# Patient Record
Sex: FEMALE | Race: WHITE | NOT HISPANIC OR LATINO | URBAN - METROPOLITAN AREA
[De-identification: names, ages, dates, MRNs, and addresses within clinical notes are randomized per-mention and may not be internally consistent; named-entity substitution may affect disease eponyms.]

---

## 2017-02-08 ENCOUNTER — APPOINTMENT (RX ONLY)
Dept: URBAN - METROPOLITAN AREA CLINIC 349 | Facility: CLINIC | Age: 72
Setting detail: DERMATOLOGY
End: 2017-02-08

## 2017-02-08 DIAGNOSIS — L82.0 INFLAMED SEBORRHEIC KERATOSIS: ICD-10-CM

## 2017-02-08 PROCEDURE — ? COUNSELING

## 2017-02-08 PROCEDURE — 17110 DESTRUCTION B9 LES UP TO 14: CPT

## 2017-02-08 PROCEDURE — ? LIQUID NITROGEN

## 2017-02-08 ASSESSMENT — LOCATION ZONE DERM: LOCATION ZONE: FACE

## 2017-02-08 ASSESSMENT — LOCATION DETAILED DESCRIPTION DERM: LOCATION DETAILED: RIGHT INFERIOR CENTRAL MALAR CHEEK

## 2017-02-08 ASSESSMENT — LOCATION SIMPLE DESCRIPTION DERM: LOCATION SIMPLE: RIGHT CHEEK

## 2017-02-08 NOTE — PROCEDURE: LIQUID NITROGEN
Consent: The patient's consent was obtained including but not limited to risks of crusting, scabbing, blistering, scarring, darker or lighter pigmentary change, recurrence, incomplete removal and infection.
Duration Of Freeze Thaw-Cycle (Seconds): 3
Include Z78.9 (Other Specified Conditions Influencing Health Status) As An Associated Diagnosis?: Yes
Medical Necessity Information: It is in your best interest to select a reason for this procedure from the list below. All of these items fulfill various CMS LCD requirements except the new and changing color options.
Detail Level: Detailed
Render Post-Care Instructions In Note?: no
Medical Necessity Clause: This procedure was medically necessary because the lesions that were treated were:
Number Of Freeze-Thaw Cycles: 1 freeze-thaw cycle
Post-Care Instructions: I reviewed with the patient in detail post-care instructions. Patient is to wear sunprotection, and avoid picking at any of the treated lesions. Pt may apply Vaseline to crusted or scabbing areas.

## 2017-08-14 ENCOUNTER — APPOINTMENT (RX ONLY)
Dept: URBAN - METROPOLITAN AREA CLINIC 349 | Facility: CLINIC | Age: 72
Setting detail: DERMATOLOGY
End: 2017-08-14

## 2017-08-14 DIAGNOSIS — L82.1 OTHER SEBORRHEIC KERATOSIS: ICD-10-CM

## 2017-08-14 DIAGNOSIS — D22 MELANOCYTIC NEVI: ICD-10-CM

## 2017-08-14 PROBLEM — D22.0 MELANOCYTIC NEVI OF LIP: Status: ACTIVE | Noted: 2017-08-14

## 2017-08-14 PROCEDURE — 88305 TISSUE EXAM BY PATHOLOGIST: CPT

## 2017-08-14 PROCEDURE — 11310 SHAVE SKIN LESION 0.5 CM/<: CPT

## 2017-08-14 PROCEDURE — ? COUNSELING

## 2017-08-14 PROCEDURE — 99213 OFFICE O/P EST LOW 20 MIN: CPT | Mod: 25

## 2017-08-14 PROCEDURE — ? PATHOLOGY BILLING

## 2017-08-14 PROCEDURE — A4550 SURGICAL TRAYS: HCPCS

## 2017-08-14 PROCEDURE — ? SHAVE REMOVAL

## 2017-08-14 ASSESSMENT — LOCATION DETAILED DESCRIPTION DERM
LOCATION DETAILED: LEFT INFERIOR UPPER BACK
LOCATION DETAILED: PERIUMBILICAL SKIN
LOCATION DETAILED: LEFT LATERAL SUPERIOR CHEST
LOCATION DETAILED: RIGHT LATERAL SUPERIOR CHEST
LOCATION DETAILED: RIGHT SUPERIOR VERMILION LIP
LOCATION DETAILED: LEFT SUPERIOR VERMILION LIP
LOCATION DETAILED: RIGHT SUPERIOR UPPER BACK
LOCATION DETAILED: RIGHT SUPERIOR LATERAL MIDBACK
LOCATION DETAILED: LEFT LATERAL UPPER BACK

## 2017-08-14 ASSESSMENT — LOCATION ZONE DERM
LOCATION ZONE: LIP
LOCATION ZONE: TRUNK
LOCATION ZONE: LIP

## 2017-08-14 ASSESSMENT — LOCATION SIMPLE DESCRIPTION DERM
LOCATION SIMPLE: LEFT LIP
LOCATION SIMPLE: ABDOMEN
LOCATION SIMPLE: CHEST
LOCATION SIMPLE: LEFT UPPER BACK
LOCATION SIMPLE: RIGHT UPPER BACK
LOCATION SIMPLE: RIGHT LOWER BACK
LOCATION SIMPLE: RIGHT LIP

## 2017-08-14 NOTE — PROCEDURE: SHAVE REMOVAL
Size Of Lesion In Cm (Required): 0.3
Notification Instructions: Patient will be notified of biopsy results. However, patient instructed to call the office if not contacted within 2 weeks.
Bill For Surgical Tray: yes
X Size Of Lesion In Cm (Optional): 0
Wound Care: Vaseline
Medical Necessity Information: It is in your best interest to select a reason for this procedure from the list below. All of these items fulfill various CMS LCD requirements except the new and changing color options.
Hemostasis: Electrocautery
Anesthesia Volume In Cc: 0.5
Size Of Margin In Cm (Margins Are Not Added To Billing Dimensions): -
Biopsy Method: Dermablade
Bill 38852 For Specimen Handling/Conveyance To Laboratory?: no
Anesthesia Type: 2% lidocaine with epinephrine
Path Notes (To The Dermatopathologist): Please check margins.
Consent was obtained from the patient. The risks and benefits to therapy were discussed in detail. Specifically, the risks of infection, scarring, bleeding, prolonged wound healing, incomplete removal, allergy to anesthesia, nerve injury and recurrence were addressed. Prior to the procedure, the treatment site was clearly identified and confirmed by the patient. All components of Universal Protocol/PAUSE Rule completed.
Medical Necessity Clause: This procedure was medically necessary because the lesion that was treated was: growing, patient has had a renal transplant
Accession #: [id number]
Post-Care Instructions: I reviewed with the patient in detail post-care instructions. Patient is to keep the biopsy site dry overnight, and then apply vaseline twice daily until healed. Patient may apply hydrogen peroxide soaks to remove any crusting. After the procedure, the patient was observed for 5-10 minutes and was oriented to person, place and time and demied feeling dizzy, queasy, and stated that they did not feel that they were going to faint.
Detail Level: Detailed
Billing Type: Third-Party Bill

## 2017-08-14 NOTE — HPI: SKIN LESION
How Severe Is Your Skin Lesion?: moderate
Has Your Skin Lesion Been Treated?: been treated
Is This A New Presentation, Or A Follow-Up?: Skin Lesion
Which Family Member (Optional)?: Brother

## 2017-08-28 ENCOUNTER — APPOINTMENT (RX ONLY)
Dept: URBAN - METROPOLITAN AREA CLINIC 349 | Facility: CLINIC | Age: 72
Setting detail: DERMATOLOGY
End: 2017-08-28

## 2017-08-28 DIAGNOSIS — Z41.9 ENCOUNTER FOR PROCEDURE FOR PURPOSES OTHER THAN REMEDYING HEALTH STATE, UNSPECIFIED: ICD-10-CM

## 2017-08-28 PROCEDURE — ? FILLERS

## 2018-01-04 ENCOUNTER — APPOINTMENT (RX ONLY)
Dept: URBAN - METROPOLITAN AREA CLINIC 349 | Facility: CLINIC | Age: 73
Setting detail: DERMATOLOGY
End: 2018-01-04

## 2018-01-04 DIAGNOSIS — L82.1 OTHER SEBORRHEIC KERATOSIS: ICD-10-CM

## 2018-01-04 DIAGNOSIS — L82.0 INFLAMED SEBORRHEIC KERATOSIS: ICD-10-CM

## 2018-01-04 PROBLEM — K21.9 GASTRO-ESOPHAGEAL REFLUX DISEASE WITHOUT ESOPHAGITIS: Status: ACTIVE | Noted: 2018-01-04

## 2018-01-04 PROCEDURE — ? LIQUID NITROGEN

## 2018-01-04 PROCEDURE — ? COUNSELING

## 2018-01-04 PROCEDURE — 17110 DESTRUCTION B9 LES UP TO 14: CPT

## 2018-01-04 PROCEDURE — 99213 OFFICE O/P EST LOW 20 MIN: CPT | Mod: 25

## 2018-01-04 PROCEDURE — ? PHOTO-DOCUMENTATION

## 2018-01-04 ASSESSMENT — LOCATION SIMPLE DESCRIPTION DERM
LOCATION SIMPLE: CHEST
LOCATION SIMPLE: RIGHT SHOULDER
LOCATION SIMPLE: LEFT UPPER BACK
LOCATION SIMPLE: RIGHT UPPER BACK

## 2018-01-04 ASSESSMENT — LOCATION DETAILED DESCRIPTION DERM
LOCATION DETAILED: LEFT SUPERIOR UPPER BACK
LOCATION DETAILED: RIGHT LATERAL UPPER BACK
LOCATION DETAILED: RIGHT LATERAL SUPERIOR CHEST
LOCATION DETAILED: RIGHT POSTERIOR SHOULDER

## 2018-01-04 ASSESSMENT — LOCATION ZONE DERM
LOCATION ZONE: ARM
LOCATION ZONE: TRUNK

## 2018-01-04 NOTE — PROCEDURE: LIQUID NITROGEN
Medical Necessity Clause: This procedure was medically necessary because the lesions that were treated were:
Consent: The patient's consent was obtained including but not limited to risks of crusting, scabbing, blistering, scarring, darker or lighter pigmentary change, recurrence, incomplete removal and infection.
Duration Of Freeze Thaw-Cycle (Seconds): 5-10
Detail Level: Detailed
Add 52 Modifier (Optional): no
Include Z78.9 (Other Specified Conditions Influencing Health Status) As An Associated Diagnosis?: Yes
Post-Care Instructions: I reviewed with the patient in detail post-care instructions. Patient is to wear sunprotection, and avoid picking at any of the treated lesions. Pt may apply Vaseline to crusted or scabbing areas.
Medical Necessity Information: It is in your best interest to select a reason for this procedure from the list below. All of these items fulfill various CMS LCD requirements except the new and changing color options.

## 2018-08-13 ENCOUNTER — APPOINTMENT (RX ONLY)
Dept: URBAN - METROPOLITAN AREA CLINIC 349 | Facility: CLINIC | Age: 73
Setting detail: DERMATOLOGY
End: 2018-08-13

## 2018-08-13 DIAGNOSIS — Z71.89 OTHER SPECIFIED COUNSELING: ICD-10-CM

## 2018-08-13 DIAGNOSIS — L73.8 OTHER SPECIFIED FOLLICULAR DISORDERS: ICD-10-CM

## 2018-08-13 DIAGNOSIS — Z80.8 FAMILY HISTORY OF MALIGNANT NEOPLASM OF OTHER ORGANS OR SYSTEMS: ICD-10-CM

## 2018-08-13 DIAGNOSIS — D22 MELANOCYTIC NEVI: ICD-10-CM

## 2018-08-13 DIAGNOSIS — L82.1 OTHER SEBORRHEIC KERATOSIS: ICD-10-CM

## 2018-08-13 PROBLEM — D22.5 MELANOCYTIC NEVI OF TRUNK: Status: ACTIVE | Noted: 2018-08-13

## 2018-08-13 PROBLEM — K21.9 GASTRO-ESOPHAGEAL REFLUX DISEASE WITHOUT ESOPHAGITIS: Status: ACTIVE | Noted: 2018-08-13

## 2018-08-13 PROCEDURE — ? COUNSELING

## 2018-08-13 PROCEDURE — 99213 OFFICE O/P EST LOW 20 MIN: CPT

## 2018-08-13 PROCEDURE — ? DEFER

## 2018-08-13 ASSESSMENT — LOCATION ZONE DERM
LOCATION ZONE: TRUNK
LOCATION ZONE: LIP
LOCATION ZONE: LEG
LOCATION ZONE: LIP
LOCATION ZONE: ARM

## 2018-08-13 ASSESSMENT — LOCATION SIMPLE DESCRIPTION DERM
LOCATION SIMPLE: RIGHT CALF
LOCATION SIMPLE: LEFT PRETIBIAL REGION
LOCATION SIMPLE: RIGHT LIP
LOCATION SIMPLE: RIGHT FOREARM
LOCATION SIMPLE: RIGHT THIGH
LOCATION SIMPLE: LEFT FOREARM
LOCATION SIMPLE: CHEST
LOCATION SIMPLE: LEFT UPPER BACK
LOCATION SIMPLE: RIGHT LIP
LOCATION SIMPLE: LEFT THIGH
LOCATION SIMPLE: RIGHT UPPER LIP
LOCATION SIMPLE: RIGHT LOWER BACK
LOCATION SIMPLE: ABDOMEN
LOCATION SIMPLE: RIGHT PRETIBIAL REGION
LOCATION SIMPLE: LEFT CALF

## 2018-08-13 ASSESSMENT — LOCATION DETAILED DESCRIPTION DERM
LOCATION DETAILED: RIGHT SUPERIOR VERMILION LIP
LOCATION DETAILED: RIGHT SUPERIOR MEDIAL MIDBACK
LOCATION DETAILED: RIGHT ANTERIOR PROXIMAL THIGH
LOCATION DETAILED: RIGHT RIB CAGE
LOCATION DETAILED: LEFT PROXIMAL DORSAL FOREARM
LOCATION DETAILED: PERIUMBILICAL SKIN
LOCATION DETAILED: LEFT PROXIMAL CALF
LOCATION DETAILED: LEFT SUPERIOR MEDIAL UPPER BACK
LOCATION DETAILED: RIGHT SUPERIOR VERMILION BORDER
LOCATION DETAILED: RIGHT PROXIMAL PRETIBIAL REGION
LOCATION DETAILED: RIGHT PROXIMAL CALF
LOCATION DETAILED: LEFT ANTERIOR DISTAL THIGH
LOCATION DETAILED: LEFT PROXIMAL PRETIBIAL REGION
LOCATION DETAILED: RIGHT SUPERIOR VERMILION LIP
LOCATION DETAILED: MIDDLE STERNUM
LOCATION DETAILED: RIGHT DISTAL RADIAL DORSAL FOREARM

## 2018-10-02 ENCOUNTER — APPOINTMENT (RX ONLY)
Dept: URBAN - METROPOLITAN AREA CLINIC 349 | Facility: CLINIC | Age: 73
Setting detail: DERMATOLOGY
End: 2018-10-02

## 2018-10-02 DIAGNOSIS — L82.0 INFLAMED SEBORRHEIC KERATOSIS: ICD-10-CM

## 2018-10-02 PROCEDURE — ? BENIGN DESTRUCTION

## 2018-10-02 PROCEDURE — ? COUNSELING

## 2018-10-02 PROCEDURE — 17110 DESTRUCTION B9 LES UP TO 14: CPT

## 2018-10-02 ASSESSMENT — LOCATION SIMPLE DESCRIPTION DERM
LOCATION SIMPLE: LEFT UPPER LIP
LOCATION SIMPLE: LEFT LIP

## 2018-10-02 ASSESSMENT — LOCATION DETAILED DESCRIPTION DERM
LOCATION DETAILED: LEFT SUPERIOR VERMILION LIP
LOCATION DETAILED: LEFT SUPERIOR VERMILION BORDER

## 2018-10-02 ASSESSMENT — LOCATION ZONE DERM: LOCATION ZONE: LIP

## 2018-10-02 NOTE — PROCEDURE: BENIGN DESTRUCTION
Include Z78.9 (Other Specified Conditions Influencing Health Status) As An Associated Diagnosis?: Yes
Add 52 Modifier (Optional): no
Anesthesia Volume In Cc: 0
Post-Care Instructions: I reviewed with the patient in detail post-care instructions. Patient is to wear sunprotection, and avoid picking at any of the treated lesions. Pt may apply Vaseline to crusted or scabbing areas.
Consent: The patient's consent was obtained including but not limited to risks of crusting, scabbing, blistering, scarring, darker or lighter pigmentary change, recurrence, incomplete removal and infection.
Detail Level: Zone
Medical Necessity Information: It is in your best interest to select a reason for this procedure from the list below. All of these items fulfill various CMS LCD requirements except the new and changing color options.
Medical Necessity Clause: This procedure was medically necessary because the lesions that were treated were:

## 2019-07-15 ENCOUNTER — HOSPITAL ENCOUNTER (OUTPATIENT)
Dept: PHYSICAL THERAPY | Age: 74
Discharge: HOME | End: 2019-07-15
Payer: MEDICARE

## 2019-07-15 ENCOUNTER — HOSPITAL ENCOUNTER (OUTPATIENT)
Dept: SURGERY | Age: 74
Discharge: HOME | End: 2019-07-15
Payer: MEDICARE

## 2019-07-15 LAB
ANION GAP SERPL CALC-SCNC: 9 MMOL/L (ref 7–16)
APPEARANCE UR: CLEAR
APTT PPP: 28.4 SEC (ref 24.7–39.8)
ATRIAL RATE: 55 BPM
BACTERIA SPEC CULT: ABNORMAL
BACTERIA URNS QL MICRO: 0 /HPF
BASOPHILS # BLD: 0.1 K/UL (ref 0–0.2)
BASOPHILS NFR BLD: 1 % (ref 0–2)
BILIRUB UR QL: NEGATIVE
BUN SERPL-MCNC: 11 MG/DL (ref 8–23)
CALCIUM SERPL-MCNC: 9.9 MG/DL (ref 8.3–10.4)
CALCULATED P AXIS, ECG09: 75 DEGREES
CALCULATED R AXIS, ECG10: 63 DEGREES
CALCULATED T AXIS, ECG11: 79 DEGREES
CASTS URNS QL MICRO: ABNORMAL /LPF
CHLORIDE SERPL-SCNC: 98 MMOL/L (ref 98–107)
CO2 SERPL-SCNC: 32 MMOL/L (ref 21–32)
COLOR UR: YELLOW
CREAT SERPL-MCNC: 0.6 MG/DL (ref 0.6–1)
DIAGNOSIS, 93000: NORMAL
DIFFERENTIAL METHOD BLD: ABNORMAL
EOSINOPHIL # BLD: 0.1 K/UL (ref 0–0.8)
EOSINOPHIL NFR BLD: 1 % (ref 0.5–7.8)
EPI CELLS #/AREA URNS HPF: ABNORMAL /HPF
ERYTHROCYTE [DISTWIDTH] IN BLOOD BY AUTOMATED COUNT: 12.1 % (ref 11.9–14.6)
GLUCOSE SERPL-MCNC: 94 MG/DL (ref 65–100)
GLUCOSE UR STRIP.AUTO-MCNC: NEGATIVE MG/DL
HCT VFR BLD AUTO: 39.4 % (ref 35.8–46.3)
HGB BLD-MCNC: 13.4 G/DL (ref 11.7–15.4)
HGB UR QL STRIP: NEGATIVE
IMM GRANULOCYTES # BLD AUTO: 0 K/UL (ref 0–0.5)
IMM GRANULOCYTES NFR BLD AUTO: 0 % (ref 0–5)
INR PPP: 1
KETONES UR QL STRIP.AUTO: NEGATIVE MG/DL
LEUKOCYTE ESTERASE UR QL STRIP.AUTO: ABNORMAL
LYMPHOCYTES # BLD: 2 K/UL (ref 0.5–4.6)
LYMPHOCYTES NFR BLD: 23 % (ref 13–44)
MCH RBC QN AUTO: 33.3 PG (ref 26.1–32.9)
MCHC RBC AUTO-ENTMCNC: 34 G/DL (ref 31.4–35)
MCV RBC AUTO: 98 FL (ref 79.6–97.8)
MONOCYTES # BLD: 0.7 K/UL (ref 0.1–1.3)
MONOCYTES NFR BLD: 8 % (ref 4–12)
NEUTS SEG # BLD: 5.9 K/UL (ref 1.7–8.2)
NEUTS SEG NFR BLD: 67 % (ref 43–78)
NITRITE UR QL STRIP.AUTO: NEGATIVE
NRBC # BLD: 0 K/UL (ref 0–0.2)
P-R INTERVAL, ECG05: 166 MS
PH UR STRIP: 7 [PH] (ref 5–9)
PLATELET # BLD AUTO: 281 K/UL (ref 150–450)
PMV BLD AUTO: 10.7 FL (ref 9.4–12.3)
POTASSIUM SERPL-SCNC: 3.7 MMOL/L (ref 3.5–5.1)
PROT UR STRIP-MCNC: NEGATIVE MG/DL
PROTHROMBIN TIME: 12.8 SEC (ref 11.7–14.5)
Q-T INTERVAL, ECG07: 418 MS
QRS DURATION, ECG06: 76 MS
QTC CALCULATION (BEZET), ECG08: 399 MS
RBC # BLD AUTO: 4.02 M/UL (ref 4.05–5.2)
RBC #/AREA URNS HPF: ABNORMAL /HPF
SERVICE CMNT-IMP: ABNORMAL
SODIUM SERPL-SCNC: 139 MMOL/L (ref 136–145)
SP GR UR REFRACTOMETRY: 1.01 (ref 1–1.02)
UROBILINOGEN UR QL STRIP.AUTO: 0.2 EU/DL (ref 0.2–1)
VENTRICULAR RATE, ECG03: 55 BPM
WBC # BLD AUTO: 8.8 K/UL (ref 4.3–11.1)
WBC URNS QL MICRO: ABNORMAL /HPF

## 2019-07-15 PROCEDURE — 85730 THROMBOPLASTIN TIME PARTIAL: CPT

## 2019-07-15 PROCEDURE — 85025 COMPLETE CBC W/AUTO DIFF WBC: CPT

## 2019-07-15 PROCEDURE — 80048 BASIC METABOLIC PNL TOTAL CA: CPT

## 2019-07-15 PROCEDURE — 97161 PT EVAL LOW COMPLEX 20 MIN: CPT

## 2019-07-15 PROCEDURE — 36415 COLL VENOUS BLD VENIPUNCTURE: CPT

## 2019-07-15 PROCEDURE — 81001 URINALYSIS AUTO W/SCOPE: CPT

## 2019-07-15 PROCEDURE — 93005 ELECTROCARDIOGRAM TRACING: CPT | Performed by: PHYSICIAN ASSISTANT

## 2019-07-15 PROCEDURE — 87641 MR-STAPH DNA AMP PROBE: CPT

## 2019-07-15 PROCEDURE — 77030027138 HC INCENT SPIROMETER -A

## 2019-07-15 PROCEDURE — 85610 PROTHROMBIN TIME: CPT

## 2019-07-15 RX ORDER — ESOMEPRAZOLE MAGNESIUM 40 MG/1
40 CAPSULE, DELAYED RELEASE ORAL
COMMUNITY
End: 2020-09-03 | Stop reason: SDUPTHER

## 2019-07-15 RX ORDER — VITAMIN E 1000 UNIT
1000 CAPSULE ORAL DAILY
COMMUNITY
End: 2019-08-02

## 2019-07-15 RX ORDER — CHOLECALCIFEROL (VITAMIN D3) 125 MCG
1 CAPSULE ORAL DAILY
COMMUNITY
End: 2019-08-02

## 2019-07-15 RX ORDER — LANOLIN ALCOHOL/MO/W.PET/CERES
500 CREAM (GRAM) TOPICAL
COMMUNITY
End: 2020-04-29

## 2019-07-15 RX ORDER — BISMUTH SUBSALICYLATE 262 MG
1 TABLET,CHEWABLE ORAL DAILY
COMMUNITY
End: 2019-08-02

## 2019-07-15 NOTE — PERIOP NOTES
Lab results sent to PCP per surgeon's request.     Recent Results (from the past 12 hour(s))   CBC WITH AUTOMATED DIFF    Collection Time: 07/15/19  8:45 AM   Result Value Ref Range    WBC 8.8 4.3 - 11.1 K/uL    RBC 4.02 (L) 4.05 - 5.2 M/uL    HGB 13.4 11.7 - 15.4 g/dL    HCT 39.4 35.8 - 46.3 %    MCV 98.0 (H) 79.6 - 97.8 FL    MCH 33.3 (H) 26.1 - 32.9 PG    MCHC 34.0 31.4 - 35.0 g/dL    RDW 12.1 11.9 - 14.6 %    PLATELET 112 581 - 939 K/uL    MPV 10.7 9.4 - 12.3 FL    ABSOLUTE NRBC 0.00 0.0 - 0.2 K/uL    DF AUTOMATED      NEUTROPHILS 67 43 - 78 %    LYMPHOCYTES 23 13 - 44 %    MONOCYTES 8 4.0 - 12.0 %    EOSINOPHILS 1 0.5 - 7.8 %    BASOPHILS 1 0.0 - 2.0 %    IMMATURE GRANULOCYTES 0 0.0 - 5.0 %    ABS. NEUTROPHILS 5.9 1.7 - 8.2 K/UL    ABS. LYMPHOCYTES 2.0 0.5 - 4.6 K/UL    ABS. MONOCYTES 0.7 0.1 - 1.3 K/UL    ABS. EOSINOPHILS 0.1 0.0 - 0.8 K/UL    ABS. BASOPHILS 0.1 0.0 - 0.2 K/UL    ABS. IMM.  GRANS. 0.0 0.0 - 0.5 K/UL   METABOLIC PANEL, BASIC    Collection Time: 07/15/19  8:45 AM   Result Value Ref Range    Sodium 139 136 - 145 mmol/L    Potassium 3.7 3.5 - 5.1 mmol/L    Chloride 98 98 - 107 mmol/L    CO2 32 21 - 32 mmol/L    Anion gap 9 7 - 16 mmol/L    Glucose 94 65 - 100 mg/dL    BUN 11 8 - 23 MG/DL    Creatinine 0.60 0.6 - 1.0 MG/DL    GFR est AA >60 >60 ml/min/1.73m2    GFR est non-AA >60 >60 ml/min/1.73m2    Calcium 9.9 8.3 - 10.4 MG/DL   PROTHROMBIN TIME + INR    Collection Time: 07/15/19  8:45 AM   Result Value Ref Range    Prothrombin time 12.8 11.7 - 14.5 sec    INR 1.0     PTT    Collection Time: 07/15/19  8:45 AM   Result Value Ref Range    aPTT 28.4 24.7 - 39.8 SEC   URINALYSIS W/ RFLX MICROSCOPIC    Collection Time: 07/15/19  8:50 AM   Result Value Ref Range    Color YELLOW      Appearance CLEAR      Specific gravity 1.009 1.001 - 1.023      pH (UA) 7.0 5.0 - 9.0      Protein NEGATIVE  NEG mg/dL    Glucose NEGATIVE  mg/dL    Ketone NEGATIVE  NEG mg/dL    Bilirubin NEGATIVE  NEG      Blood NEGATIVE NEG      Urobilinogen 0.2 0.2 - 1.0 EU/dL    Nitrites NEGATIVE  NEG      Leukocyte Esterase TRACE (A) NEG      WBC 5-10 0 /hpf    RBC 0-3 0 /hpf    Epithelial cells 0-3 0 /hpf    Bacteria 0 0 /hpf    Casts 0-3 0 /lpf   EKG, 12 LEAD, INITIAL    Collection Time: 07/15/19 10:04 AM   Result Value Ref Range    Ventricular Rate 55 BPM    Atrial Rate 55 BPM    P-R Interval 166 ms    QRS Duration 76 ms    Q-T Interval 418 ms    QTC Calculation (Bezet) 399 ms    Calculated P Axis 75 degrees    Calculated R Axis 63 degrees    Calculated T Axis 79 degrees    Diagnosis       Sinus bradycardia  Septal infarct , age undetermined  Abnormal ECG  No previous ECGs available  Confirmed by TALITA JOHNSTON (), Dahiana Coles ([id number]) on 7/15/2019 12:57:06 PM     MSSA/MRSA SC BY PCR, NASAL SWAB    Collection Time: 07/15/19 11:00 AM   Result Value Ref Range    Special Requests: NASAL      Culture result: (A)       MRSA target DNA not detected, SA target DNA detected. A MRSA negative, SA positive test result does not preclude MRSA nasal colonization.

## 2019-07-15 NOTE — PROGRESS NOTES
Sherrell Lawrence  : (89 y.o.) [address] at [address], [address] [place] [address]  [phone]       Physical Therapy Prehab Plan of Treatment and Evaluation Summary:7/15/2019    ICD-10: Treatment Diagnosis:   · Pain in Right Hip (M25.551)  · Stiffness of Right Hip, Not elsewhere classified (M25.651)  · Difficulty in walking, Not elsewhere classified (R26.2)  · Other abnormalities of gait and mobility (R26.89)  Precautions/Allergies:   Patient has no known allergies. MEDICAL/REFERRING DIAGNOSIS:  Unilateral primary osteoarthritis, right hip [M16.11]  REFERRING PHYSICIAN: Marilee Osullivan,*  DATE OF SURGERY: 19    Assessment:   Comments:  Pain in right hip joint with decreased independence with functional mobility. Pt plans to go to rehab for a few days following hospital stay. Pt lives alone. Pt notified might need longer rehab length of stay. Pt motivated and prepared for surgery. PROBLEM LIST (Impacting functional limitations):  Ms. Shilpa Harrell presents with the following right lower extremity(s) problems:  1. Transfers  2. Gait  3. Strength  4. Range of Motion  5. Pain   INTERVENTIONS PLANNED:  1. Home Exercise Program  2. Educational Discussion      TREATMENT PLAN: Effective Dates: 7/15/2019 TO 7/15/2019. Frequency/Duration: Patient to continue to perform home exercise program at least twice per day up until her surgery. GOALS: (Goals have been discussed and agreed upon with patient.)  Discharge Goals: Time Frame: 1 Day  1. Patient will demonstrate independence with a home exercise program designed to increase strength, range of motion and pain control to minimize functional deficits and optimize patient for total joint replacement. Rehabilitation Potential For Stated Goals: Good  Regarding Hanna Nails's therapy, I certify that the treatment plan above will be carried out by a therapist or under their direction.   Thank you for this referral,  Dhara Daniel LELE Johnson, PT               HISTORY:   Present Symptoms:  Pain Intensity 1: 4  Pain Location 1: Hip  Pain Orientation 1: Right   History of Present Injury/Illness (Reason for Referral):  Medical/Referring Diagnosis: Unilateral primary osteoarthritis, right hip [M16.11]   Past Medical History/Comorbidities:   Ms. Solomon  has a past medical history of Aortic atherosclerosis ([facility] [address] Arthritis, Ascending aortic aneurysm ([facility] [address] Chronic airway obstruction, not elsewhere classified, GERD (gastroesophageal reflux disease), Hiatal hernia, Hyperlipemia, Hypertension, Osteopenia, and Vitamin D deficiency. Ms. Solomon  has a past surgical history that includes hx gyn (Bilateral); hx gyn; hx carpal tunnel release (Bilateral, 2004, 2005); hx tonsillectomy; and hx cataract removal (07/10/2019). Social History/Living Environment:   Home Environment: Private residence  # Steps to Enter: 2  One/Two Story Residence: Two story, live on 1st floor  # of Interior Steps: 14  Living Alone: Yes  Patient Expects to be Discharged to[de-identified] Rehabilitation facility  Current DME Used/Available at Home: Shower chair, Commode, bedside, Walker, rolling  Tub or Shower Type: Tub/Shower combination  Work/Activity:  Employment requires sitting.   Dominant Side:  RIGHT  Current Medications:  See Pre-assessment nursing note   Number of Personal Factors/Comorbidities that affect the Plan of Care: 0: LOW COMPLEXITY   EXAMINATION:   ADLs (Current Functional Status):   Ambulation:  [x] Independent  [] Walk Indoors Only  [] Walk Outdoors  [] Use Assistive Device  [] Use Wheelchair Only Dressing:  [x] Independent  Requires Assistance from Someone for:  [] Sock/Shoes  [] Pants  [] Everything   Bathing/Showering:   [x] Independent  [] Requires Assistance from Someone  [] Sponge Bath Only Household Activities:  [] Routine house and yard work  [x] Light Housework Only  [] None   Observation/Orthostatic Postural Assessment:   Within defined limits   ROM/Flexibility: Gross Assessment: Yes  AROM: Generally decreased, functional                LLE Assessment  LLE Assessment (WDL): Within defined limits      RLE Assessment  RLE Assessment (WDL): Within defined limits   Strength:   Gross Assessment: Yes  Strength: Generally decreased, functional                  Functional Mobility:    Gross Assessment: Yes  Coordination: Generally decreased, functional    Gait Description (WDL): Within defined limits  Stand to Sit: Independent  Sit to Stand: Independent  Distance (ft): 1000 Feet (ft)  Ambulation - Level of Assistance: Independent  Gait Abnormalities: Antalgic          Balance:    Sitting: Intact  Standing: Intact   Body Structures Involved:  1. Bones  2. Joints  3. Muscles  4. Ligaments Body Functions Affected:  1. Neuromusculoskeletal  2. Movement Related Activities and Participation Affected:  1. Mobility   Number of elements that affect the Plan of Care: 4+: HIGH COMPLEXITY   CLINICAL PRESENTATION:   Presentation: Stable and uncomplicated: LOW COMPLEXITY   CLINICAL DECISION MAKING:   Outcome Measure: Tool Used: Lower Extremity Functional Scale (LEFS)  Score:  Initial: 35/80 Most Recent: X/80 (Date: -- )   Interpretation of Score: 20 questions each scored on a 5 point scale with 0 representing \"extreme difficulty or unable to perform\" and 4 representing \"no difficulty\". The lower the score, the greater the functional disability. 80/80 represents no disability. Minimal detectable change is 9 points. Medical Necessity:   · Ms. Jesus Abreu is expected to optimize her lower extremity strength and ROM in preparation for joint replacement surgery. Reason for Services/Other Comments:  · Achieve baseline assesment of musculoskeletal system, functional mobility and home environment. , educate in PT HEP in preparation for surgery, educate in hospital plan of care.    Use of outcome tool(s) and clinical judgement create a POC that gives a: Clear prediction of patient's progress: LOW COMPLEXITY   TREATMENT:   Treatment/Session Assessment:  Patient was instructed in PT- HEP to increase strength and ROM in LEs. Answered all questions. · Post session pain:  4  · Compliance with Program/Exercises: compliant most of the time.   Total Treatment Duration:  PT Patient Time In/Time Out  Time In: 0900  Time Out: 0930    Anuradha Manriquez PT

## 2019-07-15 NOTE — PERIOP NOTES
Patient verified name and . Order for consent found in EHR and matches case posting; patient verified. Right total hip arthroplasty    Type 3 surgery, PAT Joint assessment complete. Labs per surgeon: cbc, bmp, UA, PT, PTT, MRSA nasal swab; results within anesthesia guidelines. Labs per anesthesia protocol: no additional lab work needed. EKG:Done today- abnormal; will have anesthesia review. Pt's B/P elevated today. Pt states B/P has never been this high and it was normal at last PCP office visit one month ago. Arya Wolf NP notified. No new orders received. Last EKG and last PCP office note requested to be faxed to [phone] from [facility] [place] in primary care. Pt is unsure where last EKG was done or when it was done. MRSA/MSSA swab collected; pharmacy to review and dose antibiotic as appropriate. Molly-hex and instructions to return bottle on DOS given per hospital policy. Patient provided with handouts including Guide to Surgery, Pain Management, Hand Hygiene, Blood Transfusion Education, and [facility] Brochure. Patient answered medical/surgical history questions at their best of ability. All prior to admission medications documented in [facility]. Original medication prescription bottle not visualized during patient appointment. Patient instructed to hold all vitamins/supplements 7 days prior to surgery and NSAIDS 5 days prior to surgery. Patient instructed to continue previous medications as prescribed prior to surgery and to take the following medications the day of surgery according to anesthesia guidelines with a small sip of water: nexium if needed. Patient teach back successful and patient demonstrates knowledge of instruction.

## 2019-07-15 NOTE — PERIOP NOTES
Last office note dated 5/29/19 received from [facility] in primary care. They do not have an EKG on file for pt. Will have charge nurse request records (Echo) from Dr. Contreras Colvin in the AM due to office currently being closed.

## 2019-07-16 VITALS
DIASTOLIC BLOOD PRESSURE: 78 MMHG | RESPIRATION RATE: 16 BRPM | TEMPERATURE: 95.7 F | WEIGHT: 137.2 LBS | BODY MASS INDEX: 24.31 KG/M2 | OXYGEN SATURATION: 99 % | SYSTOLIC BLOOD PRESSURE: 185 MMHG | HEIGHT: 63 IN | HEART RATE: 82 BPM

## 2019-07-16 NOTE — PERIOP NOTES
Dr. Ghislaine Ramirez, anesthesia, reviewed chart including ekg (7/15/19), Dr. Kelle Hernandez note (1/30/18), CTA (12/21/17) - aware no old EKG for comparison. OK to proceed.

## 2019-07-16 NOTE — PROGRESS NOTES
07/15/19 0900   Oxygen Therapy   O2 Sat (%) 98 %   Pulse via Oximetry 59 beats per minute   O2 Device Room air   Pre-Treatment   Breath Sounds Bilateral Clear   Pre FEV1 (liters) 1.4 liters   % Predicted 70   Incentive Spirometry Treatment   Actual Volume (ml) 1500 ml     Initial respiratory Assessment completed with pt. Pt was interviewed and evaluated in Joint camp prior to surgery. Patient ID:  Mildred Castañeda  [id number]  14 y.o.  1945  Surgeon: Dr. Dung Francis  Date of Surgery: 2019  Procedure: Total Right Hip Arthroplasty  Primary Care Physician: Faustino Rose -[phone]  Specialists:                                  Pt instructed in the use of Incentive Spirometry. Pt instructed to bring Incentive Spirometer back on date of surgery & to start using Is upon return to pt room.     Pt taught proper cough technique    History of smokin/2 PPD FOR 30 YEARS                                                      Quit date:      6 YEARS AGO    Secondhand smoke:FATHER      Past procedures with Oxygen desaturation:DENIES    Past Medical History:   Diagnosis Date    Aortic atherosclerosis ([facility] [address]     Dr. Brennen Holly following     Arthritis     Ascending aortic aneurysm ([facility])     4.2 cm, Followed by Dr. Brennen Holly- per office note dated 18- no change in size; follow up in 3 yrs     Chronic airway obstruction, not elsewhere classified     COPD diagnosed by PCP, pt states no symptoms- if anything very mild     GERD (gastroesophageal reflux disease)     Managed with meds     Hiatal hernia     Hyperlipemia     Pt denies     Hypertension     Managed with meds     Osteopenia     Vitamin D deficiency     10 + yrs ago per pt           HX OF COLLAPSED LUNG IN PT HISTORY, PT DENIE  COPD                                                                                                                                           Respiratory history:DENIES SOB Respiratory meds:  DENIES                                       FAMILY PRESENT:                                                         NO                                        PAST SLEEP STUDY:                      DENIES  HX OF BLUE:                                  DENIES                                     BLUE assessment:                                               SLEEPS ON SIDE         &    BACK                                                                 PHYSICAL EXAM   Body mass index is 24.3 kg/m².    Visit Vitals  /78 (BP 1 Location: Right arm, BP Patient Position: At rest;Sitting)   Pulse 82   Temp 95.7 °F (35.4 °C)   Resp 16   Ht 5' 3\" (1.6 m)   Wt 62.2 kg (137 lb 3.2 oz)   SpO2 99%   BMI 24.30 kg/m²     Neck circumference:  32    cm    Loud snoring:                                    DENIES    Witnessed apnea or wakening gasping or choking:,             DENIES,                                                                                                   Awakens with headaches:                                                  DENIES    Morning or daytime tiredness/ sleepiness:                    DENIES                                                                                          Dry mouth or sore throat in morning:                                                                                     DENIES    Lam stage:  4    SACS score:1      Stop Bang   STOP-BANG  Does the patient snore loudly (louder than talking or loud enough to be heard through closed doors)?: No  Does the patient often feel tired, fatigued, or sleepy during the daytime, even after a \"good\" night's sleep?: No  Has anyone ever observed the patient stop breathing during their sleep? : No  Does the patient have or are they being treated for high blood pressure?: Yes  Is the patient's BMI greater than 35?: No  Is your neck circumference greater than 17 inches (Male) or 16 inches (Female)?: No  Is the patient older than 50?: Yes  Is the patient male?: No  BLUE Score: 2                            CPAP:                       NONE                                        ALBUTEROL NEB Q6 PRN                        Referrals:    Pt. Phone Number:

## 2019-07-26 NOTE — H&P
[id number] [facility]  History and Physical Exam    Patient ID:  Dalton Page  [id number]    8 y.o.  1945    Today: July 26, 2019    Vitals Signs: Reviewed as noted in medical record. Allergies: No Known Allergies    CC: Right hip pain    HPI:  Pt complains of right hip pain with difficulty ambulating. Relevant PMH:   Past Medical History:   Diagnosis Date    Aortic atherosclerosis ([facility] [address]     Dr. Matt Varma following     Arthritis     Ascending aortic aneurysm ([facility] [address]     4.2 cm, Followed by Dr. Matt Varma- per office note dated 1/30/18- no change in size; follow up in 3 yrs     Chronic airway obstruction, not elsewhere classified     COPD diagnosed by PCP, pt states no symptoms- if anything very mild     GERD (gastroesophageal reflux disease)     Managed with meds     Hiatal hernia     Hyperlipemia     Pt denies     Hypertension     Managed with meds     Osteopenia     Vitamin D deficiency     10 + yrs ago per pt       Objective:                    HEENT: NC/AT                   Lungs:  clear                   Heart:   rrr                   Abdomen: soft                   Extremities:  Pain with rom of the right hip joint    Radiographs: reveal osteoarthritis with loss of joint space and bone spurs. Assessment: Unilateral primary osteoarthritis, right hip [M16.11]    Plan:  Proceed with scheduled Procedure(s) (LRB):  RIGHT TOTAL HIP ARTHOPLASTY DEPUY (Right) . The patient has failed conservative treatment including NSAIDS, and injections. Due to the amount of pain the patient is experiencing we will proceed with scheduled procedure.       Signed By: LILLIANA Hamilton  July 26, 2019

## 2019-07-31 ENCOUNTER — ANESTHESIA EVENT (OUTPATIENT)
Dept: SURGERY | Age: 74
DRG: 470 | End: 2019-07-31
Payer: MEDICARE

## 2019-08-01 ENCOUNTER — APPOINTMENT (OUTPATIENT)
Dept: GENERAL RADIOLOGY | Age: 74
DRG: 470 | End: 2019-08-01
Attending: PHYSICIAN ASSISTANT
Payer: MEDICARE

## 2019-08-01 ENCOUNTER — ANESTHESIA (OUTPATIENT)
Dept: SURGERY | Age: 74
DRG: 470 | End: 2019-08-01
Payer: MEDICARE

## 2019-08-01 ENCOUNTER — HOSPITAL ENCOUNTER (INPATIENT)
Age: 74
LOS: 1 days | Discharge: HOME HEALTH | DRG: 470 | End: 2019-08-02
Attending: ORTHOPAEDIC SURGERY | Admitting: ORTHOPAEDIC SURGERY
Payer: MEDICARE

## 2019-08-01 ENCOUNTER — HOME HEALTH ADMISSION (OUTPATIENT)
Dept: HOME HEALTH SERVICES | Facility: HOME HEALTH | Age: 74
End: 2019-08-01
Payer: MEDICARE

## 2019-08-01 DIAGNOSIS — M16.11 ARTHRITIS OF RIGHT HIP: Primary | ICD-10-CM

## 2019-08-01 LAB
ABO + RH BLD: NORMAL
BLOOD GROUP ANTIBODIES SERPL: NORMAL
GLUCOSE BLD STRIP.AUTO-MCNC: 102 MG/DL (ref 65–100)
HGB BLD-MCNC: 12.2 G/DL (ref 11.7–15.4)
SPECIMEN EXP DATE BLD: NORMAL

## 2019-08-01 PROCEDURE — C1776 JOINT DEVICE (IMPLANTABLE): HCPCS | Performed by: ORTHOPAEDIC SURGERY

## 2019-08-01 PROCEDURE — 74011250636 HC RX REV CODE- 250/636: Performed by: ANESTHESIOLOGY

## 2019-08-01 PROCEDURE — 97161 PT EVAL LOW COMPLEX 20 MIN: CPT

## 2019-08-01 PROCEDURE — 86900 BLOOD TYPING SEROLOGIC ABO: CPT

## 2019-08-01 PROCEDURE — 77030018547 HC SUT ETHBND1 J&J -B: Performed by: ORTHOPAEDIC SURGERY

## 2019-08-01 PROCEDURE — 77030003665 HC NDL SPN BBMI -A: Performed by: ANESTHESIOLOGY

## 2019-08-01 PROCEDURE — 76210000016 HC OR PH I REC 1 TO 1.5 HR: Performed by: ORTHOPAEDIC SURGERY

## 2019-08-01 PROCEDURE — 74011250636 HC RX REV CODE- 250/636: Performed by: ORTHOPAEDIC SURGERY

## 2019-08-01 PROCEDURE — 97165 OT EVAL LOW COMPLEX 30 MIN: CPT

## 2019-08-01 PROCEDURE — 77030019940 HC BLNKT HYPOTHRM STRY -B: Performed by: ANESTHESIOLOGY

## 2019-08-01 PROCEDURE — 97110 THERAPEUTIC EXERCISES: CPT

## 2019-08-01 PROCEDURE — 72170 X-RAY EXAM OF PELVIS: CPT

## 2019-08-01 PROCEDURE — 76060000034 HC ANESTHESIA 1.5 TO 2 HR: Performed by: ORTHOPAEDIC SURGERY

## 2019-08-01 PROCEDURE — 77030013708 HC HNDPC SUC IRR PULS STRY –B: Performed by: ORTHOPAEDIC SURGERY

## 2019-08-01 PROCEDURE — 77030006835 HC BLD SAW SAG STRY -B: Performed by: ORTHOPAEDIC SURGERY

## 2019-08-01 PROCEDURE — 65270000029 HC RM PRIVATE

## 2019-08-01 PROCEDURE — 77030018836 HC SOL IRR NACL ICUM -A: Performed by: ORTHOPAEDIC SURGERY

## 2019-08-01 PROCEDURE — 76010000162 HC OR TIME 1.5 TO 2 HR INTENSV-TIER 1: Performed by: ORTHOPAEDIC SURGERY

## 2019-08-01 PROCEDURE — 77030007880 HC KT SPN EPDRL BBMI -B: Performed by: ANESTHESIOLOGY

## 2019-08-01 PROCEDURE — 77030002966 HC SUT PDS J&J -A: Performed by: ORTHOPAEDIC SURGERY

## 2019-08-01 PROCEDURE — 82962 GLUCOSE BLOOD TEST: CPT

## 2019-08-01 PROCEDURE — 74011000258 HC RX REV CODE- 258: Performed by: ORTHOPAEDIC SURGERY

## 2019-08-01 PROCEDURE — 74011250637 HC RX REV CODE- 250/637: Performed by: PHYSICIAN ASSISTANT

## 2019-08-01 PROCEDURE — 85018 HEMOGLOBIN: CPT

## 2019-08-01 PROCEDURE — 74011250636 HC RX REV CODE- 250/636

## 2019-08-01 PROCEDURE — 86580 TB INTRADERMAL TEST: CPT | Performed by: ORTHOPAEDIC SURGERY

## 2019-08-01 PROCEDURE — 74011000302 HC RX REV CODE- 302: Performed by: ORTHOPAEDIC SURGERY

## 2019-08-01 PROCEDURE — 77030002933 HC SUT MCRYL J&J -A: Performed by: ORTHOPAEDIC SURGERY

## 2019-08-01 PROCEDURE — 77030008467 HC STPLR SKN COVD -B: Performed by: ORTHOPAEDIC SURGERY

## 2019-08-01 PROCEDURE — 74011000250 HC RX REV CODE- 250

## 2019-08-01 PROCEDURE — 77030020263 HC SOL INJ SOD CL0.9% LFCR 1000ML

## 2019-08-01 PROCEDURE — 0SR90JA REPLACEMENT OF RIGHT HIP JOINT WITH SYNTHETIC SUBSTITUTE, UNCEMENTED, OPEN APPROACH: ICD-10-PCS | Performed by: ORTHOPAEDIC SURGERY

## 2019-08-01 PROCEDURE — 97530 THERAPEUTIC ACTIVITIES: CPT

## 2019-08-01 PROCEDURE — 74011000250 HC RX REV CODE- 250: Performed by: ORTHOPAEDIC SURGERY

## 2019-08-01 PROCEDURE — 74011250636 HC RX REV CODE- 250/636: Performed by: PHYSICIAN ASSISTANT

## 2019-08-01 PROCEDURE — 94760 N-INVAS EAR/PLS OXIMETRY 1: CPT

## 2019-08-01 PROCEDURE — 36415 COLL VENOUS BLD VENIPUNCTURE: CPT

## 2019-08-01 DEVICE — STEM FEM SZ 5 HIP STD OFFSET CLLRD CEMENTLESS 12/14 TAPR: Type: IMPLANTABLE DEVICE | Site: HIP | Status: FUNCTIONAL

## 2019-08-01 DEVICE — INSERT MDM X3 22.2MM 38D -- RESTORATION: Type: IMPLANTABLE DEVICE | Site: HIP | Status: FUNCTIONAL

## 2019-08-01 DEVICE — SHELL ACET CLUS H 50D TRTANIUM -- TRIDENT II: Type: IMPLANTABLE DEVICE | Site: HIP | Status: FUNCTIONAL

## 2019-08-01 DEVICE — HEAD FEM DIA22.225MM +4MM OFFSET 12/14 TAPR HIP MTL ARTC EZ: Type: IMPLANTABLE DEVICE | Site: HIP | Status: FUNCTIONAL

## 2019-08-01 DEVICE — LINER MDM COCR 38MM D --: Type: IMPLANTABLE DEVICE | Site: HIP | Status: FUNCTIONAL

## 2019-08-01 RX ORDER — KETOROLAC TROMETHAMINE 30 MG/ML
INJECTION, SOLUTION INTRAMUSCULAR; INTRAVENOUS AS NEEDED
Status: DISCONTINUED | OUTPATIENT
Start: 2019-08-01 | End: 2019-08-01 | Stop reason: HOSPADM

## 2019-08-01 RX ORDER — ONDANSETRON 2 MG/ML
INJECTION INTRAMUSCULAR; INTRAVENOUS AS NEEDED
Status: DISCONTINUED | OUTPATIENT
Start: 2019-08-01 | End: 2019-08-01 | Stop reason: HOSPADM

## 2019-08-01 RX ORDER — ZOLPIDEM TARTRATE 5 MG/1
5 TABLET ORAL
Status: DISCONTINUED | OUTPATIENT
Start: 2019-08-01 | End: 2019-08-02 | Stop reason: HOSPADM

## 2019-08-01 RX ORDER — LIDOCAINE HYDROCHLORIDE 20 MG/ML
INJECTION, SOLUTION EPIDURAL; INFILTRATION; INTRACAUDAL; PERINEURAL AS NEEDED
Status: DISCONTINUED | OUTPATIENT
Start: 2019-08-01 | End: 2019-08-01 | Stop reason: HOSPADM

## 2019-08-01 RX ORDER — SODIUM CHLORIDE 0.9 % (FLUSH) 0.9 %
5-40 SYRINGE (ML) INJECTION EVERY 8 HOURS
Status: DISCONTINUED | OUTPATIENT
Start: 2019-08-01 | End: 2019-08-02 | Stop reason: HOSPADM

## 2019-08-01 RX ORDER — HYDROMORPHONE HYDROCHLORIDE 2 MG/ML
1 INJECTION, SOLUTION INTRAMUSCULAR; INTRAVENOUS; SUBCUTANEOUS
Status: DISCONTINUED | OUTPATIENT
Start: 2019-08-01 | End: 2019-08-02 | Stop reason: HOSPADM

## 2019-08-01 RX ORDER — NALOXONE HYDROCHLORIDE 0.4 MG/ML
.2-.4 INJECTION, SOLUTION INTRAMUSCULAR; INTRAVENOUS; SUBCUTANEOUS
Status: DISCONTINUED | OUTPATIENT
Start: 2019-08-01 | End: 2019-08-02 | Stop reason: HOSPADM

## 2019-08-01 RX ORDER — ALBUTEROL SULFATE 0.83 MG/ML
2.5 SOLUTION RESPIRATORY (INHALATION)
Status: DISCONTINUED | OUTPATIENT
Start: 2019-08-01 | End: 2019-08-02 | Stop reason: HOSPADM

## 2019-08-01 RX ORDER — LANOLIN ALCOHOL/MO/W.PET/CERES
400 CREAM (GRAM) TOPICAL
Status: DISCONTINUED | OUTPATIENT
Start: 2019-08-01 | End: 2019-08-02 | Stop reason: HOSPADM

## 2019-08-01 RX ORDER — DEXAMETHASONE SODIUM PHOSPHATE 4 MG/ML
INJECTION, SOLUTION INTRA-ARTICULAR; INTRALESIONAL; INTRAMUSCULAR; INTRAVENOUS; SOFT TISSUE AS NEEDED
Status: DISCONTINUED | OUTPATIENT
Start: 2019-08-01 | End: 2019-08-01 | Stop reason: HOSPADM

## 2019-08-01 RX ORDER — MIDAZOLAM HYDROCHLORIDE 1 MG/ML
INJECTION, SOLUTION INTRAMUSCULAR; INTRAVENOUS AS NEEDED
Status: DISCONTINUED | OUTPATIENT
Start: 2019-08-01 | End: 2019-08-01 | Stop reason: HOSPADM

## 2019-08-01 RX ORDER — CELECOXIB 200 MG/1
200 CAPSULE ORAL EVERY 12 HOURS
Status: DISCONTINUED | OUTPATIENT
Start: 2019-08-01 | End: 2019-08-02 | Stop reason: HOSPADM

## 2019-08-01 RX ORDER — AMOXICILLIN 250 MG
2 CAPSULE ORAL DAILY
Status: DISCONTINUED | OUTPATIENT
Start: 2019-08-02 | End: 2019-08-02 | Stop reason: HOSPADM

## 2019-08-01 RX ORDER — BUPIVACAINE HYDROCHLORIDE 7.5 MG/ML
INJECTION, SOLUTION INTRASPINAL
Status: COMPLETED | OUTPATIENT
Start: 2019-08-01 | End: 2019-08-01

## 2019-08-01 RX ORDER — DIPHENHYDRAMINE HYDROCHLORIDE 50 MG/ML
INJECTION, SOLUTION INTRAMUSCULAR; INTRAVENOUS AS NEEDED
Status: DISCONTINUED | OUTPATIENT
Start: 2019-08-01 | End: 2019-08-01 | Stop reason: HOSPADM

## 2019-08-01 RX ORDER — DIPHENHYDRAMINE HCL 25 MG
25 CAPSULE ORAL
Status: DISCONTINUED | OUTPATIENT
Start: 2019-08-01 | End: 2019-08-02 | Stop reason: HOSPADM

## 2019-08-01 RX ORDER — HYDROMORPHONE HYDROCHLORIDE 2 MG/ML
0.5 INJECTION, SOLUTION INTRAMUSCULAR; INTRAVENOUS; SUBCUTANEOUS
Status: DISCONTINUED | OUTPATIENT
Start: 2019-08-01 | End: 2019-08-01 | Stop reason: HOSPADM

## 2019-08-01 RX ORDER — ASPIRIN 81 MG/1
81 TABLET ORAL EVERY 12 HOURS
Status: DISCONTINUED | OUTPATIENT
Start: 2019-08-01 | End: 2019-08-02 | Stop reason: HOSPADM

## 2019-08-01 RX ORDER — CEFAZOLIN SODIUM/WATER 2 G/20 ML
2 SYRINGE (ML) INTRAVENOUS EVERY 8 HOURS
Status: COMPLETED | OUTPATIENT
Start: 2019-08-01 | End: 2019-08-02

## 2019-08-01 RX ORDER — ASPIRIN 81 MG/1
81 TABLET ORAL EVERY 12 HOURS
Qty: 60 TAB | Refills: 0 | Status: ON HOLD | OUTPATIENT
Start: 2019-08-02 | End: 2019-12-23 | Stop reason: SDUPTHER

## 2019-08-01 RX ORDER — ACETAMINOPHEN 10 MG/ML
1000 INJECTION, SOLUTION INTRAVENOUS ONCE
Status: COMPLETED | OUTPATIENT
Start: 2019-08-01 | End: 2019-08-01

## 2019-08-01 RX ORDER — ONDANSETRON 4 MG/1
4 TABLET, ORALLY DISINTEGRATING ORAL
Status: DISCONTINUED | OUTPATIENT
Start: 2019-08-01 | End: 2019-08-02 | Stop reason: HOSPADM

## 2019-08-01 RX ORDER — PANTOPRAZOLE SODIUM 40 MG/1
40 TABLET, DELAYED RELEASE ORAL
Status: DISCONTINUED | OUTPATIENT
Start: 2019-08-02 | End: 2019-08-02 | Stop reason: HOSPADM

## 2019-08-01 RX ORDER — SODIUM CHLORIDE 0.9 % (FLUSH) 0.9 %
5-40 SYRINGE (ML) INJECTION AS NEEDED
Status: DISCONTINUED | OUTPATIENT
Start: 2019-08-01 | End: 2019-08-02 | Stop reason: HOSPADM

## 2019-08-01 RX ORDER — CEFAZOLIN SODIUM/WATER 2 G/20 ML
2 SYRINGE (ML) INTRAVENOUS ONCE
Status: COMPLETED | OUTPATIENT
Start: 2019-08-01 | End: 2019-08-01

## 2019-08-01 RX ORDER — EPHEDRINE SULFATE 50 MG/ML
INJECTION, SOLUTION INTRAVENOUS AS NEEDED
Status: DISCONTINUED | OUTPATIENT
Start: 2019-08-01 | End: 2019-08-01 | Stop reason: HOSPADM

## 2019-08-01 RX ORDER — SODIUM CHLORIDE, SODIUM LACTATE, POTASSIUM CHLORIDE, CALCIUM CHLORIDE 600; 310; 30; 20 MG/100ML; MG/100ML; MG/100ML; MG/100ML
75 INJECTION, SOLUTION INTRAVENOUS CONTINUOUS
Status: DISCONTINUED | OUTPATIENT
Start: 2019-08-01 | End: 2019-08-01

## 2019-08-01 RX ORDER — ROPIVACAINE HYDROCHLORIDE 2 MG/ML
INJECTION, SOLUTION EPIDURAL; INFILTRATION; PERINEURAL AS NEEDED
Status: DISCONTINUED | OUTPATIENT
Start: 2019-08-01 | End: 2019-08-01 | Stop reason: HOSPADM

## 2019-08-01 RX ORDER — SODIUM CHLORIDE 9 MG/ML
100 INJECTION, SOLUTION INTRAVENOUS CONTINUOUS
Status: DISCONTINUED | OUTPATIENT
Start: 2019-08-01 | End: 2019-08-02 | Stop reason: HOSPADM

## 2019-08-01 RX ORDER — TRANEXAMIC ACID 100 MG/ML
INJECTION, SOLUTION INTRAVENOUS AS NEEDED
Status: DISCONTINUED | OUTPATIENT
Start: 2019-08-01 | End: 2019-08-01 | Stop reason: HOSPADM

## 2019-08-01 RX ORDER — ACETAMINOPHEN 500 MG
1000 TABLET ORAL EVERY 6 HOURS
Status: DISCONTINUED | OUTPATIENT
Start: 2019-08-02 | End: 2019-08-02 | Stop reason: HOSPADM

## 2019-08-01 RX ORDER — OXYCODONE HYDROCHLORIDE 5 MG/1
10 TABLET ORAL
Status: DISCONTINUED | OUTPATIENT
Start: 2019-08-01 | End: 2019-08-02 | Stop reason: HOSPADM

## 2019-08-01 RX ORDER — OXYCODONE HYDROCHLORIDE 10 MG/1
10 TABLET ORAL
Qty: 50 TAB | Refills: 0 | Status: SHIPPED | OUTPATIENT
Start: 2019-08-01 | End: 2019-08-04

## 2019-08-01 RX ORDER — VANCOMYCIN HYDROCHLORIDE 1 G/20ML
INJECTION, POWDER, LYOPHILIZED, FOR SOLUTION INTRAVENOUS AS NEEDED
Status: DISCONTINUED | OUTPATIENT
Start: 2019-08-01 | End: 2019-08-01 | Stop reason: HOSPADM

## 2019-08-01 RX ORDER — OXYCODONE HYDROCHLORIDE 5 MG/1
5 TABLET ORAL
Status: DISCONTINUED | OUTPATIENT
Start: 2019-08-01 | End: 2019-08-01 | Stop reason: HOSPADM

## 2019-08-01 RX ORDER — DEXAMETHASONE SODIUM PHOSPHATE 100 MG/10ML
10 INJECTION INTRAMUSCULAR; INTRAVENOUS ONCE
Status: COMPLETED | OUTPATIENT
Start: 2019-08-02 | End: 2019-08-02

## 2019-08-01 RX ORDER — PROPOFOL 10 MG/ML
INJECTION, EMULSION INTRAVENOUS
Status: DISCONTINUED | OUTPATIENT
Start: 2019-08-01 | End: 2019-08-01 | Stop reason: HOSPADM

## 2019-08-01 RX ORDER — OXYCODONE AND ACETAMINOPHEN 10; 325 MG/1; MG/1
1 TABLET ORAL AS NEEDED
Status: DISCONTINUED | OUTPATIENT
Start: 2019-08-01 | End: 2019-08-01 | Stop reason: HOSPADM

## 2019-08-01 RX ORDER — SODIUM CHLORIDE 0.9 % (FLUSH) 0.9 %
5-40 SYRINGE (ML) INJECTION AS NEEDED
Status: DISCONTINUED | OUTPATIENT
Start: 2019-08-01 | End: 2019-08-01 | Stop reason: HOSPADM

## 2019-08-01 RX ORDER — NEOMYCIN AND POLYMYXIN B SULFATES 40; 200000 MG/ML; [USP'U]/ML
SOLUTION IRRIGATION AS NEEDED
Status: DISCONTINUED | OUTPATIENT
Start: 2019-08-01 | End: 2019-08-01 | Stop reason: HOSPADM

## 2019-08-01 RX ORDER — SODIUM CHLORIDE 0.9 % (FLUSH) 0.9 %
5-40 SYRINGE (ML) INJECTION EVERY 8 HOURS
Status: DISCONTINUED | OUTPATIENT
Start: 2019-08-01 | End: 2019-08-01 | Stop reason: HOSPADM

## 2019-08-01 RX ADMIN — SODIUM CHLORIDE 100 ML/HR: 900 INJECTION, SOLUTION INTRAVENOUS at 22:28

## 2019-08-01 RX ADMIN — CELECOXIB 200 MG: 200 CAPSULE ORAL at 21:15

## 2019-08-01 RX ADMIN — TRANEXAMIC ACID 1000 MG: 100 INJECTION, SOLUTION INTRAVENOUS at 09:18

## 2019-08-01 RX ADMIN — ACETAMINOPHEN 1000 MG: 10 INJECTION, SOLUTION INTRAVENOUS at 15:23

## 2019-08-01 RX ADMIN — Medication 1 AMPULE: at 21:15

## 2019-08-01 RX ADMIN — Medication 2 G: at 16:52

## 2019-08-01 RX ADMIN — BUPIVACAINE HYDROCHLORIDE 9 MG: 7.5 INJECTION, SOLUTION INTRASPINAL at 09:15

## 2019-08-01 RX ADMIN — SODIUM CHLORIDE, SODIUM LACTATE, POTASSIUM CHLORIDE, AND CALCIUM CHLORIDE 75 ML/HR: 600; 310; 30; 20 INJECTION, SOLUTION INTRAVENOUS at 07:28

## 2019-08-01 RX ADMIN — ONDANSETRON 4 MG: 2 INJECTION INTRAMUSCULAR; INTRAVENOUS at 09:35

## 2019-08-01 RX ADMIN — Medication 1 AMPULE: at 15:21

## 2019-08-01 RX ADMIN — LIDOCAINE HYDROCHLORIDE 20 MG: 20 INJECTION, SOLUTION EPIDURAL; INFILTRATION; INTRACAUDAL; PERINEURAL at 09:25

## 2019-08-01 RX ADMIN — MIDAZOLAM HYDROCHLORIDE 1 MG: 1 INJECTION, SOLUTION INTRAMUSCULAR; INTRAVENOUS at 09:08

## 2019-08-01 RX ADMIN — SODIUM CHLORIDE, SODIUM LACTATE, POTASSIUM CHLORIDE, AND CALCIUM CHLORIDE: 600; 310; 30; 20 INJECTION, SOLUTION INTRAVENOUS at 09:31

## 2019-08-01 RX ADMIN — SODIUM CHLORIDE 100 ML/HR: 900 INJECTION, SOLUTION INTRAVENOUS at 12:00

## 2019-08-01 RX ADMIN — EPHEDRINE SULFATE 10 MG: 50 INJECTION, SOLUTION INTRAVENOUS at 09:46

## 2019-08-01 RX ADMIN — PROPOFOL 50 MCG/KG/MIN: 10 INJECTION, EMULSION INTRAVENOUS at 09:17

## 2019-08-01 RX ADMIN — MIDAZOLAM HYDROCHLORIDE 1 MG: 1 INJECTION, SOLUTION INTRAMUSCULAR; INTRAVENOUS at 09:05

## 2019-08-01 RX ADMIN — Medication 10 ML: at 21:22

## 2019-08-01 RX ADMIN — EPHEDRINE SULFATE 10 MG: 50 INJECTION, SOLUTION INTRAVENOUS at 10:16

## 2019-08-01 RX ADMIN — Medication 2 G: at 08:59

## 2019-08-01 RX ADMIN — Medication 400 MG: at 21:15

## 2019-08-01 RX ADMIN — DEXAMETHASONE SODIUM PHOSPHATE 10 MG: 4 INJECTION, SOLUTION INTRA-ARTICULAR; INTRALESIONAL; INTRAMUSCULAR; INTRAVENOUS; SOFT TISSUE at 09:31

## 2019-08-01 RX ADMIN — EPHEDRINE SULFATE 10 MG: 50 INJECTION, SOLUTION INTRAVENOUS at 09:56

## 2019-08-01 RX ADMIN — ASPIRIN 81 MG: 81 TABLET ORAL at 21:16

## 2019-08-01 RX ADMIN — SODIUM CHLORIDE, SODIUM LACTATE, POTASSIUM CHLORIDE, AND CALCIUM CHLORIDE: 600; 310; 30; 20 INJECTION, SOLUTION INTRAVENOUS at 08:59

## 2019-08-01 RX ADMIN — Medication 3 AMPULE: at 07:28

## 2019-08-01 RX ADMIN — DIPHENHYDRAMINE HYDROCHLORIDE 6.25 MG: 50 INJECTION, SOLUTION INTRAMUSCULAR; INTRAVENOUS at 10:15

## 2019-08-01 RX ADMIN — TUBERCULIN PURIFIED PROTEIN DERIVATIVE 5 UNITS: 5 INJECTION, SOLUTION INTRADERMAL at 07:28

## 2019-08-01 RX ADMIN — EPHEDRINE SULFATE 10 MG: 50 INJECTION, SOLUTION INTRAVENOUS at 09:38

## 2019-08-01 NOTE — ANESTHESIA POSTPROCEDURE EVALUATION
Procedure(s):  RIGHT TOTAL HIP ARTHOPLASTY .     spinal    Anesthesia Post Evaluation      Multimodal analgesia: multimodal analgesia used between 6 hours prior to anesthesia start to PACU discharge  Patient location during evaluation: PACU  Patient participation: complete - patient participated  Level of consciousness: awake  Pain management: adequate  Airway patency: patent  Anesthetic complications: no  Cardiovascular status: acceptable  Respiratory status: acceptable  Hydration status: acceptable  Post anesthesia nausea and vomiting:  none      Vitals Value Taken Time   /56 8/1/2019 11:09 AM   Temp 36.5 °C (97.7 °F) 8/1/2019 10:39 AM   Pulse 56 8/1/2019 11:09 AM   Resp 14 8/1/2019 11:09 AM   SpO2 100 % 8/1/2019 11:09 AM

## 2019-08-01 NOTE — PERIOP NOTES
TRANSFER - OUT REPORT:    Verbal report given to [facility] RN  on Yamilex Ross  being transferred to [facility] for routine post - op       Report consisted of patients Situation, Background, Assessment and   Recommendations(SBAR). Information from the following report(s) SBAR was reviewed with the receiving nurse. Opportunity for questions and clarification was provided.       Patient transported with:   [employer]

## 2019-08-01 NOTE — PROGRESS NOTES
Patient called for pain medication, offered oxycodone 5 mg tab which patient refused when she found out it was oxycodone, Lennie Miranda early because patient just wanted tylenol.

## 2019-08-01 NOTE — PROGRESS NOTES
[address]  Face to Face Encounter    Patients Name: Elizabeth Augustin    YOB: 1945    Ordering Physician: Star Maria    Primary Diagnosis: Unilateral primary osteoarthritis, right hip [M16.11]  Arthritis of right hip [M16.11]  Arthritis of right hip [M16.11]    Date of Face to Face:   8/1/2019                                  Face to Face Encounter findings are related to primary reason for home care:   yes. 1. I certify that the patient needs intermittent care as follows: physical therapy: strengthening and stretching/ROM    2. I certify that this patient is homebound, that is: 1) patient requires the use of a walker device, special transportation, or assistance of another to leave the home; or 2) patient's condition makes leaving the home medically contraindicated; and 3) patient has a normal inability to leave the home and leaving the home requires considerable and taxing effort. Patient may leave the home for infrequent and short duration for medical reasons, and occasional absences for non-medical reasons. Homebound status is due to the following functional limitations: Patient currently under activity restrictions secondary to recent surgical procedure, this hinders their ability to safely leave the home. 3. I certify that this patient is under my care and that I, or a nurse practitioner or  [id number], or clinical nurse specialist, or certified nurse midwife, working with me, had a Face-to-Face Encounter that meets the physician Face-to-Face Encounter requirements.   The following are the clinical findings from the [address] [place] [address] encounter that support the need for skilled services and is a summary of the encounter: progress notes    See attached progess note      ARMEN Ortiz  8/1/2019      THE FOLLOWING TO BE COMPLETED BY THE COMMUNITY PHYSICIAN:    I concur with the findings described above from the [facility] encounter that this patient is homebound and in need of a skilled service.     Certifying Physician: _____________________________________      Printed Certifying Physician Name: _____________________________________    Date: _________________

## 2019-08-01 NOTE — PROGRESS NOTES
Problem: Mobility Impaired (Adult and Pediatric)  Goal: *Acute Goals and Plan of Care (Insert Text)  Description  GOALS (1-4 days):  (1.)Ms. Eve Giraldo will move from supine to sit and sit to supine  in bed with SUPERVISION. (2.)Ms. Eve Giraldo will transfer from bed to chair and chair to bed with SUPERVISION using the least restrictive device. (3.)Ms. Eve Giraldo will ambulate with SUPERVISION for 250 feet with the least restrictive device. (4.)Ms. Eve Giraldo will ambulate up/down 2 steps with appropriate railing with MINIMAL ASSIST with no device. (5.)Ms. Eve Giraldo will state/observe MCKENZIE precautions with 0 verbal cues. ________________________________________________________________________________________________     Outcome: Progressing Towards Goal     PHYSICAL THERAPY JOINT CAMP MCKENZIE: Initial Assessment and PM 8/1/2019  INPATIENT: Hospital Day: 1  Payor: Patricia Quinones / Plan: [address] [place] [address] HMO / Product Type: [employer] /      NAME/AGE/GENDER: Kristofer Lanza is a 75 y.o. female   PRIMARY DIAGNOSIS:  Unilateral primary osteoarthritis, right hip [M16.11]   Procedure(s) and Anesthesia Type:     * RIGHT TOTAL HIP ARTHOPLASTY  - Spinal (Right)  ICD-10: Treatment Diagnosis:    Pain in Right Hip (M25.551)  Stiffness of Right Hip, Not elsewhere classified (M25.651)  Difficulty in walking, Not elsewhere classified (R26.2)      ASSESSMENT:     Ms. Eve Giraldo presents s/p R MCKENZIE. Patient demonstrates decreased R LE strength and ROM and decreased independence with mobility. Patient is independent at baseline and would benefit from therapy to facilitate a return to prior level. Patient will return home at d/c. She lives alone but will have family/friends to assist her as needed.     This section established at most recent assessment   PROBLEM LIST (Impairments causing functional limitations):  Decreased Strength  Decreased ADL/Functional Activities  Decreased Transfer Abilities  Decreased Ambulation Ability/Technique  Decreased Balance  Increased Pain  Decreased Flexibility/Joint Mobility  Decreased Knowledge of Precautions  Decreased Bennet with Home Exercise Program   INTERVENTIONS PLANNED: (Benefits and precautions of physical therapy have been discussed with the patient.)  bed mobility  gait training  home exercise program (HEP)  Range of Motion: active/assisted/passive  Therapeutic Activities  therapeutic exercise/strengthening  transfer training  Group Therapy     TREATMENT PLAN: Frequency/Duration: Follow patient BID for duration of hospital stay to address above goals. Rehabilitation Potential For Stated Goals: Good     RECOMMENDED REHABILITATION/EQUIPMENT: (at time of discharge pending progress): Continue Skilled Therapy. HISTORY:   History of Present Injury/Illness (Reason for Referral):  R MCKENZIE  Past Medical History/Comorbidities:   Ms. Adalberto Carrel  has a past medical history of Aortic atherosclerosis ([address] Arthritis, Ascending aortic aneurysm ([address] Chronic airway obstruction, not elsewhere classified, GERD (gastroesophageal reflux disease), Hiatal hernia, Hyperlipemia, Hypertension, Osteopenia, and Vitamin D deficiency. Ms. Adalberto Carrel  has a past surgical history that includes hx carpal tunnel release (Bilateral, 2004, 2005); hx tonsillectomy; hx cataract removal (07/10/2019); and hx tubal ligation.   Social History/Living Environment:   Home Environment: Private residence  # Steps to Enter: 2  One/Two Story Residence: Two story, live on 1st floor  # of Interior Steps: 14  Living Alone: Yes  Support Systems: Family member(s), Friends \ neighbors  Patient Expects to be Discharged to[de-identified] Private residence  Current DME Used/Available at Home: Commode, bedside, Shower chair, Walker, rolling  Tub or Shower Type: Tub/Shower combination  Prior Level of Function/Work/Activity:  Independent   Number of Personal Factors/Comorbidities that affect the Plan of Care: 0: LOW COMPLEXITY   EXAMINATION: Most Recent Physical Functioning:      Gross Assessment  AROM: Within functional limits(L LE)  Strength: Within functional limits(L LE)  Coordination: Generally decreased, functional  Sensation: Impaired(decreased proprioception)                RLE Strength  R Hip Flexion: 2+  R Hip ABduction: 2  R Knee Flexion: 2+  R Knee Extension: 2+    Bed Mobility  Supine to Sit: Contact guard assistance    Transfers  Sit to Stand: Contact guard assistance  Stand to Sit: Contact guard assistance  Bed to Chair: Minimum assistance    Balance  Sitting: Intact  Standing: With support              Weight Bearing Status  Right Side Weight Bearing: As tolerated  Distance (ft): 10 Feet (ft)  Ambulation - Level of Assistance: Minimal assistance  Assistive Device: Walker, rolling  Speed/Prabha: Slow  Step Length: Left shortened;Right shortened  Stance: Right decreased  Gait Abnormalities: Antalgic;Decreased step clearance  Interventions: Safety awareness training;Verbal cues     Braces/Orthotics: none           Body Structures Involved:  Joints  Muscles Body Functions Affected: Movement Related Activities and Participation Affected: Mobility   Number of elements that affect the Plan of Care: 4+: HIGH COMPLEXITY   CLINICAL PRESENTATION:   Presentation: Stable and uncomplicated: LOW COMPLEXITY   CLINICAL DECISION MAKIN[address] [place], [place] [id number] AM-PAC 6 Clicks   Basic Mobility Inpatient Short Form  How much difficulty does the patient currently have. .. Unable A Lot A Little None   1. Turning over in bed (including adjusting bedclothes, sheets and blankets)? ? 1   ? 2   ? 3   ? 4   2. Sitting down on and standing up from a chair with arms ( e.g., wheelchair, bedside commode, etc.)   ? 1   ? 2   ? 3   ? 4   3. Moving from lying on back to sitting on the side of the bed?   ? 1   ? 2   ? 3   ? 4   How much help from another person does the patient currently need. .. Total A Lot A Little None   4.   Moving to and from a bed to a chair (including a wheelchair)? ? 1   ? 2   ? 3   ? 4   5. Need to walk in hospital room? ? 1   ? 2   ? 3   ? 4   6. Climbing 3-5 steps with a railing? ? 1   ? 2   ? 3   ? 4   © 2007, Trustees of [address] [place] [address] [address] [address], under license to [email or web address]. All rights reserved     Score:  Initial: 18 Most Recent: X (Date: -- )    Interpretation of Tool:  Represents activities that are increasingly more difficult (i.e. Bed mobility, Transfers, Gait). Medical Necessity:     Patient is expected to demonstrate progress in strength, range of motion, balance and coordination   to increase independence with mobility and HEP. .  Reason for Services/Other Comments:  Patient continues to require skilled intervention due to decreased strength and coordination and decreased independence with mobility s/p MCKENZIE. .   Use of outcome tool(s) and clinical judgement create a POC that gives a: Clear prediction of patient's progress: LOW COMPLEXITY            TREATMENT:   (In addition to Assessment/Re-Assessment sessions the following treatments were rendered)     Pre-treatment Symptoms/Complaints:  Patient ready to get up. Pain: Initial:   Pain Intensity 1: 2  Pain Location 1: Hip  Pain Orientation 1: Right  Pain Intervention(s) 1: Ambulation/Increased Activity, Exercise, Repositioned  Post Session:  2/10     Therapeutic Exercise: (10 Minutes):  Exercises per grid below to improve mobility and strength. Required minimal verbal cues to promote proper body alignment. Progressed repetitions as indicated. Date:  8/1/19 Date:   Date:     ACTIVITY/EXERCISE AM PM AM PM AM PM   GROUP THERAPY  ?  ?  ?  ?  ?  ?    Ankle Pumps  10       Quad Sets  10       Gluteal Sets  10       Hip ABd/ADduction  10       Straight Leg Raises         Knee Slides  10       Short Arc Quads  10       Long Arc Quads         Chair Slides                  B = bilateral; AA = active assistive; A = active; P = passive      Treatment/Session Assessment:     Response to Treatment:  Patient participated well. Good movement but some decrease in coordination/proprioception. Should progress well. Education:  ? Home Exercises  ? Fall Precautions  ? Hip Precautions ? D/C Instruction Review  ? Knee/Hip Prosthesis Review  ? Walker Management/Safety ? Adaptive Equipment as Needed       Interdisciplinary Collaboration:   Physical Therapist  Occupational Therapist  Registered Nurse    After treatment position/precautions:   Up in chair  Bed/Chair-wheels locked  Call light within reach  Family at bedside    Compliance with Program/Exercises: Will assess as treatment progresses. Recommendations/Intent for next treatment session:  Treatment next visit will focus on increasing Ms. Nails's independence with bed mobility, transfers, gait training, strength/ROM exercises, modalities for pain, and patient education.       Total Treatment Duration:  PT Patient Time In/Time Out  Time In: 1300  Time Out: Melinda [address]

## 2019-08-01 NOTE — PROGRESS NOTES
Care Management Interventions  Transition of Care Consult (CM Consult): [address]: Yes  Physical Therapy Consult: Yes  Current Support Network: Lives with Spouse  Confirm Follow Up Transport: Family  Plan discussed with Pt/Family/Caregiver: Yes  Discharge Location  Discharge Placement: Home with home health  SW met with pt to discuss Right MCKENZIE. Pt lives alone but does have people to assist and check on her as needed. Pt resides in [facility] and is agreeable to [facility]. [facility] referral and F2F completed. Pt reports she has a RW and BSC for home use. No additional needs or questions identified at this time.    José Hassan

## 2019-08-01 NOTE — H&P
The patient has end stage arthritis of the right hip joint. The patient was seen and examined and there are no changes to the patient's orthopedic condition. They have tried conservative treatment for this condition; including antiinflammatories and lifestyle modifications and have failed. The necessity for the joint replacement is still present, and the H&P from the office is still current. The patient will be admitted today forProcedure(s) (LRB):  [facility] .

## 2019-08-01 NOTE — ANESTHESIA PROCEDURE NOTES
Spinal Block    Start time: 8/1/2019 9:13 AM  End time: 8/1/2019 9:15 AM  Performed by: Jo Ann Eugene MD  Authorized by: Jo Ann Eugene MD     Pre-procedure:  Preanesthetic Checklist: patient identified, risks and benefits discussed, anesthesia consent, site marked, patient being monitored and timeout performed    Timeout Time: 09:15          Spinal Block:   Patient Position:  Seated  Prep Region:  Lumbar  Prep: chlorhexidine and patient draped      Location:  L3-4          Needle:   Needle Type:  Pencan  Needle Gauge:  25 G  Attempts:  1      Events: CSF confirmed and no blood with aspiration        Assessment:  Insertion:  Uncomplicated  Patient tolerance:  Patient tolerated the procedure well with no immediate complications

## 2019-08-01 NOTE — PROGRESS NOTES
08/01/19 1539   Oxygen Therapy   O2 Sat (%) 96 %   Pulse via Oximetry 85 beats per minute   O2 Device Room air   O2 Flow Rate (L/min) 0 l/min   Incentive Spirometry Treatment   Actual Volume (ml) 1500 ml   Number of Attempts 2   Patient achieved   1500     Ml/sec on IS. Patient encouraged to do every hour while awake-patient agreed and demonstrated. No shortness of breath or distress noted. BS are clear b/l.    [facility] Camp notes reviewed-

## 2019-08-01 NOTE — ADVANCED PRACTICE NURSE
Total Joint Surgery Preoperative Chart Review      Patient ID:  Gilson Augustin  [id number]  36 y.o.  1945  Surgeon: Dr. Raúl Gupta  Date of Surgery: 2019  Procedure: Total Right Hip Arthroplasty  Primary Care Physician: Sathish English [phone]  Specialty Physician(s):      Subjective:   Gilson Augustin is a 75 y.o. WHITE OR  female who presents for preoperative evaluation for Total Right Hip arthroplasty. This is a preoperative chart review note based on data collected by the nurse at the surgical Pre-Assessment visit.     Past Medical History:   Diagnosis Date    Aortic atherosclerosis ([facility]     Dr. Carol Lemus following     Arthritis     Ascending aortic aneurysm ([facility]     4.2 cm, Followed by Dr. Carol Lemus- per office note dated 18- no change in size; follow up in 3 yrs     Chronic airway obstruction, not elsewhere classified     COPD diagnosed by PCP, pt states no symptoms- if anything very mild     GERD (gastroesophageal reflux disease)     Managed with meds     Hiatal hernia     Hyperlipemia     Pt denies     Hypertension     Managed with meds     Osteopenia     Vitamin D deficiency     10 + yrs ago per pt      Past Surgical History:   Procedure Laterality Date    HX CARPAL TUNNEL RELEASE Bilateral ,     HX CATARACT REMOVAL  07/10/2019    Bilateral     HX TONSILLECTOMY      HX TUBAL LIGATION       Family History   Problem Relation Age of Onset    Diabetes Mother     Cancer Mother         cervical, thyroid w/ thyroidectomy     Stroke Mother     Heart Attack Father     Other Father         collapsed lung; tobacco abuse     Diabetes Sister     Diabetes Brother     Cancer Brother         liver      Social History     Tobacco Use    Smoking status: Former Smoker     Packs/day: 0.50     Years: 30.00     Pack years: 15.00     Last attempt to quit: 2005     Years since quittin.0    Smokeless tobacco: Never Used    Tobacco comment: 10-11 yrs ago quit Substance Use Topics    Alcohol use: Yes     Alcohol/week: 7.0 standard drinks     Types: 7 Glasses of wine per week       Prior to Admission medications    Medication Sig Start Date End Date Taking? Authorizing Provider   esomeprazole (NEXIUM) 40 mg capsule Take 40 mg by mouth daily as needed. Yes Provider, Historical   ascorbic acid, vitamin C, (VITAMIN C) 1,000 mg tablet Take 1,000 mg by mouth daily. Yes Provider, Historical   cholecalciferol, vitamin D3, (VITAMIN D3) 2,000 unit tab Take 1 Tab by mouth daily. Yes Provider, Historical   multivitamin (ONE A DAY) tablet Take 1 Tab by mouth daily. Yes Provider, Historical   fish oil-omega-3 fatty acids 340-1,000 mg capsule Take 1 Cap by mouth daily. Yes Provider, Historical   potassium 99 mg tablet Take 198 mg by mouth nightly. Yes Provider, Historical   magnesium oxide (MAG-OX) 400 mg tablet Take 400 mg by mouth nightly. Yes Provider, Historical   losartan-hydrochlorothiazide (HYZAAR) 100-25 mg per tablet Take 1 Tab by mouth daily. 5/11/16  Yes Oliver Goetz MD     No Known Allergies       Objective:     Physical Exam:   No data found.     ECG:    EKG Results     Procedure 720 Value Units Date/Time    EKG, 12 LEAD, INITIAL [[id number]] Collected:  07/15/19 1004    Order Status:  Completed Updated:  07/15/19 1257     Ventricular Rate 55 BPM      Atrial Rate 55 BPM      P-R Interval 166 ms      QRS Duration 76 ms      Q-T Interval 418 ms      QTC Calculation (Bezet) 399 ms      Calculated P Axis 75 degrees      Calculated R Axis 63 degrees      Calculated T Axis 79 degrees      Diagnosis --     Sinus bradycardia  Septal infarct , age undetermined  Abnormal ECG  No previous ECGs available  Confirmed by TALITA JOHNSTON (), Cesar Palacios ([id number]) on 7/15/2019 12:57:06 PM            Data Review:   Labs:   Recent Results (from the past 24 hour(s))   TYPE & SCREEN    Collection Time: 08/01/19  7:42 AM   Result Value Ref Range    Crossmatch Expiration 08/04/2019 ABO/Rh(D) A POSITIVE     Antibody screen NEG    GLUCOSE, POC    Collection Time: 08/01/19  7:45 AM   Result Value Ref Range    Glucose (POC) 102 (H) 65 - 100 mg/dL     Results for Tigre Sal (MRN [id number]) as of 8/1/2019 15:07   Ref. Range 7/15/2019 08:45   Sodium Latest Ref Range: 136 - 145 mmol/L 139   Potassium Latest Ref Range: 3.5 - 5.1 mmol/L 3.7   Chloride Latest Ref Range: 98 - 107 mmol/L 98   CO2 Latest Ref Range: 21 - 32 mmol/L 32   Anion gap Latest Ref Range: 7 - 16 mmol/L 9   Glucose Latest Ref Range: 65 - 100 mg/dL 94   BUN Latest Ref Range: 8 - 23 MG/DL 11   Creatinine Latest Ref Range: 0.6 - 1.0 MG/DL 0.60   Calcium Latest Ref Range: 8.3 - 10.4 MG/DL 9.9   GFR est non-AA Latest Ref Range: >60 ml/min/1.73m2 >60   GFR est AA Latest Ref Range: >60 ml/min/1.73m2 >60       Problem List:  )  Patient Active Problem List   Diagnosis Code    Hypertension I10    GERD (gastroesophageal reflux disease) K21.9    Hyperlipemia E78.5    Osteopenia M85.80    Aortic atherosclerosis (HCC) I70.0    Chronic airway obstruction, not elsewhere classified J44.9    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 Albuterol every 6 hours as need during hospitalization.          Signed By: CAMILLE Ferrari    August 1, 2019

## 2019-08-01 NOTE — OP NOTES
Total Hip Procedure Note    Fernando Soto,  [id number],  1945    Pre-operative Diagnosis:  Unilateral primary osteoarthritis, right hip [M16.11]  Post-operative Diagnosis: Unilateral primary osteoarthritis, right hip    Procedure: Right Total Hip Arthroplasty    BMI: Body mass index is 24.3 kg/m². Franko Ly Location: [address] [place] [address]  Surgeon: Jennifer Bell MD  Assistant: LILLIANA Jain    Anesthesia: Spinal     EBL: 300    Procedure: Bharath/DePuy MDM Total Hip Arthroplasty     The complexity of total joint surgery requires the use of a skilled first assistant for positioning, retraction and assistance in closure. Fernando Soto was brought to the operating room and positioned on the operating table. Anesthesia was administered. A aceves catheter was placed preoperatively and IV antibiotics were administered. A time out identifying the patient, procedure, operative side and surgeon was administered and charted by the OR Nurse. Fernando Soto was positioned on right lateral decubitus position. The limb was prepped and draped in the usual sterile fashion. The Posterior approach was utilized to expose the hip. The incision was carried through the subcutaneous tissue and underlying fascia with hemostasis obtained using the bovie cauterization. A Charmley retractor was inserted. The short external rotators were divided at their insertion. The sciatic nerve was palpated and identified. Next, a T-shaped capsular incision was accomplished and femoral head was dislocated. The neck was osteotomized in the appropriate position just above the lesser trochanteric region. The neck cut was measured to be 11 mm. Acetabular retractors were placed and the appropriate capsulotomy performed. Soft tissue was removed from the acetabulum. The acetabulum was sequentially reamed. Using trial components, the acetabulum was sized to a 50 mm Trident acetabular cup.   Large cystic areras were bone graft  The acetabular component was impacted to achieve appropriate horizontal tilt, anteversion, bone coverage and stability. 1 screw was  used to stabilize the cup  A metal liner was impacted into position. Utilizing the femoral retractor, the canal was prepared with appropriate lateralization and reamed with the starter reamer. The canal was then broached progressively. The broach was positioned with the appropriate anatomic femoral anteversion. A calcar planar was utilized. A trial reduction with a  +4 neck length was utilized. This was found to be the most stable to flexion greater than 90 degrees and on internal and external rotation. Limb lengths were thought to be equilibrated and with appropriate stability as mentioned above. All trial components were removed. The cementless permanent size 5 std ACTIS stem was impacted into place. A trial reduction was performed and the above neck length was selected. The permanent  MDM  femoral head was impacted into place. Maribeth Jaimessey's hip was reduced and stability was as mentioned above. Copious irrigation was accomplished about the surgical site. The sciatic nerve was palpated and noted to be intact. The capsule was repaired with a #1 PDS and the external rotators were reattached with a #5 Mersilene. A lavage of diluted betadine solution of 17.5 ml Betadine in 500 of 0.9% Normal Saline was allowed to soak in the wound for 3 minutes after implanting of the prosthesis. The wound was irrigated with Saline again before closure. 1 gm of Vancomycin powder was sprinkled about the joint and skin. At final skin closure, full strength betadine was applied to the skin surrounding the skin incision. The operative hip was injected prior to closure for post operative pain management. A #1 PDS suture was used to re-approximate the fascia. Monocryl sutures were used to approximate the subcutaneous layers.  Staples were used to reapproximate the skin edges and a sterile bandage was placed. The patient was then rolled to a supine position. The sponge and needle counts were correct. The patient tolerated the procedure without difficulty and left the operating room in satisfactory condition. Implants:   Implant Name Type Inv.  Item Serial No.  Lot No. LRB No. Used   LOW PROFILE HEX SCREW   5NA  [id number] Right 1   SHELL ACET CLUS H 50D TRTANIUM -- TRIDENT II - [id number]  SHELL ACET CLUS H 50D TRTANIUM -- TRIDENT II [id number] [employer] HOW [id number] Right 1   LINER MDM COCR 38MM D --  - [id number]  LINER MDM COCR 38MM D --  [id number] [employer] HOW [id number] Right 1   INSERT MDM X3 22.2MM 38D -- RESTORATION - [id number]  INSERT MDM X3 22.2MM 38D -- RESTORATION [id number] [employer] HOW [id number] Right 1   STEM FEM TAPR SZ5 STD OFFSET -- ACTIS - [id number]  STEM FEM TAPR SZ5 STD OFFSET -- ACTIS [id number] [facility] [id number] Right 1   HEAD ART/EDWARD BALL 22.225+4 NK --  - [id number]  HEAD ART/EDWARD BALL 22.225+4 NK --  [id number] [facility] [id number] Right 1     Signed By: Sheridan Blakely MD negative

## 2019-08-01 NOTE — PROGRESS NOTES
Patient A/O x 4 pain has been well controlled, denies any nausea, patient has been up in recliner, moving lower extremities with no deficits, patient has voided 350 ml clear yellow urine,all Neuro Vascular checks are WDL as documented, no needs at this time.

## 2019-08-01 NOTE — PERIOP NOTES
TRANSFER - OUT REPORT:    Verbal report given to Jordon Silver RN on Janae Alexander  being transferred to pre op for routine progression of care       Report consisted of patients Situation, Background, Assessment and   Recommendations(SBAR). Information from the following report(s) SBAR, Kardex, [facility] and Recent Results was reviewed with the receiving nurse. Lines:   Peripheral IV 08/01/19 Left Forearm (Active)   Site Assessment Clean, dry, & intact 8/1/2019  7:31 AM   Phlebitis Assessment 0 8/1/2019  7:31 AM   Infiltration Assessment 0 8/1/2019  7:31 AM   Dressing Status Clean, dry, & intact 8/1/2019  7:31 AM   Dressing Type Transparent;Tape 8/1/2019  7:31 AM   Hub Color/Line Status Infusing 8/1/2019  7:31 AM   Action Taken Blood drawn 8/1/2019  7:31 AM        Opportunity for questions and clarification was provided.       Patient transported with:   [employer]

## 2019-08-01 NOTE — ANESTHESIA PREPROCEDURE EVALUATION
Relevant Problems   No relevant active problems       Anesthetic History               Review of Systems / Medical History  Patient summary reviewed and pertinent labs reviewed    Pulmonary    COPD: mild               Neuro/Psych              Cardiovascular    Hypertension              Exercise tolerance: >4 METS  Comments: 4.2 cm ascending thoracic aneurysm   GI/Hepatic/Renal     GERD           Endo/Other        Arthritis     Other Findings              Physical Exam    Airway  Mallampati: II  TM Distance: > 6 cm    Mouth opening: Normal     Cardiovascular    Rhythm: regular           Dental    Dentition: Caps/crowns     Pulmonary                 Abdominal  GI exam deferred       Other Findings            Anesthetic Plan    ASA: 2  Anesthesia type: spinal          Induction: Intravenous  Anesthetic plan and risks discussed with: Patient

## 2019-08-01 NOTE — PERIOP NOTES
TRANSFER - IN REPORT:    Verbal report received from [facility] RN(name) on Katie Pichardo  being received from 3rd floor ortho(unit) for routine progression of care      Report consisted of patients Situation, Background, Assessment and   Recommendations(SBAR). Information from the following report(s) SBAR and MAR was reviewed with the receiving nurse. Opportunity for questions and clarification was provided. Assessment completed upon patients arrival to unit and care assumed.

## 2019-08-01 NOTE — PROGRESS NOTES
Problem: Self Care Deficits Care Plan (Adult)  Goal: *Acute Goals and Plan of Care (Insert Text)  Description  GOALS:   DISCHARGE GOALS (in preparation for going home/rehab):  3 days  1. Ms. Mj Dumont will perform one lower body dressing activity with minimal assistance with adaptive equipment to demonstrate improved functional mobility and safety. 2.  Ms. Mj Dumont will perform one lower body bathing activity with minimal  assistance with adaptive equipment to demonstrate improved functional mobility and safety. 3.  Ms. Mj Dumont will perform toileting/toilet transfer with contact guard assistance with adaptive equipment to demonstrate improved functional mobility and safety. 4.  Ms. Mj Dumont will perform shower transfer with contact guard assistance with adaptive equipment to demonstrate improved functional mobility and safety. 5.  Ms. Mj Dumont will state MCKNEZIE precautions with two verbal cues to demonstrate improved functional mobility and safety. Outcome: Progressing Towards Goal       JOINT CAMP OCCUPATIONAL THERAPY MCKENZIE: Initial Assessment, Daily Note, Treatment Day: Day of Assessment and PM 8/1/2019  INPATIENT: Hospital Day: 1  Payor: Rody Orozco / Plan: [address] [place] [address] HMO / Product Type: Managed Care Medicare /      NAME/AGE/GENDER: Jordyn Leung is a 75 y.o. female   PRIMARY DIAGNOSIS:  Unilateral primary osteoarthritis, right hip [M16.11]   Procedure(s) and Anesthesia Type:     * RIGHT TOTAL HIP ARTHOPLASTY  - Spinal (Right)  ICD-10: Treatment Diagnosis:    Pain in Right Hip (M25.551)  Stiffness of Right Hip, Not elsewhere classified (M25.651)      ASSESSMENT:     Ms. Mj Dumont is s/p right MCKENZIE and presents with decreased weight bearing on right LE and decreased independence with functional mobility and activities of daily living as compared to prior level of function and safety.   Patient would benefit from skilled Occupational Therapy to maximize independence and safety with self-care task and functional mobility. Pt would also benefit from education on lower body adaptive equipment and hip precautions post-surgery in preparation for going home. Patient plans for further rehab at home with home health services and good family support family/friends. OT reviewed therapy schedule and plan of care with patient. Patient was able to transfer and perform self care skills as charted below. Patient instructed to call for assistance when needing to get up from the recliner and all needs in reach. Patient verbalized understanding of call light. Pt up in room and donned underpants edge of chair. Pt moves well and should progress well with self care. This section established at most recent assessment   PROBLEM LIST (Impairments causing functional limitations):  Decreased Strength  Decreased ADL/Functional Activities  Decreased Transfer Abilities  Increased Pain  Increased Fatigue  Decreased Flexibility/Joint Mobility  Decreased Knowledge of Precautions   INTERVENTIONS PLANNED: (Benefits and precautions of occupational therapy have been discussed with the patient.)  Activities of daily living training  Adaptive equipment training  Balance training  Clothing management  Donning&doffing training  Theraputic activity     TREATMENT PLAN: Frequency/Duration: Follow patient 1-2 times to address above goals. Rehabilitation Potential For Stated Goals: Good     RECOMMENDED REHABILITATION/EQUIPMENT: (at time of discharge pending progress): Continue Skilled Therapy and Home Health: Physical Therapy. OCCUPATIONAL PROFILE AND HISTORY:   History of Present Injury/Illness (Reason for Referral): Pt presents this date s/p (right) MCKENZIE.     Past Medical History/Comorbidities:   Ms. Laura Escalona  has a past medical history of Aortic atherosclerosis ([address] Arthritis, Ascending aortic aneurysm ([address] Chronic airway obstruction, not elsewhere classified, GERD (gastroesophageal reflux disease), Hiatal hernia, Hyperlipemia, Hypertension, Osteopenia, and Vitamin D deficiency. Ms. WISEMAN [facility]  has a past surgical history that includes hx carpal tunnel release (Bilateral, 2004, 2005); hx tonsillectomy; hx cataract removal (07/10/2019); and hx tubal ligation. Social History/Living Environment:   Home Environment: Private residence  # Steps to Enter: 2  One/Two Story Residence: Two story, live on 1st floor  # of Interior Steps: 14  Living Alone: Yes  Support Systems: Family member(s), Friends \ neighbors  Patient Expects to be Discharged to[de-identified] Private residence  Current DME Used/Available at Home: Commode, bedside, Shower chair, Walker, rolling  Tub or Shower Type: Tub/Shower combination  Prior Level of Function/Work/Activity:  Independent      Number of Personal Factors/Comorbidities that affect the Plan of Care: Brief history (0):  LOW COMPLEXITY   ASSESSMENT OF OCCUPATIONAL PERFORMANCE[de-identified]   Most Recent Physical Functioning:   Balance  Sitting: Intact  Standing: Intact; With support       Gross Assessment  AROM: Within functional limits(L LE)  Strength: Within functional limits(L LE)  Coordination: Generally decreased, functional  Sensation: Impaired(decreased proprioception)            Coordination  Fine Motor Skills-Upper: Left Intact; Right Intact  Gross Motor Skills-Upper: Left Intact; Right Intact         Mental Status  Neurologic State: Alert  Orientation Level: Oriented X4  Cognition: Appropriate decision making  Perception: Appears intact  Perseveration: No perseveration noted  Safety/Judgement: Awareness of environment          RLE Strength  R Hip Flexion: 2+  R Hip ABduction: 2  R Knee Flexion: 2+  R Knee Extension: 2+     Basic ADLs (From Assessment) Complex ADLs (From Assessment)   Basic ADL  Feeding: Independent  Oral Facial Hygiene/Grooming: Supervision  Bathing: Moderate assistance  Upper Body Dressing: Supervision  Lower Body Dressing:  Moderate assistance  Toileting: Minimum assistance     Grooming/Bathing/Dressing Activities of Daily Living     Cognitive Retraining  Safety/Judgement: Awareness of environment                 Functional Transfers  Bathroom Mobility: Minimum assistance  Toilet Transfer : Minimum assistance  Shower Transfer: Minimum assistance     Bed/Mat Mobility  Supine to Sit: Contact guard assistance  Sit to Stand: Contact guard assistance  Stand to Sit: Contact guard assistance  Bed to Chair: Minimum assistance  Scooting: Additional time         Physical Skills Involved:  Range of Motion  Balance  Strength Cognitive Skills Affected (resulting in the inability to perform in a timely and safe manner):  none  Psychosocial Skills Affected:  Environmental Adaptation   Number of elements that affect the Plan of Care: 3-5:  MODERATE COMPLEXITY   CLINICAL DECISION MAKIN[address] [place] [address] AM-PAC 6 Clicks   Daily Activity Inpatient Short Form  How much help from another person does the patient currently need. .. Total A Lot A Little None   1. Putting on and taking off regular lower body clothing? ? 1   ? 2   ? 3   ? 4   2. Bathing (including washing, rinsing, drying)? ? 1   ? 2   ? 3   ? 4   3. Toileting, which includes using toilet, bedpan or urinal?   ? 1   ? 2   ? 3   ? 4   4. Putting on and taking off regular upper body clothing? ? 1   ? 2   ? 3   ? 4   5. Taking care of personal grooming such as brushing teeth? ? 1   ? 2   ? 3   ? 4   6. Eating meals? ? 1   ? 2   ? 3   ? 4   © , Trustees of [address] [place] [address], under license to [employer]. All rights reserved     Score:  Initial: 20 Most Recent: X (Date: -- )    Interpretation of Tool:  Represents activities that are increasingly more difficult (i.e. Bed mobility, Transfers, Gait). Medical Necessity:     Patient is expected to demonstrate progress in range of motion, balance, coordination and functional technique   to increase independence with self care   .   Reason for Services/Other Comments:  Patient would benefit from skilled Occupational Therapy to maximize independence and safety with self-care task and functional mobility. Use of outcome tool(s) and clinical judgement create a POC that gives a: LOW COMPLEXITY            TREATMENT:   (In addition to Assessment/Re-Assessment sessions the following treatments were rendered)     Pre-treatment Symptoms/Complaints:  no complaint of pain, noted \"pulling\"  Pain: Initial:      Post Session:  0     Therapeutic Activity: (10 min): Therapeutic activities including Bed transfers, Chair transfers and Ambulation on level ground to improve mobility, strength, balance and coordination. Required minimal Safety awareness training;Verbal cues to promote dynamic balance in standing. OT evaluation completed     Treatment/Session Assessment:     Response to Treatment:  pt up in room tolerated well. Education:  ? Home Exercises  ? Fall Precautions  ? Hip Precautions ? Going Home Video  ? Knee/Hip Prosthesis Review  ? Walker Management/Safety ? Adaptive Equipment as Needed       Interdisciplinary Collaboration:   Physical Therapist  Occupational Therapist  Registered Nurse    After treatment position/precautions:   Up in chair  Bed/Chair-wheels locked  Call light within reach  RN notified  Family at bedside     Compliance with Program/Exercises: Compliant all of the time, Will assess as treatment progresses. Recommendations/Intent for next treatment session:  Treatment next visit will focus on increasing Ms. Nails's independence with bed mobility, transfers, self care, functional mobility, modalities for pain, and patient education.       Total Treatment Duration:  OT Patient Time In/Time Out  Time In: 1240  Time Out: [facility] Nanette Garcia

## 2019-08-02 VITALS
DIASTOLIC BLOOD PRESSURE: 67 MMHG | SYSTOLIC BLOOD PRESSURE: 111 MMHG | TEMPERATURE: 97.6 F | BODY MASS INDEX: 24.31 KG/M2 | OXYGEN SATURATION: 95 % | HEIGHT: 63 IN | RESPIRATION RATE: 16 BRPM | HEART RATE: 60 BPM | WEIGHT: 137.2 LBS

## 2019-08-02 LAB
HGB BLD-MCNC: 10.3 G/DL (ref 11.7–15.4)
MM INDURATION POC: 0 MM (ref 0–5)
PPD POC: NEGATIVE NEGATIVE

## 2019-08-02 PROCEDURE — 36415 COLL VENOUS BLD VENIPUNCTURE: CPT

## 2019-08-02 PROCEDURE — 74011250636 HC RX REV CODE- 250/636: Performed by: PHYSICIAN ASSISTANT

## 2019-08-02 PROCEDURE — 74011250637 HC RX REV CODE- 250/637: Performed by: ORTHOPAEDIC SURGERY

## 2019-08-02 PROCEDURE — 74011250637 HC RX REV CODE- 250/637: Performed by: PHYSICIAN ASSISTANT

## 2019-08-02 PROCEDURE — 97116 GAIT TRAINING THERAPY: CPT

## 2019-08-02 PROCEDURE — 97150 GROUP THERAPEUTIC PROCEDURES: CPT

## 2019-08-02 PROCEDURE — 97535 SELF CARE MNGMENT TRAINING: CPT

## 2019-08-02 PROCEDURE — 85018 HEMOGLOBIN: CPT

## 2019-08-02 PROCEDURE — 97110 THERAPEUTIC EXERCISES: CPT

## 2019-08-02 RX ADMIN — CELECOXIB 200 MG: 200 CAPSULE ORAL at 08:30

## 2019-08-02 RX ADMIN — PANTOPRAZOLE SODIUM 40 MG: 40 TABLET, DELAYED RELEASE ORAL at 06:03

## 2019-08-02 RX ADMIN — Medication 10 ML: at 06:08

## 2019-08-02 RX ADMIN — HYDROCHLOROTHIAZIDE: 25 TABLET ORAL at 08:29

## 2019-08-02 RX ADMIN — OXYCODONE HYDROCHLORIDE 10 MG: 5 TABLET ORAL at 00:39

## 2019-08-02 RX ADMIN — OXYCODONE HYDROCHLORIDE 10 MG: 5 TABLET ORAL at 08:30

## 2019-08-02 RX ADMIN — SENNOSIDES AND DOCUSATE SODIUM 2 TABLET: 8.6; 5 TABLET ORAL at 08:30

## 2019-08-02 RX ADMIN — ASPIRIN 81 MG: 81 TABLET ORAL at 08:30

## 2019-08-02 RX ADMIN — Medication 1 AMPULE: at 08:31

## 2019-08-02 RX ADMIN — ACETAMINOPHEN 1000 MG: 500 TABLET, FILM COATED ORAL at 08:30

## 2019-08-02 RX ADMIN — Medication 2 G: at 00:39

## 2019-08-02 RX ADMIN — DEXAMETHASONE SODIUM PHOSPHATE 10 MG: 10 INJECTION INTRAMUSCULAR; INTRAVENOUS at 13:09

## 2019-08-02 RX ADMIN — ACETAMINOPHEN 1000 MG: 500 TABLET, FILM COATED ORAL at 13:09

## 2019-08-02 RX ADMIN — OXYCODONE HYDROCHLORIDE 10 MG: 5 TABLET ORAL at 06:04

## 2019-08-02 NOTE — DISCHARGE INSTRUCTIONS
[address] [facility]   Patient Discharge Instructions    Luciana Vitale / [id number] : 1945    Admitted 2019 Discharged: 2019     IF YOU HAVE ANY PROBLEMS ONCE YOU ARE AT HOME CALL THE FOLLOWING NUMBERS:   Main office number: [phone]    Take Home Medications     · It is important that you take the medication exactly as they are prescribed. · Keep your medication in the bottles provided by the pharmacist and keep a list of the medication names, dosages, and times to be taken in your wallet. · Do not take other medications without consulting your doctor. What to do at [address] your prehospital diet. If you have excessive nausea or vomitting call your doctor's office     Home Physical Therapy is arranged. Use rolling walker when walking. Patients who have had a joint replacement should not drive until you are seen for your follow up appointment by Dr. Ze Phan. When to Call    - Call if you have a temperature greater then 101  - Unable to keep food down  - Loose control of your bladder or bowel function  - Are unable to bear any weight   - Need a pain medication refill       DISCHARGE SUMMARY from Nurse    The following personal items collected during your admission are returned to you:   Dental Appliance: Dental Appliances: None  Vision: Visual Aid: None  Hearing Aid:    Jewelry:    Clothing: Clothing: Steele Kehr  Other Valuables: Other Valuables: Cell Phone  Valuables sent to safe:      PATIENT INSTRUCTIONS:    After general anesthesia or intravenous sedation, for 24 hours or while taking prescription Narcotics:  · Limit your activities  · Do not drive and operate hazardous machinery  · Do not make important personal or business decisions  · Do  not drink alcoholic beverages  · If you have not urinated within 8 hours after discharge, please contact your surgeon on call.     Report the following to your surgeon:  · Excessive pain, swelling, redness or odor of or around the surgical area  · Temperature over 101  · Nausea and vomiting lasting longer than 4 hours or if unable to take medications  · Any signs of decreased circulation or nerve impairment to extremity: change in color, persistent  numbness, tingling, coldness or increase pain  · Any questions, call office @ [phone]      Keep scheduled follow up appointment. If need to change, call office @ [phone]. *  Please give a list of your current medications to your Primary Care Provider. *  Please update this list whenever your medications are discontinued, doses are      changed, or new medications (including over-the-counter products) are added. *  Please carry medication information at all times in case of emergency situations. Patient Education        Hip Replacement Surgery (Posterior): What to Expect at Home  Your Recovery  Hip replacement surgery replaces the worn parts of your hip joint. When you leave the hospital, you will probably be walking with crutches or a walker. You may be able to climb a few stairs and get in and out of bed and chairs. But you will need someone to help you at home for the next few weeks or until you have more energy and can move around better. If you need more extensive rehab, you may go to a specialized rehab center for more treatment. You will go home with a bandage and stitches, staples, tissue glue, or tape strips. You can remove the bandage when your doctor tells you to. If you have stitches or staples, your doctor will remove them 10 days to 3 weeks after your surgery. Glue or tape strips will fall off on their own over time. You may still have some mild pain, and the area may be swollen for 3 to 4 months after surgery. Your doctor will give you medicine for the pain. You will continue the rehabilitation program (rehab) you started in the hospital. The better you do with your rehab exercises, the sooner you will get your strength and movement back.  Most people are able to return to work 4 weeks to 4 months after surgery. This care sheet gives you a general idea about how long it will take for you to recover. But each person recovers at a different pace. Follow the steps below to get better as quickly as possible. How can you care for yourself at home? Activity    · Your doctor may not want your affected leg to cross the center of your body toward the other leg. If so, your therapist may suggest these ideas:  ? Do not cross your legs. ? Be very careful as you get in or out of bed or a car, so your leg does not cross that imaginary line in the middle of your body.     · Rest when you feel tired. You may take a nap, but do not stay in bed all day.     · Work with your physical therapist to learn the best way to exercise. You will probably have to use crutches or a walker for at least 4 to 6 weeks.     · Your doctor may advise you to stay away from activities that put stress on the joint. This includes sports such as tennis, football, and jogging.     · Try not to sit for too long at one time. You will feel less stiff if you take a short walk about every hour. When you sit, use chairs with arms, and do not sit in low chairs.     · Do not bend over more than 90 degrees (like the angle in a letter \"L\").     · Sleep on your back with your legs slightly apart or on your side with a pillow between your knees for about 6 weeks or as your doctor tells you. Do not sleep on your stomach or affected leg.     · Ask your doctor when you can drive again.     · Most people are able to return to work 4 weeks to 4 months after surgery.     · Ask your doctor when it is okay for you to have sex. Diet    · By the time you leave the hospital, you will probably be eating your normal diet. If your stomach is upset, try bland, low-fat foods like plain rice, broiled chicken, toast, and yogurt.  Your doctor may recommend that you take iron and vitamin supplements.     · Drink plenty of fluids (unless your doctor tells you not to).   · Eat healthy foods, and watch your portion sizes. Try to stay at your ideal weight. Too much weight puts more stress on your new hip joint.     · You may notice that your bowel movements are not regular right after your surgery. This is common. Try to avoid constipation and straining with bowel movements. You may want to take a fiber supplement every day. If you have not had a bowel movement after a couple of days, ask your doctor about taking a mild laxative. Medicines    · Your doctor will tell you if and when you can restart your medicines. He or she will also give you instructions about taking any new medicines.     · If you take blood thinners, such as warfarin (Coumadin), clopidogrel (Plavix), or aspirin, be sure to talk to your doctor. He or she will tell you if and when to start taking those medicines again. Make sure that you understand exactly what your doctor wants you to do.     · Your doctor may give you a blood-thinning medicine to prevent blood clots. If you take a blood thinner, be sure you get instructions about how to take your medicine safely. Blood thinners can cause serious bleeding problems. This medicine could be in pill form or as a shot (injection). If a shot is necessary, your doctor will tell you how to do this.     · Be safe with medicines. Take pain medicines exactly as directed. ? If the doctor gave you a prescription medicine for pain, take it as prescribed. ? If you are not taking a prescription pain medicine, ask your doctor if you can take an over-the-counter medicine.     · If you think your pain medicine is making you sick to your stomach:  ? Take your medicine after meals (unless your doctor has told you not to). ? Ask your doctor for a different pain medicine.     · If your doctor prescribed antibiotics, take them as directed. Do not stop taking them just because you feel better. You need to take the full course of antibiotics.    Incision care    · If your doctor told you how to care for your cut (incision), follow your doctor's instructions. You will have a dressing over the cut. A dressing helps the incision heal and protects it. Your doctor will tell you how to take care of this.     · If you did not get instructions, follow this general advice:  ? If you have strips of tape on the cut the doctor made, leave the tape on for a week or until it falls off.  ? If you have stitches or staples, your doctor will tell you when to come back to have them removed. ? If you have skin adhesive on the cut, leave it on until it falls off. Skin adhesive is also called glue or liquid stitches. ? Change the bandage every day. ? Wash the area daily with warm water, and pat it dry. Don't use hydrogen peroxide or alcohol. They can slow healing. ? You may cover the area with a gauze bandage if it oozes fluid or rubs against clothing. ? You may shower 24 to 48 hours after surgery. Pat the incision dry. Don't swim or take a bath for the first 2 weeks, or until your doctor tells you it is okay. Exercise    · Your rehab program will include a number of exercises to do. Always do them as your therapist tells you. Ice and elevation    · For pain, put ice or a cold pack on the area for 10 to 20 minutes at a time. Put a thin cloth between the ice and your skin.     · Your ankle may swell for about 3 months. Prop up your ankle when you ice it or anytime you sit or lie down. Try to keep it above the level of your heart. This will help reduce swelling. Other instructions   Continue to wear your support stockings as your doctor says. These help to prevent blood clots. The length of time that you will have to wear them depends on your activity level and the amount of swelling you have. Most people wear these stockings for 4 to 6 weeks after surgery.   Preventing falls is also very important.  To prevent falls:    · Arrange furniture so that you will not trip on it.     · Get rid of throw rugs, and move electrical cords out of the way.     · Walk only in areas with plenty of light.     · Put grab bars in showers and bathtubs.     · Avoid icy or snowy sidewalks.     · Wear shoes with sturdy, flat soles. Follow-up care is a key part of your treatment and safety. Be sure to make and go to all appointments, and call your doctor if you are having problems. It's also a good idea to know your test results and keep a list of the medicines you take. When should you call for help? Call 911 anytime you think you may need emergency care. For example, call if:    · You passed out (lost consciousness).     · You have severe trouble breathing.     · You have sudden chest pain and shortness of breath, or you cough up blood.    Call your doctor now or seek immediate medical care if:    · You have signs that your hip may be dislocated, including:  ? Severe pain and not being able to stand. ? A crooked leg that looks like your hip is out of position. ? Not being able to bend or straighten your leg.     · Your leg or foot is cool or pale or changes color.     · You cannot feel or move your leg.     · You have signs of a blood clot, such as:  ? Pain in your calf, back of the knee, thigh, or groin. ? Redness and swelling in your leg or groin.     · Your incision comes open and begins to bleed, or the bleeding increases.     · You feel like your heart is racing or beating irregularly.     · You have signs of infection, such as:  ? Increased pain, swelling, warmth, or redness. ? Red streaks leading from the incision. ? Pus draining from the incision. ? A fever.    Watch closely for changes in your health, and be sure to contact your doctor if:    · You do not have a bowel movement after taking a laxative.     · You do not get better as expected. Where can you learn more? Go to http://josé-namita.info/.   Enter O391 in the search box to learn more about \"Hip Replacement Surgery (Posterior): What to Expect at Home. \"  Current as of: September 20, 2018  Content Version: 12.1  © 0865-3741 [employer]. Care instructions adapted under license by [employer] (which disclaims liability or warranty for this information). If you have questions about a medical condition or this instruction, always ask your healthcare professional. [address] any warranty or liability for your use of this information. These are general instructions for a healthy lifestyle:    No smoking/ No tobacco products/ Avoid exposure to second hand smoke    Surgeon General's Warning:  Quitting smoking now greatly reduces serious risk to your health. Obesity, smoking, and sedentary lifestyle greatly increases your risk for illness    A healthy diet, regular physical exercise & weight monitoring are important for maintaining a healthy lifestyle    You may be retaining fluid if you have a history of heart failure or if you experience any of the following symptoms:  Weight gain of 3 pounds or more overnight or 5 pounds in a week, increased swelling in our hands or feet or shortness of breath while lying flat in bed. Please call your doctor as soon as you notice any of these symptoms; do not wait until your next office visit. Recognize signs and symptoms of STROKE:    F-face looks uneven    A-arms unable to move or move even    S-speech slurred or non-existent    T-time-call 911 as soon as signs and symptoms begin-DO NOT go       Back to bed or wait to see if you get better-TIME IS BRAIN. The discharge information has been reviewed with the patient. The patient verbalized understanding. Information obtained by :  I understand that if any problems occur once I am at home I am to contact my physician. I understand and acknowledge receipt of the instructions indicated above. Physician's or R.N.'s Signature                                                                  Date/Time                                                                                                                                              Patient or Representative Signature                                                          Date/Time

## 2019-08-02 NOTE — PROGRESS NOTES
Problem: Mobility Impaired (Adult and Pediatric)  Goal: *Acute Goals and Plan of Care (Insert Text)  Description  GOALS (1-4 days):  (1.)Ms. Taylor Beck will move from supine to sit and sit to supine  in bed with SUPERVISION. (2.)Ms. Taylor Beck will transfer from bed to chair and chair to bed with SUPERVISION using the least restrictive device. (3.)Ms. Talyor Beck will ambulate with SUPERVISION for 250 feet with the least restrictive device. (4.)Ms. Taylor Beck will ambulate up/down 2 steps with appropriate railing with MINIMAL ASSIST with no device. (5.)Ms. Taylor Beck will state/observe MCKENZIE precautions with 0 verbal cues. ________________________________________________________________________________________________     Outcome: Progressing Towards Goal     PHYSICAL THERAPY JOINT CAMP MCKENZIE: Daily Note and AM 8/2/2019  INPATIENT: Hospital Day: 2  Payor: Yinka Burton / Plan: [address] [place] [address] HMO / Product Type: [employer] Care Medicare /      NAME/AGE/GENDER: Yamilex Ross is a 75 y.o. female   PRIMARY DIAGNOSIS:  Unilateral primary osteoarthritis, right hip [M16.11]   Procedure(s) and Anesthesia Type:     * RIGHT TOTAL HIP ARTHOPLASTY  - Spinal (Right)  ICD-10: Treatment Diagnosis:    · Pain in Right Hip (M25.551)  · Stiffness of Right Hip, Not elsewhere classified (M25.651)  · Difficulty in walking, Not elsewhere classified (R26.2)      ASSESSMENT:     Ms. Taylor Beck presents s/p R MCKENZIE. Patient demonstrates decreased R LE strength and ROM and decreased independence with mobility. Patient is independent at baseline and would benefit from therapy to facilitate a return to prior level. Patient will return home at d/c. She lives alone but will have family/friends to assist her as needed. 8/2/19 AM:  Pt is supine in bed on arrival. She is agreeable to PT and plans to go home today with HHPT. She is doing well with mobility and exercises.      This section established at most recent assessment   PROBLEM LIST (Impairments causing functional limitations):  1. Decreased Strength  2. Decreased ADL/Functional Activities  3. Decreased Transfer Abilities  4. Decreased Ambulation Ability/Technique  5. Decreased Balance  6. Increased Pain  7. Decreased Flexibility/Joint Mobility  8. Decreased Knowledge of Precautions  9. Decreased Ogle with Home Exercise Program   INTERVENTIONS PLANNED: (Benefits and precautions of physical therapy have been discussed with the patient.)  1. bed mobility  2. gait training  3. home exercise program (HEP)  4. Range of Motion: active/assisted/passive  5. Therapeutic Activities  6. therapeutic exercise/strengthening  7. transfer training  8. Group Therapy     TREATMENT PLAN: Frequency/Duration: Follow patient BID for duration of hospital stay to address above goals. Rehabilitation Potential For Stated Goals: Good     RECOMMENDED REHABILITATION/EQUIPMENT: (at time of discharge pending progress): Continue Skilled Therapy. HISTORY:   History of Present Injury/Illness (Reason for Referral):  R MCKENZIE  Past Medical History/Comorbidities:   Ms. Chrissy Arce  has a past medical history of Aortic atherosclerosis ([address] Arthritis, Ascending aortic aneurysm ([address] Chronic airway obstruction, not elsewhere classified, GERD (gastroesophageal reflux disease), Hiatal hernia, Hyperlipemia, Hypertension, Osteopenia, and Vitamin D deficiency. Ms. Chrissy Arce  has a past surgical history that includes hx carpal tunnel release (Bilateral, 2004, 2005); hx tonsillectomy; hx cataract removal (07/10/2019); and hx tubal ligation.   Social History/Living Environment:   Home Environment: Private residence  # Steps to Enter: 2  One/Two Story Residence: Two story, live on 1st floor  # of Interior Steps: 14  Living Alone: Yes  Support Systems: Family member(s), Friends \ neighbors  Patient Expects to be Discharged to[employer] residence  Current DME Used/Available at [facility]: Commode, bedside, Shower chair, Walker, rolling  Tub or Shower Type: Tub/Shower combination  Prior Level of Function/Work/Activity:  Independent   Number of Personal Factors/Comorbidities that affect the Plan of Care: 0: LOW COMPLEXITY   EXAMINATION:   Most Recent Physical Functioning:                            Bed Mobility  Supine to Sit: Stand-by assistance    Transfers  Sit to Stand: Stand-by assistance  Stand to Sit: Stand-by assistance  Bed to Chair: Stand-by assistance                   Weight Bearing Status  Right Side Weight Bearing: As tolerated  Distance (ft): 120 Feet (ft)  Ambulation - Level of Assistance: Contact guard assistance  Assistive Device: Walker, rolling  Speed/Prabha: Delayed;Pace decreased (<100 feet/min)  Step Length: Left shortened;Right shortened  Stance: Right decreased  Gait Abnormalities: Antalgic;Decreased step clearance  Interventions: Safety awareness training;Verbal cues     Braces/Orthotics: none    Right Hip Cold  Type: Cold/ice packs      Body Structures Involved:  1. Joints  2. Muscles Body Functions Affected:  1. Movement Related Activities and Participation Affected:  1. Mobility   Number of elements that affect the Plan of Care: 4+: HIGH COMPLEXITY   CLINICAL PRESENTATION:   Presentation: Stable and uncomplicated: LOW COMPLEXITY   CLINICAL DECISION MAKIN [facility] [id number] -PAC 6 Clicks   Basic Mobility Inpatient Short Form  How much difficulty does the patient currently have. .. Unable A Lot A Little None   1. Turning over in bed (including adjusting bedclothes, sheets and blankets)? ? 1   ? 2   ? 3   ? 4   2. Sitting down on and standing up from a chair with arms ( e.g., wheelchair, bedside commode, etc.)   ? 1   ? 2   ? 3   ? 4   3. Moving from lying on back to sitting on the side of the bed?   ? 1   ? 2   ? 3   ? 4   How much help from another person does the patient currently need. .. Total A Lot A Little None   4. Moving to and from a bed to a chair (including a wheelchair)? ? 1   ? 2   ? 3   ? 4   5.   Need to walk in hospital room? ? 1   ? 2   ? 3   ? 4   6. Climbing 3-5 steps with a railing? ? 1   ? 2   ? 3   ? 4   © 2007, Trustees of [address] [place] [address] [address] [address], under license to [employer]. All rights reserved     Score:  Initial: 18 Most Recent: X (Date: -- )    Interpretation of Tool:  Represents activities that are increasingly more difficult (i.e. Bed mobility, Transfers, Gait). Medical Necessity:     · Patient is expected to demonstrate progress in strength, range of motion, balance and coordination  ·  to increase independence with mobility and HEP. · .  Reason for Services/Other Comments:  · Patient continues to require skilled intervention due to decreased strength and coordination and decreased independence with mobility s/p MCKENZIE. · .   Use of outcome tool(s) and clinical judgement create a POC that gives a: Clear prediction of patient's progress: LOW COMPLEXITY            TREATMENT:   (In addition to Assessment/Re-Assessment sessions the following treatments were rendered)     Pre-treatment Symptoms/Complaints:  Minimal complaints of pain. Pain: Initial:      Post Session:  Not rated     Therapeutic Exercise: (15 Minutes):  Exercises per grid below to improve mobility and strength. Required minimal verbal cues to promote proper body alignment. Progressed repetitions as indicated. Gait Training (10 Minutes):  Gait training to improve and/or restore physical functioning as related to mobility, strength and balance. Ambulated 120 Feet (ft) with Contact guard assistance using a Walker, rolling and minimal Safety awareness training;Verbal cues related to their stance phase and stride length to promote proper body alignment, promote proper body posture and promote proper body mechanics. Date:  8/1/19 Date:  8/2/19 Date:     ACTIVITY/EXERCISE AM PM AM PM AM PM   GROUP THERAPY  ?  ?  ?  ?  ?  ?    Ankle Pumps  10 10      Quad Sets  10 10      Gluteal Sets  10 10      Hip ABd/ADduction  10 10 Straight Leg Raises         Knee Slides  10 10      Short Arc Quads  10       Long Arc Quads   10      Chair Slides                  B = bilateral; AA = active assistive; A = active; P = passive      Treatment/Session Assessment:     Response to Treatment:  Pt continues to do well. Education:  ? Home Exercises  ? Fall Precautions  ? Hip Precautions ? D/C Instruction Review  ? Knee/Hip Prosthesis Review  ? Walker Management/Safety ? Adaptive Equipment as Needed       Interdisciplinary Collaboration:   o Physical Therapy Assistant  o Registered Nurse    After treatment position/precautions:   o Up in chair  o Bed/Chair-wheels locked  o Call light within reach  o Family at bedside    Compliance with Program/Exercises: Will assess as treatment progresses. Recommendations/Intent for next treatment session:  Treatment next visit will focus on increasing Ms. Nails's independence with bed mobility, transfers, gait training, strength/ROM exercises, modalities for pain, and patient education.       Total Treatment Duration:  PT Patient Time In/Time Out  Time In: 1010  Time Out: [address] [place], [facility]

## 2019-08-02 NOTE — PROGRESS NOTES
Problem: Self Care Deficits Care Plan (Adult)  Goal: *Acute Goals and Plan of Care (Insert Text)  Description  GOALS:   DISCHARGE GOALS (in preparation for going home/rehab):  3 days  1. Ms. Jacobo Guerrero will perform one lower body dressing activity with minimal assistance with adaptive equipment to demonstrate improved functional mobility and safety. 2.  Ms. Jacobo Guerrero will perform one lower body bathing activity with minimal  assistance with adaptive equipment to demonstrate improved functional mobility and safety. 3.  Ms. Jacobo Guerrero will perform toileting/toilet transfer with contact guard assistance with adaptive equipment to demonstrate improved functional mobility and safety. 4.  Ms. Jacobo Guerrero will perform shower transfer with contact guard assistance with adaptive equipment to demonstrate improved functional mobility and safety. 5.  Ms. Jacobo Guerrero will state MCKENZIE precautions with two verbal cues to demonstrate improved functional mobility and safety. Outcome: MET       JOINT CAMP OCCUPATIONAL THERAPY MCKENZIE: Daily Note, Discharge, Treatment Day: 1st and AM 8/2/2019  INPATIENT: Hospital Day: 2  Payor: [facility] / Plan: [address] [place] [address] HMO / Product Type: [employer] /      NAME/AGE/GENDER: Fawn Castorena is a 75 y.o. female   PRIMARY DIAGNOSIS:  Unilateral primary osteoarthritis, right hip [M16.11]   Procedure(s) and Anesthesia Type:     * RIGHT TOTAL HIP ARTHOPLASTY  - Spinal (Right)  ICD-10: Treatment Diagnosis:    · Pain in Right Hip (M25.551)  · Stiffness of Right Hip, Not elsewhere classified (M25.651)      ASSESSMENT:     Ms. Jacobo Guerrero is s/p right MCKENZIE and presents with decreased weight bearing on right LE and decreased independence with functional mobility and activities of daily living. Patient completed shower and dressing as charted below in ADL grid and is ambulating with rolling walker and stand by assist.  Patient has met 5/5 goals and plans to return home with good family support.   Family able to provide patient with appropriate level of assistance at this time. OT reviewed hip precautions throughout session. Patient instructed to call for assistance when needing to get up from recliner and all needs in reach. Patient verbalized understanding of call light. This section established at most recent assessment   PROBLEM LIST (Impairments causing functional limitations):  1. Decreased Strength  2. Decreased ADL/Functional Activities  3. Decreased Transfer Abilities  4. Increased Pain  5. Increased Fatigue  6. Decreased Flexibility/Joint Mobility  7. Decreased Knowledge of Precautions   INTERVENTIONS PLANNED: (Benefits and precautions of occupational therapy have been discussed with the patient.)  1. Activities of daily living training  2. Adaptive equipment training  3. Balance training  4. Clothing management  5. Donning&doffing training  6. Theraputic activity     TREATMENT PLAN: Frequency/Duration: Follow patient 1-2 times to address above goals. Rehabilitation Potential For Stated Goals: Good     RECOMMENDED REHABILITATION/EQUIPMENT: (at time of discharge pending progress): Continue Skilled Therapy and Home Health: Physical Therapy. OCCUPATIONAL PROFILE AND HISTORY:   History of Present Injury/Illness (Reason for Referral): Pt presents this date s/p (right) MCKENZIE. Past Medical History/Comorbidities:   Ms. Nicho Avila  has a past medical history of Aortic atherosclerosis ([address] Arthritis, Ascending aortic aneurysm ([address] Chronic airway obstruction, not elsewhere classified, GERD (gastroesophageal reflux disease), Hiatal hernia, Hyperlipemia, Hypertension, Osteopenia, and Vitamin D deficiency. Ms. Nicho Avila  has a past surgical history that includes hx carpal tunnel release (Bilateral, 2004, 2005); hx tonsillectomy; hx cataract removal (07/10/2019); and hx tubal ligation.   Social History/Living Environment:   Home Environment: Private residence  # Steps to Enter: 2  One/Two Story Residence: Two story, live on 1st floor  # of Interior Steps: 14  Living Alone: Yes  Support Systems: Family member(s), Friends \ neighbors  Patient Expects to be Discharged to[de-identified] Private residence  Current DME Used/Available at Home: Commode, bedside, Shower chair, Walker, rolling  Tub or Shower Type: Tub/Shower combination  Prior Level of Function/Work/Activity:  Independent      Number of Personal Factors/Comorbidities that affect the Plan of Care: Brief history (0):  LOW COMPLEXITY   ASSESSMENT OF OCCUPATIONAL PERFORMANCE[de-identified]   Most Recent Physical Functioning:   Balance  Sitting: Intact  Standing: Intact; With support                              Mental Status  Neurologic State: Alert  Orientation Level: Oriented X4  Cognition: Appropriate decision making; Appropriate for age attention/concentration; Appropriate safety awareness                Basic ADLs (From Assessment) Complex ADLs (From Assessment)   Basic ADL  Feeding: Independent  Oral Facial Hygiene/Grooming: Supervision  Bathing: Moderate assistance  Type of Bath: Chlorhexidine (CHG), Full, Shower  Upper Body Dressing: Supervision  Lower Body Dressing: Moderate assistance  Toileting: Minimum assistance     Grooming/Bathing/Dressing Activities of Daily Living   Grooming  Grooming Assistance: Supervision(standing at sink)     Upper Body Bathing  Bathing Assistance: Supervision(seated on shower chair)     Lower Body Bathing  Bathing Assistance: Supervision(seated on shower chair with verbal cues) Toileting  Toileting Assistance: Stand-by assistance  Cues: Verbal cues provided  Adaptive Equipment: Elevated seat;Grab bars; Walker   Upper Body Dressing Assistance  Dressing Assistance: Supervision Functional Transfers  Bathroom Mobility: Stand-by assistance  Toilet Transfer : Stand-by assistance  Shower Transfer: Stand-by assistance  Cues: Verbal cues provided  Adaptive Equipment: Shower chair with back;Grab bars; Walker (comment)   Lower Body Dressing Assistance  Dressing Assistance: Stand-by assistance  Position Performed: Seated in chair;Standing  Cues: Verbal cues provided  Adaptive Equipment Used: Grab bar;Walker Bed/Mat Mobility  Supine to Sit: Stand-by assistance  Sit to Stand: Stand-by assistance  Stand to Sit: Stand-by assistance  Bed to Chair: Stand-by assistance         Physical Skills Involved:  1. Range of Motion  2. Balance  3. Strength Cognitive Skills Affected (resulting in the inability to perform in a timely and safe manner): 1. none  Psychosocial Skills Affected:  1. Environmental Adaptation   Number of elements that affect the Plan of Care: 3-5:  MODERATE COMPLEXITY   CLINICAL DECISION MAKIN[address] [place] [address] AM-PAC 6 Clicks   Daily Activity Inpatient Short Form  How much help from another person does the patient currently need. .. Total A Lot A Little None   1. Putting on and taking off regular lower body clothing? ? 1   ? 2   ? 3   ? 4   2. Bathing (including washing, rinsing, drying)? ? 1   ? 2   ? 3   ? 4   3. Toileting, which includes using toilet, bedpan or urinal?   ? 1   ? 2   ? 3   ? 4   4. Putting on and taking off regular upper body clothing? ? 1   ? 2   ? 3   ? 4   5. Taking care of personal grooming such as brushing teeth? ? 1   ? 2   ? 3   ? 4   6. Eating meals? ? 1   ? 2   ? 3   ? 4   © , Trustees of [address] [place] [address], under license to [employer]. All rights reserved     Score:  Initial: 20 Most Recent: 24 (Date: 2019)    Interpretation of Tool:  Represents activities that are increasingly more difficult (i.e. Bed mobility, Transfers, Gait).     Medical Necessity:     · Patient is expected to demonstrate progress in range of motion, balance, coordination and functional technique  ·  to increase independence with self care   · .  Reason for Services/Other Comments:  · Patient would benefit from skilled Occupational Therapy to maximize independence and safety with self-care task and functional mobility. Use of outcome tool(s) and clinical judgement create a POC that gives a: LOW COMPLEXITY            TREATMENT:   (In addition to Assessment/Re-Assessment sessions the following treatments were rendered)     Pre-treatment Symptoms/Complaints:  no complaint of pain, noted \"pulling\"  Pain: Initial:      Post Session:  0     Self Care: (45 min): Procedure(s) (per grid) utilized to improve and/or restore self-care/home management as related to dressing, bathing, toileting and grooming. Required moderate verbal and   cueing to facilitate activities of daily living skills and compensatory activities. Treatment/Session Assessment:     Response to Treatment:  pt up to shower tolerated well    Education:  ? Home Exercises  ? Fall Precautions  ? Hip Precautions ? Going Home Video  ? Knee/Hip Prosthesis Review  ? Walker Management/Safety ? Adaptive Equipment as Needed       Interdisciplinary Collaboration:   o Physical Therapist  o Occupational Therapist  o Registered Nurse    After treatment position/precautions:   o Up in chair  o Bed/Chair-wheels locked  o Call light within reach  o RN notified     Compliance with Program/Exercises: Compliant all of the time. Recommendations/Intent for next treatment session:  Pt doing well all goals met and will do well at home with support from family. Patient will be discharged home with home health PT. No further Occupational Therapy warranted, will discharge Occupational Therapy services.       Total Treatment Duration:  OT Patient Time In/Time Out  Time In: 1035  Time Out: [address] [facility]

## 2019-08-02 NOTE — PROGRESS NOTES
2019         Post Op day: 1 Day Post-Op   Admit Diagnosis: Unilateral primary osteoarthritis, right hip [M16.11]  Arthritis of right hip [M16.11]  Arthritis of right hip [M16.11]  LAB:    Recent Results (from the past 24 hour(s))   HEMOGLOBIN    Collection Time: 19  7:13 PM   Result Value Ref Range    HGB 12.2 11.7 - 15.4 g/dL   HEMOGLOBIN    Collection Time: 19  4:05 AM   Result Value Ref Range    HGB 10.3 (L) 11.7 - 15.4 g/dL     Vital Signs:    Patient Vitals for the past 8 hrs:   BP Temp Pulse Resp SpO2   19 0247 95/59 97.8 °F (36.6 °C) 62 16 90 %   19 0027 114/75 97.8 °F (36.6 °C) 65 16 92 %     Temp (24hrs), Av.7 °F (36.5 °C), Min:97.6 °F (36.4 °C), Max:97.8 °F (36.6 °C)    Pain Control:   Pain Assessment  Pain Scale 1: Numeric (0 - 10)  Pain Intensity 1: 6  Pain Onset 1: at rest  Pain Location 1: Hip  Pain Orientation 1: Right  Pain Description 1: Aching  Pain Intervention(s) 1: Medication (see MAR)  Subjective: Doing well, pain is well controlled, no complaints     Objective:  No Acute Distress, Alert and Oriented, right hip dressing is C/D/I. 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 ASA/SCDs for DVT prophylaxis. D/c to home today.    Signed By: LILLIANA Casarez

## 2019-08-03 ENCOUNTER — HOME CARE VISIT (OUTPATIENT)
Dept: SCHEDULING | Facility: HOME HEALTH | Age: 74
End: 2019-08-03
Payer: MEDICARE

## 2019-08-03 VITALS
SYSTOLIC BLOOD PRESSURE: 133 MMHG | RESPIRATION RATE: 17 BRPM | TEMPERATURE: 98.1 F | HEART RATE: 63 BPM | DIASTOLIC BLOOD PRESSURE: 76 MMHG

## 2019-08-03 PROCEDURE — 3331090002 HH PPS REVENUE DEBIT

## 2019-08-03 PROCEDURE — 3331090001 HH PPS REVENUE CREDIT

## 2019-08-03 PROCEDURE — G0151 HHCP-SERV OF PT,EA 15 MIN: HCPCS

## 2019-08-03 PROCEDURE — 400013 HH SOC

## 2019-08-04 PROCEDURE — 3331090001 HH PPS REVENUE CREDIT

## 2019-08-04 PROCEDURE — 3331090002 HH PPS REVENUE DEBIT

## 2019-08-05 ENCOUNTER — HOME CARE VISIT (OUTPATIENT)
Dept: SCHEDULING | Facility: HOME HEALTH | Age: 74
End: 2019-08-05
Payer: MEDICARE

## 2019-08-05 VITALS
HEART RATE: 67 BPM | SYSTOLIC BLOOD PRESSURE: 120 MMHG | RESPIRATION RATE: 16 BRPM | TEMPERATURE: 97.9 F | DIASTOLIC BLOOD PRESSURE: 80 MMHG

## 2019-08-05 PROCEDURE — G0151 HHCP-SERV OF PT,EA 15 MIN: HCPCS

## 2019-08-05 PROCEDURE — 3331090001 HH PPS REVENUE CREDIT

## 2019-08-05 PROCEDURE — 3331090002 HH PPS REVENUE DEBIT

## 2019-08-06 PROCEDURE — 3331090002 HH PPS REVENUE DEBIT

## 2019-08-06 PROCEDURE — 3331090001 HH PPS REVENUE CREDIT

## 2019-08-07 ENCOUNTER — HOME CARE VISIT (OUTPATIENT)
Dept: SCHEDULING | Facility: HOME HEALTH | Age: 74
End: 2019-08-07
Payer: MEDICARE

## 2019-08-07 VITALS
SYSTOLIC BLOOD PRESSURE: 142 MMHG | TEMPERATURE: 97.8 F | DIASTOLIC BLOOD PRESSURE: 80 MMHG | RESPIRATION RATE: 16 BRPM | HEART RATE: 64 BPM

## 2019-08-07 PROCEDURE — 3331090001 HH PPS REVENUE CREDIT

## 2019-08-07 PROCEDURE — G0157 HHC PT ASSISTANT EA 15: HCPCS

## 2019-08-07 PROCEDURE — 3331090002 HH PPS REVENUE DEBIT

## 2019-08-08 PROCEDURE — 3331090001 HH PPS REVENUE CREDIT

## 2019-08-08 PROCEDURE — 3331090002 HH PPS REVENUE DEBIT

## 2019-08-09 ENCOUNTER — HOME CARE VISIT (OUTPATIENT)
Dept: SCHEDULING | Facility: HOME HEALTH | Age: 74
End: 2019-08-09
Payer: MEDICARE

## 2019-08-09 PROCEDURE — G0157 HHC PT ASSISTANT EA 15: HCPCS

## 2019-08-09 PROCEDURE — 3331090002 HH PPS REVENUE DEBIT

## 2019-08-09 PROCEDURE — 3331090001 HH PPS REVENUE CREDIT

## 2019-08-10 PROCEDURE — 3331090002 HH PPS REVENUE DEBIT

## 2019-08-10 PROCEDURE — 3331090001 HH PPS REVENUE CREDIT

## 2019-08-11 VITALS
RESPIRATION RATE: 16 BRPM | TEMPERATURE: 98.3 F | DIASTOLIC BLOOD PRESSURE: 90 MMHG | SYSTOLIC BLOOD PRESSURE: 140 MMHG | HEART RATE: 73 BPM

## 2019-08-11 PROCEDURE — 3331090002 HH PPS REVENUE DEBIT

## 2019-08-11 PROCEDURE — 3331090001 HH PPS REVENUE CREDIT

## 2019-08-12 ENCOUNTER — HOME CARE VISIT (OUTPATIENT)
Dept: SCHEDULING | Facility: HOME HEALTH | Age: 74
End: 2019-08-12
Payer: MEDICARE

## 2019-08-12 VITALS
HEART RATE: 72 BPM | RESPIRATION RATE: 17 BRPM | DIASTOLIC BLOOD PRESSURE: 70 MMHG | TEMPERATURE: 98.2 F | SYSTOLIC BLOOD PRESSURE: 130 MMHG

## 2019-08-12 PROCEDURE — G0157 HHC PT ASSISTANT EA 15: HCPCS

## 2019-08-12 PROCEDURE — 3331090001 HH PPS REVENUE CREDIT

## 2019-08-12 PROCEDURE — 3331090002 HH PPS REVENUE DEBIT

## 2019-08-13 PROCEDURE — 3331090001 HH PPS REVENUE CREDIT

## 2019-08-13 PROCEDURE — 3331090002 HH PPS REVENUE DEBIT

## 2019-08-14 PROCEDURE — 3331090002 HH PPS REVENUE DEBIT

## 2019-08-14 PROCEDURE — 3331090001 HH PPS REVENUE CREDIT

## 2019-08-15 ENCOUNTER — HOME CARE VISIT (OUTPATIENT)
Dept: SCHEDULING | Facility: HOME HEALTH | Age: 74
End: 2019-08-15
Payer: MEDICARE

## 2019-08-15 ENCOUNTER — HOME CARE VISIT (OUTPATIENT)
Dept: HOME HEALTH SERVICES | Facility: HOME HEALTH | Age: 74
End: 2019-08-15
Payer: MEDICARE

## 2019-08-15 VITALS
HEART RATE: 66 BPM | TEMPERATURE: 97.9 F | SYSTOLIC BLOOD PRESSURE: 118 MMHG | DIASTOLIC BLOOD PRESSURE: 76 MMHG | RESPIRATION RATE: 17 BRPM

## 2019-08-15 PROCEDURE — A4649 SURGICAL SUPPLIES: HCPCS

## 2019-08-15 PROCEDURE — G0157 HHC PT ASSISTANT EA 15: HCPCS

## 2019-08-15 PROCEDURE — 3331090001 HH PPS REVENUE CREDIT

## 2019-08-15 PROCEDURE — 3331090002 HH PPS REVENUE DEBIT

## 2019-08-16 PROCEDURE — 3331090001 HH PPS REVENUE CREDIT

## 2019-08-16 PROCEDURE — 3331090002 HH PPS REVENUE DEBIT

## 2019-08-17 PROCEDURE — 3331090001 HH PPS REVENUE CREDIT

## 2019-08-17 PROCEDURE — 3331090002 HH PPS REVENUE DEBIT

## 2019-08-18 PROCEDURE — 3331090002 HH PPS REVENUE DEBIT

## 2019-08-18 PROCEDURE — 3331090001 HH PPS REVENUE CREDIT

## 2019-08-19 ENCOUNTER — HOME CARE VISIT (OUTPATIENT)
Dept: SCHEDULING | Facility: HOME HEALTH | Age: 74
End: 2019-08-19
Payer: MEDICARE

## 2019-08-19 VITALS
SYSTOLIC BLOOD PRESSURE: 122 MMHG | RESPIRATION RATE: 17 BRPM | HEART RATE: 62 BPM | DIASTOLIC BLOOD PRESSURE: 6 MMHG | TEMPERATURE: 98 F

## 2019-08-19 PROCEDURE — 3331090002 HH PPS REVENUE DEBIT

## 2019-08-19 PROCEDURE — 3331090001 HH PPS REVENUE CREDIT

## 2019-08-19 PROCEDURE — G0157 HHC PT ASSISTANT EA 15: HCPCS

## 2019-08-20 PROCEDURE — 3331090002 HH PPS REVENUE DEBIT

## 2019-08-20 PROCEDURE — 3331090001 HH PPS REVENUE CREDIT

## 2019-08-21 PROCEDURE — 3331090002 HH PPS REVENUE DEBIT

## 2019-08-21 PROCEDURE — 3331090001 HH PPS REVENUE CREDIT

## 2019-08-22 ENCOUNTER — HOME CARE VISIT (OUTPATIENT)
Dept: SCHEDULING | Facility: HOME HEALTH | Age: 74
End: 2019-08-22
Payer: MEDICARE

## 2019-08-22 ENCOUNTER — HOME CARE VISIT (OUTPATIENT)
Dept: HOME HEALTH SERVICES | Facility: HOME HEALTH | Age: 74
End: 2019-08-22
Payer: MEDICARE

## 2019-08-22 VITALS
SYSTOLIC BLOOD PRESSURE: 120 MMHG | TEMPERATURE: 97.5 F | DIASTOLIC BLOOD PRESSURE: 79 MMHG | HEART RATE: 62 BPM | RESPIRATION RATE: 16 BRPM

## 2019-08-22 PROCEDURE — 3331090001 HH PPS REVENUE CREDIT

## 2019-08-22 PROCEDURE — 3331090002 HH PPS REVENUE DEBIT

## 2019-08-22 PROCEDURE — G0151 HHCP-SERV OF PT,EA 15 MIN: HCPCS

## 2019-12-21 ENCOUNTER — HOSPITAL ENCOUNTER (INPATIENT)
Age: 74
LOS: 2 days | Discharge: HOME HEALTH | DRG: 066 | End: 2019-12-23
Attending: EMERGENCY MEDICINE | Admitting: INTERNAL MEDICINE
Payer: MEDICARE

## 2019-12-21 ENCOUNTER — APPOINTMENT (OUTPATIENT)
Dept: CT IMAGING | Age: 74
DRG: 066 | End: 2019-12-21
Attending: EMERGENCY MEDICINE
Payer: MEDICARE

## 2019-12-21 DIAGNOSIS — D49.6 BRAIN TUMOR (HCC): ICD-10-CM

## 2019-12-21 DIAGNOSIS — H53.47 HEMIANOPSIA: Primary | ICD-10-CM

## 2019-12-21 DIAGNOSIS — I63.431 CEREBRAL INFARCTION DUE TO EMBOLISM OF RIGHT POSTERIOR CEREBRAL ARTERY (HCC): ICD-10-CM

## 2019-12-21 PROBLEM — G93.89 CEREBRAL MASS: Status: ACTIVE | Noted: 2019-12-21

## 2019-12-21 PROBLEM — G93.89 CEREBELLAR MASS: Status: ACTIVE | Noted: 2019-12-21

## 2019-12-21 PROBLEM — R27.0 ATAXIA: Status: ACTIVE | Noted: 2019-12-21

## 2019-12-21 PROBLEM — H53.462 LEFT HOMONYMOUS HEMIANOPSIA: Status: ACTIVE | Noted: 2019-12-21

## 2019-12-21 LAB
ANION GAP SERPL CALC-SCNC: 5 MMOL/L (ref 7–16)
ATRIAL RATE: 58 BPM
BASOPHILS # BLD: 0 K/UL (ref 0–0.2)
BASOPHILS NFR BLD: 0 % (ref 0–2)
BUN SERPL-MCNC: 9 MG/DL (ref 8–23)
CALCIUM SERPL-MCNC: 9.3 MG/DL (ref 8.3–10.4)
CALCULATED P AXIS, ECG09: 59 DEGREES
CALCULATED R AXIS, ECG10: 66 DEGREES
CALCULATED T AXIS, ECG11: 66 DEGREES
CEA SERPL-MCNC: 9.2 NG/ML (ref 0–3)
CHLORIDE SERPL-SCNC: 100 MMOL/L (ref 98–107)
CO2 SERPL-SCNC: 32 MMOL/L (ref 21–32)
CREAT SERPL-MCNC: 0.7 MG/DL (ref 0.6–1)
DIAGNOSIS, 93000: NORMAL
DIFFERENTIAL METHOD BLD: NORMAL
EOSINOPHIL # BLD: 0.1 K/UL (ref 0–0.8)
EOSINOPHIL NFR BLD: 1 % (ref 0.5–7.8)
ERYTHROCYTE [DISTWIDTH] IN BLOOD BY AUTOMATED COUNT: 12.5 % (ref 11.9–14.6)
GLUCOSE SERPL-MCNC: 99 MG/DL (ref 65–100)
HCT VFR BLD AUTO: 40.9 % (ref 35.8–46.3)
HGB BLD-MCNC: 13.9 G/DL (ref 11.7–15.4)
IMM GRANULOCYTES # BLD AUTO: 0 K/UL (ref 0–0.5)
IMM GRANULOCYTES NFR BLD AUTO: 0 % (ref 0–5)
INR PPP: 0.9
LYMPHOCYTES # BLD: 1.9 K/UL (ref 0.5–4.6)
LYMPHOCYTES NFR BLD: 22 % (ref 13–44)
MCH RBC QN AUTO: 32.6 PG (ref 26.1–32.9)
MCHC RBC AUTO-ENTMCNC: 34 G/DL (ref 31.4–35)
MCV RBC AUTO: 96 FL (ref 79.6–97.8)
MONOCYTES # BLD: 0.8 K/UL (ref 0.1–1.3)
MONOCYTES NFR BLD: 10 % (ref 4–12)
NEUTS SEG # BLD: 5.7 K/UL (ref 1.7–8.2)
NEUTS SEG NFR BLD: 66 % (ref 43–78)
NRBC # BLD: 0 K/UL (ref 0–0.2)
P-R INTERVAL, ECG05: 160 MS
PLATELET # BLD AUTO: 242 K/UL (ref 150–450)
PMV BLD AUTO: 10.6 FL (ref 9.4–12.3)
POTASSIUM SERPL-SCNC: 3.4 MMOL/L (ref 3.5–5.1)
PROTHROMBIN TIME: 12.6 SEC (ref 11.7–14.5)
Q-T INTERVAL, ECG07: 408 MS
QRS DURATION, ECG06: 82 MS
QTC CALCULATION (BEZET), ECG08: 400 MS
RBC # BLD AUTO: 4.26 M/UL (ref 4.05–5.2)
SODIUM SERPL-SCNC: 137 MMOL/L (ref 136–145)
T4 FREE SERPL-MCNC: 0.9 NG/DL (ref 0.78–1.46)
TSH SERPL DL<=0.005 MIU/L-ACNC: 2.2 UIU/ML
VENTRICULAR RATE, ECG03: 58 BPM
WBC # BLD AUTO: 8.7 K/UL (ref 4.3–11.1)

## 2019-12-21 PROCEDURE — 99285 EMERGENCY DEPT VISIT HI MDM: CPT | Performed by: EMERGENCY MEDICINE

## 2019-12-21 PROCEDURE — 74011250636 HC RX REV CODE- 250/636: Performed by: INTERNAL MEDICINE

## 2019-12-21 PROCEDURE — 65270000029 HC RM PRIVATE

## 2019-12-21 PROCEDURE — 74011000302 HC RX REV CODE- 302: Performed by: INTERNAL MEDICINE

## 2019-12-21 PROCEDURE — 74011250637 HC RX REV CODE- 250/637: Performed by: INTERNAL MEDICINE

## 2019-12-21 PROCEDURE — 82308 ASSAY OF CALCITONIN: CPT

## 2019-12-21 PROCEDURE — 86580 TB INTRADERMAL TEST: CPT | Performed by: INTERNAL MEDICINE

## 2019-12-21 PROCEDURE — 80048 BASIC METABOLIC PNL TOTAL CA: CPT

## 2019-12-21 PROCEDURE — 82378 CARCINOEMBRYONIC ANTIGEN: CPT

## 2019-12-21 PROCEDURE — 93005 ELECTROCARDIOGRAM TRACING: CPT | Performed by: EMERGENCY MEDICINE

## 2019-12-21 PROCEDURE — 70450 CT HEAD/BRAIN W/O DYE: CPT

## 2019-12-21 PROCEDURE — 85025 COMPLETE CBC W/AUTO DIFF WBC: CPT

## 2019-12-21 PROCEDURE — 84443 ASSAY THYROID STIM HORMONE: CPT

## 2019-12-21 PROCEDURE — 84439 ASSAY OF FREE THYROXINE: CPT

## 2019-12-21 PROCEDURE — 85610 PROTHROMBIN TIME: CPT

## 2019-12-21 RX ORDER — ONDANSETRON 2 MG/ML
4 INJECTION INTRAMUSCULAR; INTRAVENOUS
Status: DISCONTINUED | OUTPATIENT
Start: 2019-12-21 | End: 2019-12-23 | Stop reason: HOSPADM

## 2019-12-21 RX ORDER — ACETAMINOPHEN 500 MG
500 TABLET ORAL
Status: DISCONTINUED | OUTPATIENT
Start: 2019-12-21 | End: 2019-12-23 | Stop reason: HOSPADM

## 2019-12-21 RX ORDER — DEXAMETHASONE SODIUM PHOSPHATE 4 MG/ML
4 INJECTION, SOLUTION INTRA-ARTICULAR; INTRALESIONAL; INTRAMUSCULAR; INTRAVENOUS; SOFT TISSUE EVERY 8 HOURS
Status: DISCONTINUED | OUTPATIENT
Start: 2019-12-21 | End: 2019-12-22

## 2019-12-21 RX ORDER — LEVETIRACETAM 500 MG/1
500 TABLET ORAL 2 TIMES DAILY
Status: DISCONTINUED | OUTPATIENT
Start: 2019-12-21 | End: 2019-12-22

## 2019-12-21 RX ORDER — SODIUM CHLORIDE 0.9 % (FLUSH) 0.9 %
5-40 SYRINGE (ML) INJECTION EVERY 8 HOURS
Status: DISCONTINUED | OUTPATIENT
Start: 2019-12-21 | End: 2019-12-23 | Stop reason: HOSPADM

## 2019-12-21 RX ORDER — SODIUM CHLORIDE 0.9 % (FLUSH) 0.9 %
5-40 SYRINGE (ML) INJECTION AS NEEDED
Status: DISCONTINUED | OUTPATIENT
Start: 2019-12-21 | End: 2019-12-23 | Stop reason: HOSPADM

## 2019-12-21 RX ORDER — LANOLIN ALCOHOL/MO/W.PET/CERES
400 CREAM (GRAM) TOPICAL
Status: DISCONTINUED | OUTPATIENT
Start: 2019-12-21 | End: 2019-12-23 | Stop reason: HOSPADM

## 2019-12-21 RX ORDER — HYDROCODONE BITARTRATE AND ACETAMINOPHEN 5; 325 MG/1; MG/1
1 TABLET ORAL
Status: DISCONTINUED | OUTPATIENT
Start: 2019-12-21 | End: 2019-12-23

## 2019-12-21 RX ORDER — POLYETHYLENE GLYCOL 3350 17 G/17G
17 POWDER, FOR SOLUTION ORAL DAILY
Status: DISCONTINUED | OUTPATIENT
Start: 2019-12-22 | End: 2019-12-23

## 2019-12-21 RX ORDER — ASPIRIN 81 MG/1
81 TABLET ORAL EVERY 12 HOURS
Status: DISCONTINUED | OUTPATIENT
Start: 2019-12-21 | End: 2019-12-23 | Stop reason: HOSPADM

## 2019-12-21 RX ORDER — HEPARIN SODIUM 5000 [USP'U]/ML
5000 INJECTION, SOLUTION INTRAVENOUS; SUBCUTANEOUS EVERY 12 HOURS
Status: DISCONTINUED | OUTPATIENT
Start: 2019-12-21 | End: 2019-12-23

## 2019-12-21 RX ORDER — PANTOPRAZOLE SODIUM 40 MG/1
40 TABLET, DELAYED RELEASE ORAL
Status: DISCONTINUED | OUTPATIENT
Start: 2019-12-22 | End: 2019-12-23 | Stop reason: HOSPADM

## 2019-12-21 RX ADMIN — HEPARIN SODIUM 5000 UNITS: 5000 INJECTION INTRAVENOUS; SUBCUTANEOUS at 21:31

## 2019-12-21 RX ADMIN — ASPIRIN 81 MG: 81 TABLET, COATED ORAL at 21:31

## 2019-12-21 RX ADMIN — DEXAMETHASONE SODIUM PHOSPHATE 4 MG: 4 INJECTION, SOLUTION INTRAMUSCULAR; INTRAVENOUS at 21:31

## 2019-12-21 RX ADMIN — LOSARTAN POTASSIUM: 50 TABLET, FILM COATED ORAL at 21:31

## 2019-12-21 RX ADMIN — Medication 10 ML: at 21:32

## 2019-12-21 RX ADMIN — LEVETIRACETAM 500 MG: 500 TABLET ORAL at 17:49

## 2019-12-21 RX ADMIN — TUBERCULIN PURIFIED PROTEIN DERIVATIVE 5 UNITS: 5 INJECTION INTRADERMAL at 15:46

## 2019-12-21 RX ADMIN — DEXAMETHASONE SODIUM PHOSPHATE 4 MG: 4 INJECTION, SOLUTION INTRAMUSCULAR; INTRAVENOUS at 15:46

## 2019-12-21 RX ADMIN — Medication 10 ML: at 15:48

## 2019-12-21 RX ADMIN — Medication 400 MG: at 21:31

## 2019-12-21 NOTE — PROGRESS NOTES
Patient ambulates without assistance to restroom and voids adequate amounts of clear yellow urine.  Patient is aware she is NPO after midnight due to scheduled MRI and CT of abdomen in the am.

## 2019-12-21 NOTE — PROGRESS NOTES
12/21/19 1458   Skin Integumentary   Skin Integumentary (WDL) WDL    Pressure  Injury Documentation No Pressure Injury Noted-Pressure Ulcer Prevention Initiated   Wound Prevention and Protection Methods   Orientation of Wound Prevention Posterior   Location of Wound Prevention Sacrum/Coccyx   Dressing Present  No   Wound Offloading (Prevention Methods) Bed, pressure reduction mattress

## 2019-12-21 NOTE — PROGRESS NOTES
TRANSFER - IN REPORT:    Verbal report received from Dee who reported to [employer] on [employer]  being received from [facility] for routine progression of care      Report consisted of patients Situation, Background, Assessment and   Recommendations(SBAR). Information from the following report(s) ED Summary was reviewed with the receiving nurse. Opportunity for questions and clarification was provided. Assessment to be completed upon patients arrival to unit and care will be assumed.

## 2019-12-21 NOTE — ED PROVIDER NOTES
Reyes Gentry is a 75 y.o. female who presents to the ED with a chief complaint of visual changes. She states she noticed some yesterday and describes it as a vague blurry sensation to her vision. She does have a history of cataracts and has had the left eye operated on but not the right eye. She has no pain. No arm, leg weakness, no trouble with her balance.              Past Medical History:   Diagnosis Date    Aortic atherosclerosis (HCC)     Dr. Sanaz Martinez following     Arthritis     Ascending aortic aneurysm (HCC)     4.2 cm, Followed by Dr. Sanaz Martinez- per office note dated 1/30/18- no change in size; follow up in 3 yrs     Chronic airway obstruction, not elsewhere classified     COPD diagnosed by PCP, pt states no symptoms- if anything very mild     GERD (gastroesophageal reflux disease)     Managed with meds     Hiatal hernia     Hyperlipemia     Pt denies     Hypertension     Managed with meds     Osteopenia     Vitamin D deficiency     10 + yrs ago per pt       Past Surgical History:   Procedure Laterality Date    HX CARPAL TUNNEL RELEASE Bilateral 2004, 2005    HX CATARACT REMOVAL  07/10/2019    Bilateral     HX TONSILLECTOMY      HX TUBAL LIGATION           Family History:   Problem Relation Age of Onset    Diabetes Mother     Cancer Mother         cervical, thyroid w/ thyroidectomy     Stroke Mother     Heart Attack Father     Other Father         collapsed lung; tobacco abuse     Diabetes Sister     Diabetes Brother     Cancer Brother         liver       Social History     Socioeconomic History    Marital status: SINGLE     Spouse name: Not on file    Number of children: Not on file    Years of education: Not on file    Highest education level: Not on file   Occupational History    Not on file   Social Needs    Financial resource strain: Not on file    Food insecurity:     Worry: Not on file     Inability: Not on file    Transportation needs:     Medical: Not on file Non-medical: Not on file   Tobacco Use    Smoking status: Former Smoker     Packs/day: 0.50     Years: 30.00     Pack years: 15.00     Last attempt to quit: 2005     Years since quittin.4    Smokeless tobacco: Never Used    Tobacco comment: 10-11 yrs ago quit   Substance and Sexual Activity    Alcohol use: Yes     Alcohol/week: 7.0 standard drinks     Types: 7 Glasses of wine per week    Drug use: Not Currently    Sexual activity: Not on file   Lifestyle    Physical activity:     Days per week: Not on file     Minutes per session: Not on file    Stress: Not on file   Relationships    Social connections:     Talks on phone: Not on file     Gets together: Not on file     Attends Sabianism service: Not on file     Active member of club or organization: Not on file     Attends meetings of clubs or organizations: Not on file     Relationship status: Not on file    Intimate partner violence:     Fear of current or ex partner: Not on file     Emotionally abused: Not on file     Physically abused: Not on file     Forced sexual activity: Not on file   Other Topics Concern     Service Not Asked    Blood Transfusions Not Asked    Caffeine Concern Not Asked    Occupational Exposure Not Asked   Chase Fent Hazards Not Asked    Sleep Concern Not Asked    Stress Concern Not Asked    Weight Concern Not Asked    Special Diet Not Asked    Back Care Not Asked    Exercise Yes     Comment: [employer] tiw    Bike Helmet Not Asked    Seat Belt Not Asked    Self-Exams Not Asked   Social History Narrative    Not on file         ALLERGIES: Patient has no known allergies. Review of Systems   Constitutional: Negative for chills, fatigue and fever. HENT: Negative for congestion. Eyes: Positive for visual disturbance. Negative for photophobia, discharge and itching. Respiratory: Negative for chest tightness, shortness of breath, wheezing and stridor.     Cardiovascular: Negative for chest pain and palpitations. Gastrointestinal: Negative for abdominal pain, diarrhea, nausea and vomiting. Musculoskeletal: Negative for neck pain and neck stiffness. Skin: Negative for color change, rash and wound. Neurological: Negative for dizziness, facial asymmetry, numbness and headaches. All other systems reviewed and are negative. Vitals:    12/21/19 1103 12/21/19 1130 12/21/19 1140   BP: (!) 179/104  (!) 172/94   Pulse: 65  61   Resp: 18  15   Temp: 98.2 °F (36.8 °C)     SpO2: 98% 99% 96%   Weight: 67.1 kg (148 lb)     Height: 5' 3\" (1.6 m)              Physical Exam  Vitals signs and nursing note reviewed. Constitutional:       General: She is not in acute distress. Appearance: She is well-developed. She is not ill-appearing. HENT:      Head: Normocephalic and atraumatic. Eyes:      General: No scleral icterus. Right eye: No discharge. Left eye: No discharge. Conjunctiva/sclera: Conjunctivae normal.      Comments: Left-sided hemianopsia present in both eyes. Neck:      Musculoskeletal: Normal range of motion and neck supple. Cardiovascular:      Rate and Rhythm: Normal rate and regular rhythm. Pulses: Normal pulses. Heart sounds: No murmur. No friction rub. No gallop. Pulmonary:      Effort: Pulmonary effort is normal. No respiratory distress. Breath sounds: No stridor. No wheezing, rhonchi or rales. Chest:      Chest wall: No tenderness. Abdominal:      General: Abdomen is flat. Tenderness: There is no tenderness. There is no guarding or rebound. Hernia: No hernia is present. Skin:     General: Skin is warm and dry. Capillary Refill: Capillary refill takes less than 2 seconds. Coloration: Skin is not jaundiced. Findings: No bruising. Neurological:      General: No focal deficit present. Mental Status: She is alert and oriented to person, place, and time. Comments: No drift in arms or legs. Steady gait. Psychiatric:         Mood and Affect: Mood normal.         Behavior: Behavior normal.          MDM  Number of Diagnoses or Management Options  Diagnosis management comments: Patient CT unfortunately shows multiple lesions likely metastatic disease I have informed her of these findings and have paged hospitalist for admission for further work-up. Nishi Guevara MD; 12/21/2019 @12:58 PM Voice dictation software was used during the making of this note. This software is not perfect and grammatical and other typographical errors may be present.   This note has not been proofread for errors.  ===================================================================          Amount and/or Complexity of Data Reviewed  Clinical lab tests: ordered and reviewed (Results for orders placed or performed during the hospital encounter of 12/21/19  -CBC WITH AUTOMATED DIFF       Result                      Value             Ref Range           WBC                         8.7               4.3 - 11.1 K*       RBC                         4.26              4.05 - 5.2 M*       HGB                         13.9              11.7 - 15.4 *       HCT                         40.9              35.8 - 46.3 %       MCV                         96.0              79.6 - 97.8 *       MCH                         32.6              26.1 - 32.9 *       MCHC                        34.0              31.4 - 35.0 *       RDW                         12.5              11.9 - 14.6 %       PLATELET                    242               150 - 450 K/*       MPV                         10.6              9.4 - 12.3 FL       ABSOLUTE NRBC               0.00              0.0 - 0.2 K/*       DF                          AUTOMATED                             NEUTROPHILS                 66                43 - 78 %           LYMPHOCYTES                 22                13 - 44 %           MONOCYTES                   10                4.0 - 12.0 %        EOSINOPHILS 1                 0.5 - 7.8 %         BASOPHILS                   0                 0.0 - 2.0 %         IMMATURE GRANULOCYTES       0                 0.0 - 5.0 %         ABS. NEUTROPHILS            5.7               1.7 - 8.2 K/*       ABS. LYMPHOCYTES            1.9               0.5 - 4.6 K/*       ABS. MONOCYTES              0.8               0.1 - 1.3 K/*       ABS. EOSINOPHILS            0.1               0.0 - 0.8 K/*       ABS. BASOPHILS              0.0               0.0 - 0.2 K/*       ABS. IMM. GRANS.            0.0               0.0 - 0.5 K/*  -METABOLIC PANEL, BASIC       Result                      Value             Ref Range           Sodium                      137               136 - 145 mm*       Potassium                   3.4 (L)           3.5 - 5.1 mm*       Chloride                    100               98 - 107 mmo*       CO2                         32                21 - 32 mmol*       Anion gap                   5 (L)             7 - 16 mmol/L       Glucose                     99                65 - 100 mg/*       BUN                         9                 8 - 23 MG/DL        Creatinine                  0.70              0.6 - 1.0 MG*       GFR est AA                  >60               >60 ml/min/1*       GFR est non-AA              >60               >60 ml/min/1*       Calcium                     9.3               8.3 - 10.4 M*  -PROTHROMBIN TIME + INR       Result                      Value             Ref Range           Prothrombin time            12.6              11.7 - 14.5 *       INR                         0.9                             )  Tests in the radiology section of CPT®: ordered and reviewed (Ct Head Wo Cont    Result Date: 12/21/2019  Noncontrast head CT Clinical Indication: Acute left side hemianopsia and vertigo. History of hypertension, COPD, aortic aneurysm. . Technique: Noncontrast axial images were obtained through the brain.  All CT scans at this location are performed using dose modulation techniques as appropriate including the following: Automated exposure control, adjustment of the MA and/or kV according to patient's size, or use of iterative reconstruction technique. Comparison: None available Findings: Multiple (greater than 6 total) small round to oval hypodense lesions are scattered throughout the cerebellum. There multiple areas of ill-defined low-attenuation involving the bilateral frontoparietal brain periventricular and subcortical white matter. The largest of these is right parietal measuring about 4-5 cm. There is no acute intracranial hemorrhage, hydrocephalus, herniation, midline shift,, or mass-effect. No abnormal extra-axial fluid collection. The mastoid air cells and paranasal sinuses are clear where imaged. No displaced skull fractures are present. Impression: 1. No hemorrhage. 2. Multiple lesions involving the cerebellum and cerebrum.  The appearance is suspicious for metastatic disease, possibly with associated infarcts.    )  Discuss the patient with other providers: yes  Independent visualization of images, tracings, or specimens: yes           Procedures

## 2019-12-21 NOTE — ED NOTES
TRANSFER - OUT REPORT:    Verbal report given to Fabio Blanco RN on Nena Mac  being transferred to [facility] 18  for routine progression of care       Report consisted of patients Situation, Background, Assessment and   Recommendations(SBAR). Information from the following report(s) ED Summary, MAR and Recent Results was reviewed with the receiving nurse. Lines:   Peripheral IV 12/21/19 Left Antecubital (Active)   Site Assessment Clean, dry, & intact 12/21/2019 11:32 AM   Phlebitis Assessment 0 12/21/2019 11:32 AM   Infiltration Assessment 0 12/21/2019 11:32 AM   Dressing Status Clean, dry, & intact 12/21/2019 11:32 AM   Dressing Type Transparent 12/21/2019 11:32 AM        Opportunity for questions and clarification was provided.       Patient transported with:   [employer]

## 2019-12-21 NOTE — H&P
HOSPITALIST H&P      NAME:  Meche Tipton   Age:  75 y.o.  :   1945   MRN:   [id number]    PCP: Giulia Medeiros, NANCY    Attending MD: Ravinder Campbell DO    Treatment Team: Attending Provider: Babita Clark MD; Primary Nurse: Reji Coreas RN    HPI:     Meche Tipton is a 75year old CF with a PMH of HTN, COPD, and OA with left MCKENZIE in 2019 presented to the ER with one day of left sided vision blindness. She states that she was in her office the day before and she noticed that she could see her right hand but not her left hand if they were held out in front of her. This lasted approximately 1.5 hours then improved. She then woke up the morning of admission and noticed that the left sided blindness had returned. When it continued for a few hours, she decided to come to the ER. Upon further questioning, the patient has also be having some ataxia/balance issues over the past month, but she thinks that it is due to her not fully being back to normal from her MCKENZIE in August. She denies CP/SOB/cough. No swallowing difficulties. No F/C/night sweats. No N/V/D/change in stools. No dysuria. No menstural bleeding.     Complete ROS done and is as stated in HPI or otherwise negative    Past Medical History:   Diagnosis Date    Aortic atherosclerosis ([address]     Dr. Silva Haile following     Arthritis     Ascending aortic aneurysm ([address]     4.2 cm, Followed by Dr. Silva Haile- per office note dated 18- no change in size; follow up in 3 yrs     Chronic airway obstruction, not elsewhere classified     COPD diagnosed by PCP, pt states no symptoms- if anything very mild     GERD (gastroesophageal reflux disease)     Managed with meds     Hiatal hernia     Hyperlipemia     Pt denies     Hypertension     Managed with meds     Osteopenia     Vitamin D deficiency     10 + yrs ago per pt        Past Surgical History:   Procedure Laterality Date    HX CARPAL TUNNEL RELEASE Bilateral , 2005    HX CATARACT REMOVAL  07/10/2019    Bilateral     HX TONSILLECTOMY      HX TUBAL LIGATION          Prior to Admission Medications   Prescriptions Last Dose Informant Patient Reported? Taking?   acetaminophen (TYLENOL) 500 mg tablet   Yes No   Sig: Take 500 mg by mouth every six (6) hours as needed for Pain. aspirin delayed-release 81 mg tablet   No No   Sig: Take 1 Tab by mouth every twelve (12) hours every twelve (12) hours. esomeprazole (NEXIUM) 40 mg capsule   Yes No   Sig: Take 40 mg by mouth daily as needed (GERD). losartan-hydrochlorothiazide (HYZAAR) 100-25 mg per tablet   No No   Sig: Take 1 Tab by mouth daily. magnesium oxide (MAG-OX) 400 mg tablet   Yes No   Sig: Take 500 mg by mouth nightly. polyethylene glycol (MIRALAX) 17 gram packet   Yes No   Sig: Take 17 g by mouth daily. Mix with 8 oz water   potassium 99 mg tablet   Yes No   Sig: Take 198 mg by mouth nightly. Facility-Administered Medications: None       No Known Allergies     Social History     Tobacco Use    Smoking status: Former Smoker     Packs/day: 0.50     Years: 30.00     Pack years: 15.00     Last attempt to quit: 2005     Years since quittin.4    Smokeless tobacco: Never Used    Tobacco comment: 10-11 yrs ago quit   Substance Use Topics    Alcohol use:  Yes     Alcohol/week: 7.0 standard drinks     Types: 7 Glasses of wine per week        Family History   Problem Relation Age of Onset    Diabetes Mother     Cancer Mother         cervical, thyroid w/ thyroidectomy     Stroke Mother     Heart Attack Father     Other Father         collapsed lung; tobacco abuse     Diabetes Sister     Diabetes Brother     Cancer Brother         liver        Objective:       Visit Vitals  BP (!) 172/94   Pulse 61   Temp 98.2 °F (36.8 °C)   Resp 15   Ht 5' 3\" (1.6 m)   Wt 67.1 kg (148 lb)   SpO2 96%   BMI 26.22 kg/m²        Temp (24hrs), Av.2 °F (36.8 °C), Min:98.2 °F (36.8 °C), Max:98.2 °F (36.8 °C)      Oxygen Therapy  O2 Sat (%): 96 % (12/21/19 1140)  Pulse via Oximetry: 63 beats per minute (12/21/19 1140)  O2 Device: Room air (12/21/19 1130)      Physical Exam:    General:    Alert, cooperative, no distress, appears stated age. Eyes:   PERRLA EOMI Anicteric  Head:   Normocephalic, without obvious abnormality, atraumatic. ENT:  Nares normal. No drainage. Lungs:   Clear to auscultation bilaterally. No Wheezing or Rhonchi. No rales. Heart:   Regular rate and rhythm,  no murmur, rub or gallop. No JVD. Abdomen:   Soft, non-tender. Not distended. Bowel sounds normal.   MSK:  No edema. No clubbing or cyanosis. No deformities/lesions. Skin:     Texture, turgor normal. No rashes or lesions. No Jaundice. Neurologic: Alert and oriented x 3, left sided hemianopsia, no pronator drift   Psychiatry:      No depression/anxiety. Mood congruent for context. Heme/Lymph/Immune: No echymoses or overt signs of bleeding. Lab/Data Review:  Recent Results (from the past 24 hour(s))   CBC WITH AUTOMATED DIFF    Collection Time: 12/21/19 11:30 AM   Result Value Ref Range    WBC 8.7 4.3 - 11.1 K/uL    RBC 4.26 4.05 - 5.2 M/uL    HGB 13.9 11.7 - 15.4 g/dL    HCT 40.9 35.8 - 46.3 %    MCV 96.0 79.6 - 97.8 FL    MCH 32.6 26.1 - 32.9 PG    MCHC 34.0 31.4 - 35.0 g/dL    RDW 12.5 11.9 - 14.6 %    PLATELET 820 756 - 054 K/uL    MPV 10.6 9.4 - 12.3 FL    ABSOLUTE NRBC 0.00 0.0 - 0.2 K/uL    DF AUTOMATED      NEUTROPHILS 66 43 - 78 %    LYMPHOCYTES 22 13 - 44 %    MONOCYTES 10 4.0 - 12.0 %    EOSINOPHILS 1 0.5 - 7.8 %    BASOPHILS 0 0.0 - 2.0 %    IMMATURE GRANULOCYTES 0 0.0 - 5.0 %    ABS. NEUTROPHILS 5.7 1.7 - 8.2 K/UL    ABS. LYMPHOCYTES 1.9 0.5 - 4.6 K/UL    ABS. MONOCYTES 0.8 0.1 - 1.3 K/UL    ABS. EOSINOPHILS 0.1 0.0 - 0.8 K/UL    ABS. BASOPHILS 0.0 0.0 - 0.2 K/UL    ABS. IMM.  GRANS. 0.0 0.0 - 0.5 K/UL   METABOLIC PANEL, BASIC    Collection Time: 12/21/19 11:30 AM   Result Value Ref Range    Sodium 137 136 - 145 mmol/L    Potassium 3.4 (L) 3.5 - 5.1 mmol/L    Chloride 100 98 - 107 mmol/L    CO2 32 21 - 32 mmol/L    Anion gap 5 (L) 7 - 16 mmol/L    Glucose 99 65 - 100 mg/dL    BUN 9 8 - 23 MG/DL    Creatinine 0.70 0.6 - 1.0 MG/DL    GFR est AA >60 >60 ml/min/1.73m2    GFR est non-AA >60 >60 ml/min/1.73m2    Calcium 9.3 8.3 - 10.4 MG/DL   PROTHROMBIN TIME + INR    Collection Time: 12/21/19 11:30 AM   Result Value Ref Range    Prothrombin time 12.6 11.7 - 14.5 sec    INR 0.9         Imaging:  Ct Head Wo Cont    Result Date: 12/21/2019  Impression: 1. No hemorrhage. 2. Multiple lesions involving the cerebellum and cerebrum. The appearance is suspicious for metastatic disease, possibly with associated infarcts. Cultures: All Micro Results     None          Assessment/Plan:     Principal Problem:    Cerebral mass (12/21/2019)  - CT Head shows multiple areas of ill-defined low-attenuation involving the bilateral frontoparietal brain periventricular and subcortical white matter.  The largest of these is right parietal measuring about 4-5 cm.  - Neurosurgery looked at imaging --> recommended Decadron/Keppra and no surgical intervention  - Check MRI Brain  - Check CT C/A/P with contrast  - Check cancer antigens  - Check calcitonin since uncle with thyroid cancer  - Continue ASA daily    Active Problems:    Cerebellar mass (12/21/2019)  - CT Head shows multiple (greater than 6 total) small round to oval hypodense lesions that are scattered throughout the cerebellum  - Check MRI Brain  - Check CT C/A/P with contrast  - Check cancer antigens      Left homonymous hemianopsia (12/21/2019)  - Likely due to brain masses  - Teleneuro consulted  - PT/OT      Ataxia (12/21/2019)  - I suspect this is due to her cerebellar masses  - PT/OT      Hypertension ()  - Mildly elevated in ER  - Will allow some permissive HTN until MRI completed  - Continue home meds      GERD (gastroesophageal reflux disease) ()  - Continue Protonix    Code Status: FULL CODE  DVT Prophylaxis: Heparin    Anticipated discharge: 3-4 days    Ester Hanna DO  1:38 PM

## 2019-12-21 NOTE — PROGRESS NOTES
Problem: Falls - Risk of  Goal: *Absence of Falls  Description  Document Rebbecca Persons Fall Risk and appropriate interventions in the flowsheet.   Outcome: Progressing Towards Goal  Note: Fall Risk Interventions:  Mobility Interventions: Patient to call before getting OOB, PT Consult for mobility concerns    Medication Interventions: Patient to call before getting OOB  Problem: Patient Education: Go to Patient Education Activity  Goal: Patient/Family Education  Outcome: Progressing Towards Goal

## 2019-12-22 ENCOUNTER — APPOINTMENT (OUTPATIENT)
Dept: MRI IMAGING | Age: 74
DRG: 066 | End: 2019-12-22
Attending: INTERNAL MEDICINE
Payer: MEDICARE

## 2019-12-22 ENCOUNTER — APPOINTMENT (OUTPATIENT)
Dept: CT IMAGING | Age: 74
DRG: 066 | End: 2019-12-22
Attending: INTERNAL MEDICINE
Payer: MEDICARE

## 2019-12-22 PROBLEM — I63.9 ACUTE ISCHEMIC STROKE (HCC): Status: ACTIVE | Noted: 2019-12-22

## 2019-12-22 LAB
ANION GAP SERPL CALC-SCNC: 8 MMOL/L (ref 7–16)
BUN SERPL-MCNC: 14 MG/DL (ref 8–23)
CALCIUM SERPL-MCNC: 9.1 MG/DL (ref 8.3–10.4)
CHLORIDE SERPL-SCNC: 101 MMOL/L (ref 98–107)
CO2 SERPL-SCNC: 26 MMOL/L (ref 21–32)
CREAT SERPL-MCNC: 0.73 MG/DL (ref 0.6–1)
GLUCOSE SERPL-MCNC: 146 MG/DL (ref 65–100)
MM INDURATION POC: 0 MM (ref 0–5)
POTASSIUM SERPL-SCNC: 3.4 MMOL/L (ref 3.5–5.1)
PPD POC: NEGATIVE NEGATIVE
SODIUM SERPL-SCNC: 135 MMOL/L (ref 136–145)

## 2019-12-22 PROCEDURE — 86304 IMMUNOASSAY TUMOR CA 125: CPT

## 2019-12-22 PROCEDURE — 74011000258 HC RX REV CODE- 258: Performed by: INTERNAL MEDICINE

## 2019-12-22 PROCEDURE — 99222 1ST HOSP IP/OBS MODERATE 55: CPT | Performed by: PSYCHIATRY & NEUROLOGY

## 2019-12-22 PROCEDURE — 80048 BASIC METABOLIC PNL TOTAL CA: CPT

## 2019-12-22 PROCEDURE — 65270000029 HC RM PRIVATE

## 2019-12-22 PROCEDURE — 74011636320 HC RX REV CODE- 636/320: Performed by: INTERNAL MEDICINE

## 2019-12-22 PROCEDURE — 70553 MRI BRAIN STEM W/O & W/DYE: CPT

## 2019-12-22 PROCEDURE — 86301 IMMUNOASSAY TUMOR CA 19-9: CPT

## 2019-12-22 PROCEDURE — 71260 CT THORAX DX C+: CPT

## 2019-12-22 PROCEDURE — 74011250637 HC RX REV CODE- 250/637: Performed by: INTERNAL MEDICINE

## 2019-12-22 PROCEDURE — 97161 PT EVAL LOW COMPLEX 20 MIN: CPT

## 2019-12-22 PROCEDURE — 36415 COLL VENOUS BLD VENIPUNCTURE: CPT

## 2019-12-22 PROCEDURE — 74011250636 HC RX REV CODE- 250/636: Performed by: INTERNAL MEDICINE

## 2019-12-22 PROCEDURE — 97165 OT EVAL LOW COMPLEX 30 MIN: CPT

## 2019-12-22 PROCEDURE — A9575 INJ GADOTERATE MEGLUMI 0.1ML: HCPCS | Performed by: INTERNAL MEDICINE

## 2019-12-22 RX ORDER — GADOTERATE MEGLUMINE 376.9 MG/ML
13 INJECTION INTRAVENOUS
Status: COMPLETED | OUTPATIENT
Start: 2019-12-22 | End: 2019-12-22

## 2019-12-22 RX ORDER — SODIUM CHLORIDE 0.9 % (FLUSH) 0.9 %
10 SYRINGE (ML) INJECTION
Status: COMPLETED | OUTPATIENT
Start: 2019-12-22 | End: 2019-12-22

## 2019-12-22 RX ADMIN — LOSARTAN POTASSIUM: 50 TABLET, FILM COATED ORAL at 21:26

## 2019-12-22 RX ADMIN — ACETAMINOPHEN 500 MG: 500 TABLET, FILM COATED ORAL at 16:12

## 2019-12-22 RX ADMIN — Medication 400 MG: at 21:26

## 2019-12-22 RX ADMIN — Medication 10 ML: at 13:57

## 2019-12-22 RX ADMIN — IOPAMIDOL 100 ML: 755 INJECTION, SOLUTION INTRAVENOUS at 14:10

## 2019-12-22 RX ADMIN — Medication 10 ML: at 14:10

## 2019-12-22 RX ADMIN — ASPIRIN 81 MG: 81 TABLET, COATED ORAL at 09:08

## 2019-12-22 RX ADMIN — PANTOPRAZOLE SODIUM 40 MG: 40 TABLET, DELAYED RELEASE ORAL at 09:08

## 2019-12-22 RX ADMIN — HEPARIN SODIUM 5000 UNITS: 5000 INJECTION INTRAVENOUS; SUBCUTANEOUS at 21:29

## 2019-12-22 RX ADMIN — DEXAMETHASONE SODIUM PHOSPHATE 4 MG: 4 INJECTION, SOLUTION INTRAMUSCULAR; INTRAVENOUS at 06:22

## 2019-12-22 RX ADMIN — Medication 5 ML: at 21:31

## 2019-12-22 RX ADMIN — LEVETIRACETAM 500 MG: 500 TABLET ORAL at 17:32

## 2019-12-22 RX ADMIN — DEXAMETHASONE SODIUM PHOSPHATE 4 MG: 4 INJECTION, SOLUTION INTRAMUSCULAR; INTRAVENOUS at 16:12

## 2019-12-22 RX ADMIN — Medication 5 ML: at 06:22

## 2019-12-22 RX ADMIN — SODIUM CHLORIDE 100 ML: 900 INJECTION, SOLUTION INTRAVENOUS at 14:10

## 2019-12-22 RX ADMIN — GADOTERATE MEGLUMINE 13 ML: 376.9 INJECTION INTRAVENOUS at 13:57

## 2019-12-22 RX ADMIN — HEPARIN SODIUM 5000 UNITS: 5000 INJECTION INTRAVENOUS; SUBCUTANEOUS at 09:08

## 2019-12-22 RX ADMIN — ASPIRIN 81 MG: 81 TABLET, COATED ORAL at 21:26

## 2019-12-22 RX ADMIN — Medication 10 ML: at 16:15

## 2019-12-22 RX ADMIN — DIATRIZOATE MEGLUMINE AND DIATRIZOATE SODIUM 15 ML: 660; 100 LIQUID ORAL; RECTAL at 12:35

## 2019-12-22 RX ADMIN — LEVETIRACETAM 500 MG: 500 TABLET ORAL at 09:08

## 2019-12-22 NOTE — PROGRESS NOTES
Problem: Self Care Deficits Care Plan (Adult)  Goal: *Acute Goals and Plan of Care (Insert Text)  Description  1. Patient will perform grooming with supervision. 2. Patient will perform Upper body dressing with supervision  3. Patient will perform lower body dressing with supervision  4. Patient will perform upper and lower body bathing with supervision. 5. Patient will perform toilet transfers with SBA  6. Patient will perform shower transfer with SBA  7. Patient will participate in 30 + minutes of ADL/ therapeutic exercise/therapeutic activity with min rest breaks to increase activity tolerance for self care. 8. Patient will perform ADL functional mobility in room with SBA  9. Patient will demonstrate knowledge of visual compensatory skills tasks with min cues. Goals to be achieved in 7 days. Outcome: Progressing Towards Goal     OCCUPATIONAL THERAPY: Initial Assessment and PM 12/22/2019  INPATIENT: OT Visit Days: 1  Payor: Lisandro Medina / Plan: [address] [place] [address] HMO / Product Type: [employer] /      NAME/AGE/GENDER: Jeison Figueroa is a 75 y.o. female   PRIMARY DIAGNOSIS:  Left homonymous hemianopsia [H53.462]  Cerebral mass [G93.89]  Cerebellar mass [G93.89] Cerebral mass Cerebral mass        ICD-10: Treatment Diagnosis:    Difficulty in walking, Not elsewhere classified (R26.2)  Other abnormalities of gait and mobility (R26.89)   Precautions/Allergies:     Patient has no known allergies. ASSESSMENT:     Ms. Gloria Rey presents supine in bed agreeable to evaluation. She is supervision supine to sit and SBA sit to stand, CGA for transfers and mobility with RW. She has vision loss in which she is trying to compensate for. OT educated on compensatory techniques to use. She has decreased standing balance and had her hip replaced earlier this year. She is very fearful of falling on her new hip. She reported she has to take her time with mobility to ensure she doesn't fall.   She has a two level home and she works in commercial real estate./  Her sister lives with her but the sister is on chronic o2. She would benefit from skilled OT will follow. This section established at most recent assessment   PROBLEM LIST (Impairments causing functional limitations):  Decreased ADL/Functional Activities  Decreased Transfer Abilities  Decreased Ambulation Ability/Technique  Decreased Balance  Decreased vision skills    INTERVENTIONS PLANNED: (Benefits and precautions of occupational therapy have been discussed with the patient.)  Activities of daily living training  Adaptive equipment training  Balance training  Clothing management  Donning&doffing training  Neuromuscular re-eduation  Therapeutic activity  Therapeutic exercise       TREATMENT PLAN: Frequency/Duration: Follow patient 3 times to address above goals. Rehabilitation Potential For Stated Goals: Good     REHAB RECOMMENDATIONS (at time of discharge pending progress):    Placement: It is my opinion, based on this patient's performance to date, that Ms. Claudette Armstrong may benefit from participating in 1-2 additional therapy sessions in order to continue to assess for rehab potential and then make recommendation for disposition at discharge. Equipment:   None at this time              OCCUPATIONAL PROFILE AND HISTORY:   History of Present Injury/Illness (Reason for Referral):  See h and P  Past Medical History/Comorbidities:   Ms. Claudette Armstrong  has a past medical history of Aortic atherosclerosis ([address] Arthritis, Ascending aortic aneurysm ([address] Chronic airway obstruction, not elsewhere classified, GERD (gastroesophageal reflux disease), Hiatal hernia, Hyperlipemia, Hypertension, Osteopenia, and Vitamin D deficiency. Ms. Claudette Armstrong  has a past surgical history that includes hx carpal tunnel release (Bilateral, 2004, 2005); hx tonsillectomy; hx cataract removal (07/10/2019); and hx tubal ligation.   Social History/Living Environment:   Home Environment: Private residence  One/Two Story Residence: Two story  Living Alone: No  Support Systems: Family member(s)(her sister lives wtih her but sister on O2)  Current DME Used/Available at Home: Cane, straight  Tub or Shower Type: Tub/Shower combination  Prior Level of Function/Work/Activity:  Mod I with ADLs     Number of Personal Factors/Comorbidities that affect the Plan of Care: Brief history (0):  LOW COMPLEXITY   ASSESSMENT OF OCCUPATIONAL PERFORMANCE[de-identified]   Activities of Daily Living:   Basic ADLs (From Assessment) Complex ADLs (From Assessment)   Feeding: Independent  Oral Facial Hygiene/Grooming: Supervision  Bathing: Stand-by assistance  Upper Body Dressing: Supervision  Lower Body Dressing: Stand-by assistance  Toileting: Stand by assistance     Grooming/Bathing/Dressing Activities of Daily Living     Cognitive Retraining  Safety/Judgement: Awareness of environment; Insight into deficits                       Bed/Mat Mobility  Supine to Sit: Supervision  Sit to Supine: Supervision  Sit to Stand: Contact guard assistance  Stand to Sit: Contact guard assistance  Scooting: Supervision     Most Recent Physical Functioning:   Gross Assessment:                  Posture:     Balance:  Sitting: Intact  Standing: With support Bed Mobility:  Supine to Sit: Supervision  Sit to Supine: Supervision  Scooting: Supervision  Wheelchair Mobility:     Transfers:  Sit to Stand: Contact guard assistance  Stand to Sit: Contact guard assistance            Patient Vitals for the past 6 hrs:   BP SpO2 Pulse   12/22/19 1237 139/75 92 % 68       Mental Status  Neurologic State: Alert, Appropriate for age  Orientation Level: Appropriate for age  Cognition: Appropriate for age attention/concentration, Follows commands  Perception: Appears intact  Perseveration: No perseveration noted  Safety/Judgement: Awareness of environment, Insight into deficits                          Physical Skills Involved:  Balance Cognitive Skills Affected (resulting in the inability to perform in a timely and safe manner):  [facility]  Psychosocial Skills Affected:  Habits/Routines  Self-Awareness   Number of elements that affect the Plan of Care: 1-3:  LOW COMPLEXITY   CLINICAL DECISION MAKIN[address] [place] [address] AM-PAC 6 Clicks   Daily Activity Inpatient Short Form  How much help from another person does the patient currently need. .. Total A Lot A Little None   1. Putting on and taking off regular lower body clothing? [] 1   [] 2   [x] 3   [] 4   2. Bathing (including washing, rinsing, drying)? [] 1   [] 2   [x] 3   [] 4   3. Toileting, which includes using toilet, bedpan or urinal?   [] 1   [] 2   [x] 3   [] 4   4. Putting on and taking off regular upper body clothing? [] 1   [] 2   [] 3   [x] 4   5. Taking care of personal grooming such as brushing teeth? [] 1   [] 2   [] 3   [x] 4   6. Eating meals? [] 1   [] 2   [] 3   [x] 4   © , Trustees of [address] [place] [address], under license to [employer]. All rights reserved      Score:  Initial:21 Most Recent: X (Date: -- )    Interpretation of Tool:  Represents activities that are increasingly more difficult (i.e. Bed mobility, Transfers, Gait).     Medical Necessity:     · Patient is expected to demonstrate progress in   · Self care skills and functional mobility  ·     Reason for Services/Other Comments:  · Patient continues to require skilled intervention due to   · Above listed deficits     Use of outcome tool(s) and clinical judgement create a POC that gives a: LOW COMPLEXITY         TREATMENT:   (In addition to Assessment/Re-Assessment sessions the following treatments were rendered)     Pre-treatment Symptoms/Complaints:    Pain: Initial:   Pain Intensity 1: 0  Post Session:  010     Assessment/Reassessment only, no treatment provided today    Braces/Orthotics/Lines/Etc:   O2 Device: Room air  Treatment/Session Assessment:    Response to Treatment:  tolerated well, decreased standing balance, and vision loss still prevelant  Interdisciplinary Collaboration:   Physical Therapist  Occupational Therapist  Registered Nurse  After treatment position/precautions:   Supine in bed  Bed/Chair-wheels locked  Bed in low position  Call light within reach  RN notified  Family at bedside  Side rails x 3   Compliance with Program/Exercises: Compliant all of the time. Recommendations/Intent for next treatment session: \"Next visit will focus on advancements to more challenging activities and reduction in assistance provided\".   Total Treatment Duration:  OT Patient Time In/Time Out  Time In: 1300  Time Out: [address] Tish Mejia, OT

## 2019-12-22 NOTE — PROGRESS NOTES
Problem: Mobility Impaired (Adult and Pediatric)  Goal: *Acute Goals and Plan of Care (Insert Text)  Description    LTG:  (1.)Ms. Eusebio Goodwin will move from supine to sit and sit to supine  in bed with INDEPENDENT within 7 treatment day(s). (2.)Ms. Eusebio Goodwin will transfer from bed to chair and chair to bed with SUPERVISION using the least restrictive device within 7 treatment day(s). (3.)Ms. Eusebio Goodwin will ambulate with SUPERVISION for 300 feet with the least restrictive device within 7 treatment day(s). (4)Ms. Eusebio Goodwin will perform hep with cues and assistance in 7 days. (5)Ms. Eusebio Goodwin will go up a flight of stairs with rail CGA in 7 days. ________________________________________________________________________________________________     Outcome: Progressing Towards Goal     PHYSICAL THERAPY: Initial Assessment and PM 12/22/2019  INPATIENT: PT Visit Days : 1  Payor: Natasha Dudley / Plan: [address] [place] [address] HMO / Product Type: [employer] /       NAME/AGE/GENDER: Tommy Gilbert is a 75 y.o. female   PRIMARY DIAGNOSIS: Left homonymous hemianopsia [H53.462]  Cerebral mass [G93.89]  Cerebellar mass [G93.89] Cerebral mass Cerebral mass        ICD-10: Treatment Diagnosis:    · Other abnormalities of gait and mobility (R26.89)   Precaution/Allergies:  Patient has no known allergies. ASSESSMENT:     Ms. Eusebio Goodwin presents with decreased left vision and decreased balance. She is normally independent with all mobility and works. Her symptoms began 2 days ago. She ambulated down the elaine but needs hand held assist as her balance is decreased from normal.  Her left eye vision is altered. She has limited higher level balance as well. We discussed safety and her being at risk for falling. He needs UE assistance for support when she is up. She would benefit from at least a cane and maybe a walker. Will follow. Stressed to her not to get up on her own.     This section established at most recent assessment PROBLEM LIST (Impairments causing functional limitations):  1. Decreased Strength  2. Decreased ADL/Functional Activities  3. Decreased Transfer Abilities  4. Decreased Ambulation Ability/Technique  5. Decreased Balance  6. Decreased Flexibility/Joint Mobility  7. Decreased [place] with Home Exercise Program   INTERVENTIONS PLANNED: (Benefits and precautions of physical therapy have been discussed with the patient.)  1. Balance Exercise  2. Bed Mobility  3. Home Exercise Program (HEP)  4. Neuromuscular Re-education/Strengthening  5. Therapeutic Activites  6. Therapeutic Exercise/Strengthening  7. Transfer Training     TREATMENT PLAN: Frequency/Duration: daily for duration of hospital stay  Rehabilitation Potential For Stated Goals: [facility] (at time of discharge pending progress):    Placement:  HHPT? Equipment:    May need a RW               HISTORY:   History of Present Injury/Illness (Reason for Referral):  Anirudh Hoff is a 75year old CF with a PMH of HTN, COPD, and OA with left MCKENZIE in 8/2019 presented to the ER with one day of left sided vision blindness. She states that she was in her office the day before and she noticed that she could see her right hand but not her left hand if they were held out in front of her. This lasted approximately 1.5 hours then improved. She then woke up the morning of admission and noticed that the left sided blindness had returned. When it continued for a few hours, she decided to come to the ER. Upon further questioning, the patient has also be having some ataxia/balance issues over the past month, but she thinks that it is due to her not fully being back to normal from her MCKENZIE in August. She denies CP/SOB/cough. No swallowing difficulties. No F/C/night sweats. No N/V/D/change in stools. No dysuria. No menstural bleeding.   Past Medical History/Comorbidities:   Ms. Guille Marroquin  has a past medical history of Aortic atherosclerosis ([facility] [address] Arthritis, Ascending aortic aneurysm (HCC), Chronic airway obstruction, not elsewhere classified, GERD (gastroesophageal reflux disease), Hiatal hernia, Hyperlipemia, Hypertension, Osteopenia, and Vitamin D deficiency. Ms. Chauncey Denny  has a past surgical history that includes hx carpal tunnel release (Bilateral, 2004, 2005); hx tonsillectomy; hx cataract removal (07/10/2019); and hx tubal ligation. Social History/Living Environment:   Home Environment: Private residence  One/Two Story Residence: Two story  Living Alone: No  Support Systems: Family member(s)(her sister lives wtih her but sister on O2)  Current DME Used/Available at Home: Cane, straight  Tub or Shower Type: Tub/Shower combination  Prior Level of Function/Work/Activity:  Independent, works     Number of Personal Factors/Comorbidities that affect the Plan of Care: 1-2: MODERATE COMPLEXITY   EXAMINATION:   Most Recent Physical Functioning:   Gross Assessment:  AROM: Within functional limits(LE's)  Strength: Generally decreased, functional(LE's)               Posture:     Balance:  Sitting: Intact  Standing: With support Bed Mobility:  Supine to Sit: Supervision  Sit to Supine: Supervision  Wheelchair Mobility:     Transfers:  Sit to Stand: Contact guard assistance  Stand to Sit: Contact guard assistance  Bed to Chair: Contact guard assistance  Gait:     Speed/Prabha: Delayed  Gait Abnormalities: Path deviations;Decreased step clearance  Distance (ft): 100 Feet (ft)  Assistive Device: Other (comment)(hand held assist)  Ambulation - Level of Assistance: Minimal assistance;Contact guard assistance  Interventions: Manual cues; Safety awareness training; Tactile cues; Verbal cues      Body Structures Involved:  1. Bones  2. Joints  3. Muscles  4. Ligaments Body Functions Affected:  1. Movement Related Activities and Participation Affected:  1.  Mobility   Number of elements that affect the Plan of Care: 3: MODERATE COMPLEXITY   CLINICAL PRESENTATION:   Presentation: Stable and uncomplicated: LOW COMPLEXITY   CLINICAL DECISION MAKIN[address] [place] [id number] AM-PAC 6 Clicks   Basic Mobility Inpatient Short Form  How much difficulty does the patient currently have. .. Unable A Lot A Little None   1. Turning over in bed (including adjusting bedclothes, sheets and blankets)? [] 1   [] 2   [x] 3   [] 4   2. Sitting down on and standing up from a chair with arms ( e.g., wheelchair, bedside commode, etc.)   [] 1   [] 2   [x] 3   [] 4   3. Moving from lying on back to sitting on the side of the bed? [] 1   [] 2   [x] 3   [] 4   How much help from another person does the patient currently need. .. Total A Lot A Little None   4. Moving to and from a bed to a chair (including a wheelchair)? [] 1   [] 2   [x] 3   [] 4   5. Need to walk in hospital room? [] 1   [] 2   [x] 3   [] 4   6. Climbing 3-5 steps with a railing? [] 1   [] 2   [x] 3   [] 4   © , Trustees of [address] [place] [address] [address] [address], under license to [employer]. All rights reserved      Score:  Initial: 18 Most Recent: X (Date: -- )    Interpretation of Tool:  Represents activities that are increasingly more difficult (i.e. Bed mobility, Transfers, Gait). Medical Necessity:     · Patient is expected to demonstrate progress in   · strength, range of motion, and balance  ·  to   · decrease assistance required with exercises and functional mobility   · . Reason for Services/Other Comments:  · Patient   · continues to require present interventions due to patient's inability to perform exercises and functional mobility independently   · .    Use of outcome tool(s) and clinical judgement create a POC that gives a: Clear prediction of patient's progress: LOW COMPLEXITY            TREATMENT:   (In addition to Assessment/Re-Assessment sessions the following treatments were rendered)   Pre-treatment Symptoms/Complaints:  balance and left eye  Pain: Initial:      Post Session:  0     Assessment/Reassessment only, no treatment provided today    Braces/Orthotics/Lines/Etc:   · O2 Device: Room air  Treatment/Session Assessment:    · Response to Treatment:  did ok  · Interdisciplinary Collaboration:   o Occupational Therapist  o Registered Nurse  · After treatment position/precautions:   o Supine in bed  o Bed/Chair-wheels locked  o Bed in low position  o Caregiver at bedside  o Call light within reach  o Family at bedside   · Compliance with Program/Exercises: Will assess as treatment progresses  · Recommendations/Intent for next treatment session: \"Next visit will focus on advancements to more challenging activities and reduction in assistance provided\".   Total Treatment Duration:  PT Patient Time In/Time Out  Time In: 1310  Time Out: [facility]

## 2019-12-22 NOTE — CONSULTS
[facility]  [address] [facility], [address] [facility]          Chief Complaint   Patient presents with   Kassy Aquino is a 75 y.o. female who presents for evaluation of a homonymous hemianopsia.   Patient had experienced a visual deficit for several days before coming to the hospital.      Past Medical History:   Diagnosis Date    Aortic atherosclerosis ([address]     Dr. Lupis Birch following     Arthritis     Ascending aortic aneurysm ([facility] [address]     4.2 cm, Followed by Dr. Lupis Birch- per office note dated 18- no change in size; follow up in 3 yrs     Chronic airway obstruction, not elsewhere classified     COPD diagnosed by PCP, pt states no symptoms- if anything very mild     GERD (gastroesophageal reflux disease)     Managed with meds     Hiatal hernia     Hyperlipemia     Pt denies     Hypertension     Managed with meds     Osteopenia     Vitamin D deficiency     10 + yrs ago per pt       Past Surgical History:   Procedure Laterality Date    HX CARPAL TUNNEL RELEASE Bilateral ,     HX CATARACT REMOVAL  07/10/2019    Bilateral     HX TONSILLECTOMY      HX TUBAL LIGATION         Family History   Problem Relation Age of Onset    Diabetes Mother     Cancer Mother         cervical, thyroid w/ thyroidectomy     Stroke Mother     Heart Attack Father     Other Father         collapsed lung; tobacco abuse     Diabetes Sister     Diabetes Brother     Cancer Brother         liver       Social History     Socioeconomic History    Marital status: SINGLE     Spouse name: Not on file    Number of children: Not on file    Years of education: Not on file    Highest education level: Not on file   Tobacco Use    Smoking status: Former Smoker     Packs/day: 0.50     Years: 30.00     Pack years: 15.00     Last attempt to quit: 2005     Years since quittin.4    Smokeless tobacco: Never Used    Tobacco comment: 10-11 yrs ago quit   Substance and Sexual Activity    Alcohol use: Yes     Alcohol/week: 7.0 standard drinks     Types: 7 Glasses of wine per week    Drug use: Not Currently   Other Topics Concern    Exercise Yes     Comment: [employer] tiw           Current Facility-Administered Medications:     aspirin delayed-release tablet 81 mg, 81 mg, Oral, Q12H, Crete, Camden E, DO, 81 mg at 12/22/19 0908    acetaminophen (TYLENOL) tablet 500 mg, 500 mg, Oral, Q6H PRN, Crete, Kiko E, DO, 500 mg at 12/22/19 1612    pantoprazole (PROTONIX) tablet 40 mg, 40 mg, Oral, ACB, Crete, Camden E, DO, 40 mg at 12/22/19 0908    magnesium oxide (MAG-OX) tablet 400 mg, 400 mg, Oral, QHS, Crete, Kiko E, DO, 400 mg at 12/21/19 2131    polyethylene glycol (MIRALAX) packet 17 g, 17 g, Oral, DAILY, Crete, Camden E, DO    sodium chloride (NS) flush 5-40 mL, 5-40 mL, IntraVENous, Q8H, Janey, Camden E, DO, 10 mL at 12/22/19 1615    sodium chloride (NS) flush 5-40 mL, 5-40 mL, IntraVENous, PRN, Janey, Kiko E, DO    HYDROcodone-acetaminophen (NORCO) 5-325 mg per tablet 1 Tab, 1 Tab, Oral, Q4H PRN, Janey, Camden E, DO    ondansetron (ZOFRAN) injection 4 mg, 4 mg, IntraVENous, Q4H PRN, Crete, Camden E, DO    heparin (porcine) injection 5,000 Units, 5,000 Units, SubCUTAneous, Q12H, Janey, Camden E, DO, 5,000 Units at 12/22/19 0908    dexamethasone (DECADRON) 4 mg/mL injection 4 mg, 4 mg, IntraVENous, Q8H, Crete, Kiko E, DO, 4 mg at 12/22/19 1612    levETIRAcetam (KEPPRA) tablet 500 mg, 500 mg, Oral, BID, Crete, Camden E, DO, 500 mg at 12/22/19 1732    losartan/hydroCHLOROthiazide (HYZAAR) 100/25 mg, , Oral, QHS, Janey, Kiko E, DO    No Known Allergies    Review of Systems   Constitutional: Negative. HENT: Negative. Eyes: Positive for visual disturbance. Respiratory: Negative. Endocrine: Negative. Genitourinary: Negative. Allergic/Immunologic: Negative. Neurological: Negative. Hematological: Negative. Psychiatric/Behavioral: Negative. Visit Vitals  /67 (BP 1 Location: Right arm)   Pulse 72   Temp 96.6 °F (35.9 °C)   Resp 18   Ht 5' 3\" (1.6 m)   Wt 148 lb (67.1 kg)   SpO2 96%   BMI 26.22 kg/m²       Neurologic Examtele-neurology  The patient is alert cooperative and oriented  Patient looks well-hydrated. Does not appear chronically ill. Cranial nerve examination . There is a left homonymous hemianopsia that is evidence on the tele-neuro exam normal random eye movements. No ptosis. No lid lag  Face moves strongly symmetrically and equally  Speech is normal  Motor control of the upper extremities is normal.  No drift in the legs  Casual gait is normal with no evidence of ataxia  Fine motor coordination is normal    Peripheral sensation light touch normal    Most recent MRI  Results from Hospital Encounter encounter on 12/21/19   MRI BRAIN W WO CONT    Narrative EXAMINATION: BRAIN MRI 12/22/2019 1:57 PM    ACCESSION NUMBER: [id number]    INDICATION: Brain masses on CT Head, left hemianopsia    COMPARISON: Head CT 12/21/2019    TECHNIQUE: Multiplanar multisequence MRI of the brain without and with  intravenous administration of 13 cc Dotarem contrast agent. FINDINGS:    There is restricted diffusion involving the posterior right temporal lobe and  extending into the right occipital lobe. This involves the deep but not the  peripheral cortex. There is associated T2 hyperintensity. There is no associated  abnormal postcontrast enhancement. There are numerous chronic lacunar infarctions in the cerebellar hemispheres  bilaterally. Prior left lens replacement. The expected large intracranial vascular flow voids are preserved. There is no  evidence of intracranial blood products. The ventricles are within normal limits for the mild degree of global brain  parenchymal volume loss. There is no midline shift. The basilar cisterns are  patent.  There is no cerebellar tonsillar ectopia or herniation. There are T2 hyperintensities in the periventricular and subcortical white  matter, a nonspecific finding which likely represents chronic microangiopathy. There is no abnormal intra or extra-axial postcontrast enhancement. There are no suspicious osseous lesions. Impression IMPRESSION:    1. There is a large focus of restricted diffusion and T2 hyperintensity  involving the deep cortex and white matter of the right occipital lobe. There is  no associated postcontrast enhancement or blood products. This most likely  represents an acute or early subacute infarction. Follow-up to resolution is  recommended to exclude an underlying process. 2. No evidence of intracranial mass lesion or metastatic disease. 3. Mild to moderate burden of chronic microangiopathy. VOICE DICTATED BY: Dr. Mariel Mccallum           Most recent MRA  Results from [place] encounter on 12/21/19   MRI BRAIN W WO CONT    Narrative EXAMINATION: BRAIN MRI 12/22/2019 1:57 PM    ACCESSION NUMBER: [id number]    INDICATION: Brain masses on CT Head, left hemianopsia    COMPARISON: Head CT 12/21/2019    TECHNIQUE: Multiplanar multisequence MRI of the brain without and with  intravenous administration of 13 cc Dotarem contrast agent. FINDINGS:    There is restricted diffusion involving the posterior right temporal lobe and  extending into the right occipital lobe. This involves the deep but not the  peripheral cortex. There is associated T2 hyperintensity. There is no associated  abnormal postcontrast enhancement. There are numerous chronic lacunar infarctions in the cerebellar hemispheres  bilaterally. Prior left lens replacement. The expected large intracranial vascular flow voids are preserved. There is no  evidence of intracranial blood products. The ventricles are within normal limits for the mild degree of global brain  parenchymal volume loss. There is no midline shift. The basilar cisterns are  patent. There is no cerebellar tonsillar ectopia or herniation. There are T2 hyperintensities in the periventricular and subcortical white  matter, a nonspecific finding which likely represents chronic microangiopathy. There is no abnormal intra or extra-axial postcontrast enhancement. There are no suspicious osseous lesions. Impression IMPRESSION:    1. There is a large focus of restricted diffusion and T2 hyperintensity  involving the deep cortex and white matter of the right occipital lobe. There is  no associated postcontrast enhancement or blood products. This most likely  represents an acute or early subacute infarction. Follow-up to resolution is  recommended to exclude an underlying process. 2. No evidence of intracranial mass lesion or metastatic disease. 3. Mild to moderate burden of chronic microangiopathy. VOICE DICTATED BY: Dr. Bret Gill           Most recent CTA  Results from [place] encounter on 12/21/19   CT CHEST ABD PELV W CONT    Narrative CT of the Chest, Abdomen, and Pelvis with contrast    CLINICAL INDICATION:  Brain lesions, evaluate for metastatic disease. Patient  with left visual changes, low sodium, low potassium, elevated CEA. COMPARISON: Correlation with CT chest August 26, 2009    TECHNIQUE: Automated exposure Control was used. Multiple axial images were  obtained through the chest, abdomen, and pelvis,  after intravenous injection of  125cc of isovue 370 IV contrast to further evaluate vessels and organs. Coronal  reformatted images were done for further evaluation of bones and organs. Oral  contrast was given for further evaluation of GI tract and adjacent organs. FINDINGS:    Chest: The lungs demonstrate diffuse bilateral upper lobe predominant  emphysematous change. There is a hiatal hernia with nonspecific wall thickening  of the distal esophagus and intraluminal debris.  No evidence of pleural or  pericardial effusion. Scattered coronary artery calcification noted. The  ascending aorta is borderline dilated. No Thoracic lymphadenopathy. Abdomen: There are no focal lesions in the liver or spleen. Unremarkable  gallbladder. The adrenal glands and pancreas appear unremarkable. There is  normal enhancement of the kidneys; no hydronephrosis or renal masses evident. No lymphadenopathy. Major vessels are patent and of normal caliber. Scattered  atherosclerotic calcifications. No inflammatory changes, free air, or fluid collections in the abdomen. Tortuous  large bowel with extensive diverticulosis. GI tract limited in evaluation given  only a small amount of oral contrast on this exam, seen within stomach and some  of the small bowel. Normal appendix. Pelvis:  No inflammatory changes or fluid collections in the pelvis. Uterus is  small. No evidence of lymphadenopathy or mass. Bones: No acute or aggressive osseous lesion. Mild chronic degenerative changes  of spine. Partial visualization of right hip arthroplasty hardware causing  streak artifact and limiting detail of adjacent structures. Impression IMPRESSION:    1. No evidence of mass or lymphadenopathy. 2. Hiatal hernia, nonspecific distal esophagus wall thickening. 3. Atherosclerotic vascular disease. 4. Emphysema. 5. Diverticulosis. Most recent Echo  No results found for this visit on 12/21/19. Most recent lipid panels  Lab Results   Component Value Date/Time    Cholesterol, total 192 05/05/2016 08:25 AM    HDL Cholesterol 70 05/05/2016 08:25 AM    LDL, calculated 108 (H) 05/05/2016 08:25 AM    VLDL, calculated 14 05/05/2016 08:25 AM    Triglyceride 72 05/05/2016 08:25 AM       Most recent Hgb A1C  No results found for: HBA1C, HGBE8, HUY0AKOD, QBD7BQNF              Diagnoses and all orders for this visit:    1. Hemianopsia    2.  Brain tumor ([address]    Other orders  -     IP CONSULT TO NEUROLOGY; Standing  -     NIH STROKE SCALE ASSESSMENT; Standing  -     EKG, 12 LEAD, INITIAL; Standing  -     INITIATE NURSING DYSPHAGIA SCREENING; Standing  -     CBC WITH AUTOMATED DIFF; Standing  -     METABOLIC PANEL, BASIC; Standing  -     CT HEAD WO CONT; Standing  -     PROTHROMBIN TIME + INR; Standing  -     INITIAL PHYSICIAN ORDER: INPATIENT; Standing  -     aspirin delayed-release tablet 81 mg  -     acetaminophen (TYLENOL) tablet 500 mg  -     pantoprazole (PROTONIX) tablet 40 mg  -     magnesium oxide (MAG-OX) tablet 400 mg  -     polyethylene glycol (MIRALAX) packet 17 g  -     sodium chloride (NS) flush 5-40 mL  -     sodium chloride (NS) flush 5-40 mL  -     FULL CODE; Standing  -     VITAL SIGNS PER UNIT ROUTINE; Standing  -     AMBULATE WITH ASSISTANCE; Standing  -     NOTIFY PROVIDER: VITAL SIGNS CHANGES; Standing  -     PLEASE READ & DOCUMENT PPD TEST IN 24 HRS; Standing  -     PLEASE READ & DOCUMENT PPD TEST IN 48 HRS; Standing  -     PLEASE READ & DOCUMENT PPD TEST IN 72 HRS; Standing  -     tuberculin injection 5 Units  -     HYDROcodone-acetaminophen (NORCO) 5-325 mg per tablet 1 Tab  -     ondansetron (ZOFRAN) injection 4 mg  -     heparin (porcine) injection 5,000 Units  -     METABOLIC PANEL, BASIC; Standing  -     IP CONSULT TO PHYSICAL THERAPY; Standing  -     IP CONSULT TO OCCUPATIONAL THERAPY; Standing  -     IP CONSULT TO CASE MANAGEMENT; Standing  -     TSH 3RD GENERATION; Standing  -     T4, FREE; Standing  -     MRI BRAIN W WO CONT; Standing  -     CT CHEST ABD PELV W CONT; Standing  -     CEA; Standing  -     CALCITONIN; Standing  -     dexamethasone (DECADRON) 4 mg/mL injection 4 mg  -     levETIRAcetam (KEPPRA) tablet 500 mg  -     CANCER ANTIGEN 125; Standing  -     CANCER AG 19-9; Standing  -     DIET NPO; Standing  -     losartan/hydroCHLOROthiazide (HYZAAR) 100/25 mg  -     sodium chloride 0.9 % bolus infusion 100 mL  -     diatrizoate blanche-diatrizoat sod (MD-GASTROVIEW,GASTROGRAFIN) 66-10 % contrast solution 15 mL  -     iopamidol (ISOVUE-370) 76 % injection 100 mL  -     saline peripheral flush soln 10 mL  -     gadoterate meglumine (DOTAREM) 0.5 mmol/mL (376.9 mg/mL) contrast solution 13 mL  -     saline peripheral flush soln 10 mL      Impression    Embolic stroke right posterior cerebral artery  · Start ASA 81 mg daily   · Initiate high intensity statin   · Neurochecks Q4H  · MRI of brain  · CTA of head and neck   · Bedside swallow test   · Labs: A1c, FLP, TSH, Cardiac enzymes, BMP, CBC  · Telemetry and echocardiogram with bubble study  · PT/OT/ST  · DVT prophylaxis   · BP management under 277 systolic  · Smoking cessation if applicable   · Diabetes education if applicable   · Depression Screening prior to discharge  · I have stressed to the patient that it is mandatory that she consult an eye care professional and she must not drive until such time as she gets clearance from the eye care professional  · I have given her appropriate cues with reference to her continuing to be able to work on a computer.   Focusing on lines of print in a vertical fashion often works considerably better for hemianopia individuals and in order to do this she may need to adjust the settings on her computer or put her laptop at 90 degrees  · I do not see specific need for neurologic follow-up but am available if needed            Lloyd Valle MD

## 2019-12-22 NOTE — PROGRESS NOTES
Care Management Interventions  PCP Verified by CM:  Yes  Current Support Network: Relative's Home  The Plan for Transition of Care is Related to the Following Treatment Goals : Improve balance  The Patient and/or Patient Representative was Provided with a Choice of Provider and Agrees with the Discharge Plan?: Yes  Freedom of Choice List was Provided with Basic Dialogue that Supports the Patient's Individualized Plan of Care/Goals, Treatment Preferences and Shares the Quality Data Associated with the Providers?: Yes  Discharge Location  Discharge Placement: Home with home health

## 2019-12-22 NOTE — PROGRESS NOTES
Shift assessment complete. Respirations present. Even and unlabored. No s/s of distress. Zero c/o pain at this time. Call light within reach. Encouraged patient to call for assistance. Patient verbalizes understanding. See Doc Flowsheet for assessment details. Patient resting in bed. Both eyes reactive, and round. Still has blockage in view of vision. Ambulates to bathroom slowly holding on to items in room. Offered to assist her to bathroom . Refused help. States \" I'm just taking my time. \"   Voiding without difficulty. Alert and oriented times 4.

## 2019-12-22 NOTE — PROGRESS NOTES
Hospitalist Progress Note    2019  Admit Date: 2019 11:06 AM   NAME: Jeison Figueroa   :  1945   MRN:  [id number]   Attending: William Mahoney DO  PCP:  Ruslan Tejeda NP  HPI: 75year old CF with a PMH of HTN, COPD, and OA with left MCKENZIE in 2019 presented to the ER with one day of left sided vision blindness that resolved and then recurred. CT head showed possible brain masses likely mets. Admitted for further workup. The patient has also reported some ataxia/balance issues over the past month, but she thinks that it is due to her not fully being back to normal from her MCKENZIE in August.  PanCT and MRI brain did not show any masses. SUBJECTIVE:   : Feels better, relieved to find out that she does not have any masses. Says her vision has improved. Awaiting neurology consult. Nursing notes and chart reviewed. Review of Systems negative with exception of pertinent positives noted above. PHYSICAL EXAM     Visit Vitals  /80 (BP 1 Location: Right arm, BP Patient Position: Post activity)   Pulse (!) 58   Temp 97.7 °F (36.5 °C)   Resp 18   Ht 5' 3\" (1.6 m)   Wt 67.1 kg (148 lb)   SpO2 94%   BMI 26.22 kg/m²      Temp (24hrs), Av °F (36.7 °C), Min:97.7 °F (36.5 °C), Max:98.3 °F (36.8 °C)    Oxygen Therapy  O2 Sat (%): 94 % (19 0736)  Pulse via Oximetry: 58 beats per minute (19 1357)  O2 Device: Room air (19 1458)    Intake/Output Summary (Last 24 hours) at 2019 1044  Last data filed at 2019  Gross per 24 hour   Intake    Output 950 ml   Net -950 ml         General: No acute distress. Alert.    Head:  AT/NC  Lungs:  CTABL. Heart:  RRR, no murmur, rub, or gallop  Abdomen: Soft, non-distended, non-tender, +bs  Extremities: No cyanosis or clubbing. Neurologic:  No focal deficits. Moves all extremities. Skin:  No Obvious Rash  Psych:  Normal affect.      LABS AND STUDIES:  Personally reviewed all labs, meds, and studies for past 24hrs.      ASSESSMENT      Active Hospital Problems    Diagnosis Date Noted    Left homonymous hemianopsia 12/21/2019    Cerebellar mass 12/21/2019    Cerebral mass 12/21/2019    Ataxia 12/21/2019    GERD (gastroesophageal reflux disease)     Hyperlipemia     Hypertension            PLAN:  Brain masses:  -MRI brain did not show any masses. CT chest abdomen pelvis with contrast also negative for any mass. We will remove the diagnosis from her problem list.       Left homonymous hemianopsia (12/21/2019)  - Likely due to stroke. - Teleneuro consulted  - PT/OT       Ataxia (12/21/2019)  -Also likely due to stroke.  - PT/OT    Acute ischemic stroke:  -On aspirin, will check lipids and start on statins.       Hypertension ()  - BP Mildly elevated in ER  - Continue home meds       GERD (gastroesophageal reflux disease)   - Continue Protonix     Code Status: FULL CODE  DVT Prophylaxis: Heparin     Anticipated discharge: discharge to home in a.m. after echo is done    DVT ppx:  hsq  Discussed plan with pt who is in agreement. All questions answered.     Signed By: Jade Barron MD     December 22, 2019

## 2019-12-22 NOTE — PROGRESS NOTES
Problem: Falls - Risk of  Goal: *Absence of Falls  Description  Document Lemuel Rivera Fall Risk and appropriate interventions in the flowsheet.   Outcome: Progressing Towards Goal  Note: Fall Risk Interventions:  Mobility Interventions: Patient to call before getting OOB         Medication Interventions: Patient to call before getting OOB, Teach patient to arise slowly                   Problem: Patient Education: Go to Patient Education Activity  Goal: Patient/Family Education  Outcome: Progressing Towards Goal

## 2019-12-23 ENCOUNTER — HOME HEALTH ADMISSION (OUTPATIENT)
Dept: HOME HEALTH SERVICES | Facility: HOME HEALTH | Age: 74
End: 2019-12-23

## 2019-12-23 VITALS
WEIGHT: 148 LBS | TEMPERATURE: 98.3 F | OXYGEN SATURATION: 91 % | HEIGHT: 63 IN | RESPIRATION RATE: 19 BRPM | DIASTOLIC BLOOD PRESSURE: 68 MMHG | SYSTOLIC BLOOD PRESSURE: 133 MMHG | BODY MASS INDEX: 26.22 KG/M2 | HEART RATE: 53 BPM

## 2019-12-23 LAB
ANION GAP SERPL CALC-SCNC: 8 MMOL/L (ref 7–16)
BUN SERPL-MCNC: 16 MG/DL (ref 8–23)
CALCIUM SERPL-MCNC: 9.3 MG/DL (ref 8.3–10.4)
CANCER AG125 SERPL-ACNC: 11 U/ML (ref 1.5–35)
CANCER AG19-9 SERPL-ACNC: 5.6 U/ML (ref 2–37)
CHLORIDE SERPL-SCNC: 102 MMOL/L (ref 98–107)
CHOLEST SERPL-MCNC: 184 MG/DL
CO2 SERPL-SCNC: 29 MMOL/L (ref 21–32)
CREAT SERPL-MCNC: 0.74 MG/DL (ref 0.6–1)
GLUCOSE SERPL-MCNC: 135 MG/DL (ref 65–100)
HDLC SERPL-MCNC: 67 MG/DL (ref 40–60)
HDLC SERPL: 2.7 {RATIO}
LDLC SERPL CALC-MCNC: 101 MG/DL
LIPID PROFILE,FLP: ABNORMAL
POTASSIUM SERPL-SCNC: 4 MMOL/L (ref 3.5–5.1)
SODIUM SERPL-SCNC: 139 MMOL/L (ref 136–145)
TRIGL SERPL-MCNC: 80 MG/DL (ref 35–150)
VLDLC SERPL CALC-MCNC: 16 MG/DL (ref 6–23)

## 2019-12-23 PROCEDURE — 97530 THERAPEUTIC ACTIVITIES: CPT

## 2019-12-23 PROCEDURE — 80048 BASIC METABOLIC PNL TOTAL CA: CPT

## 2019-12-23 PROCEDURE — 80061 LIPID PANEL: CPT

## 2019-12-23 PROCEDURE — 93306 TTE W/DOPPLER COMPLETE: CPT

## 2019-12-23 PROCEDURE — 74011250637 HC RX REV CODE- 250/637: Performed by: INTERNAL MEDICINE

## 2019-12-23 PROCEDURE — 74011250637 HC RX REV CODE- 250/637: Performed by: HOSPITALIST

## 2019-12-23 PROCEDURE — 74011250636 HC RX REV CODE- 250/636: Performed by: HOSPITALIST

## 2019-12-23 PROCEDURE — 97535 SELF CARE MNGMENT TRAINING: CPT

## 2019-12-23 PROCEDURE — 36415 COLL VENOUS BLD VENIPUNCTURE: CPT

## 2019-12-23 RX ORDER — ASPIRIN 81 MG/1
81 TABLET ORAL EVERY 12 HOURS
Qty: 90 TAB | Refills: 0 | Status: SHIPPED | OUTPATIENT
Start: 2019-12-23 | End: 2020-04-29 | Stop reason: SDUPTHER

## 2019-12-23 RX ORDER — ENOXAPARIN SODIUM 100 MG/ML
40 INJECTION SUBCUTANEOUS EVERY 24 HOURS
Status: DISCONTINUED | OUTPATIENT
Start: 2019-12-23 | End: 2019-12-23 | Stop reason: HOSPADM

## 2019-12-23 RX ORDER — ATORVASTATIN CALCIUM 40 MG/1
80 TABLET, FILM COATED ORAL DAILY
Status: DISCONTINUED | OUTPATIENT
Start: 2019-12-23 | End: 2019-12-23 | Stop reason: HOSPADM

## 2019-12-23 RX ORDER — CLONIDINE HYDROCHLORIDE 0.1 MG/1
0.2 TABLET ORAL
Status: DISCONTINUED | OUTPATIENT
Start: 2019-12-23 | End: 2019-12-23 | Stop reason: HOSPADM

## 2019-12-23 RX ORDER — LOSARTAN POTASSIUM 100 MG/1
100 TABLET ORAL DAILY
Qty: 90 TAB | Refills: 0 | Status: SHIPPED | OUTPATIENT
Start: 2019-12-24 | End: 2020-04-29 | Stop reason: SDUPTHER

## 2019-12-23 RX ORDER — ATORVASTATIN CALCIUM 40 MG/1
40 TABLET, FILM COATED ORAL DAILY
Qty: 90 TAB | Refills: 0 | Status: SHIPPED | OUTPATIENT
Start: 2019-12-24 | End: 2020-04-29

## 2019-12-23 RX ORDER — LOSARTAN POTASSIUM 50 MG/1
100 TABLET ORAL DAILY
Status: DISCONTINUED | OUTPATIENT
Start: 2019-12-23 | End: 2019-12-23 | Stop reason: HOSPADM

## 2019-12-23 RX ORDER — POLYETHYLENE GLYCOL 3350 17 G/17G
17 POWDER, FOR SOLUTION ORAL
Qty: 1 PACKET | Refills: 0 | Status: SHIPPED
Start: 2019-12-23 | End: 2020-04-29

## 2019-12-23 RX ADMIN — ASPIRIN 81 MG: 81 TABLET, COATED ORAL at 09:02

## 2019-12-23 RX ADMIN — LOSARTAN POTASSIUM 100 MG: 50 TABLET, FILM COATED ORAL at 09:02

## 2019-12-23 RX ADMIN — PANTOPRAZOLE SODIUM 40 MG: 40 TABLET, DELAYED RELEASE ORAL at 09:02

## 2019-12-23 RX ADMIN — ENOXAPARIN SODIUM 40 MG: 40 INJECTION SUBCUTANEOUS at 09:02

## 2019-12-23 RX ADMIN — ATORVASTATIN CALCIUM 80 MG: 40 TABLET, FILM COATED ORAL at 09:02

## 2019-12-23 RX ADMIN — Medication 5 ML: at 06:00

## 2019-12-23 NOTE — PROGRESS NOTES
Problem: Falls - Risk of  Goal: *Absence of Falls  Description  Document Sejal Kulwinder Fall Risk and appropriate interventions in the flowsheet.   Outcome: Progressing Towards Goal  Note: Fall Risk Interventions:  Mobility Interventions: Patient to call before getting OOB         Medication Interventions: Patient to call before getting OOB                   Problem: Patient Education: Go to Patient Education Activity  Goal: Patient/Family Education  Outcome: Progressing Towards Goal     Problem: Patient Education: Go to Patient Education Activity  Goal: Patient/Family Education  Outcome: Progressing Towards Goal     Problem: Patient Education: Go to Patient Education Activity  Goal: Patient/Family Education  Outcome: Progressing Towards Goal

## 2019-12-23 NOTE — DISCHARGE SUMMARY
Physician Discharge Summary     Patient ID:  Gerlean Cabot  [id number]  17 y.o.  1945    Admit date: 12/21/2019    Discharge date: 12-    Diagnosis:  1- Acute ischemic stroke, suspected to be embolic based on CT findings below but MRI showed sole R occipital infarct. Seen by Neurologist, PT/OT/ST. No events on telemetry. Started on Aspirin. Need follow up CT in 4 weeks. 2- Hypertension, controlled  3- Hyperlipidemia, , Tg 80, Chol 184, HDL 67, started on statin. MRI brain:  There is restricted diffusion involving the posterior right temporal lobe and  extending into the right occipital lobe. This involves the deep but not the  peripheral cortex. There is associated T2 hyperintensity. There is no associated  abnormal postcontrast enhancement. There are numerous chronic lacunar infarctions in the cerebellar hemispheres  bilaterally. Prior left lens replacement. The expected large intracranial vascular flow voids are preserved. There is no  evidence of intracranial blood products. The ventricles are within normal limits for the mild degree of global brain  parenchymal volume loss. There is no midline shift. The basilar cisterns are  patent. There is no cerebellar tonsillar ectopia or herniation. There are T2 hyperintensities in the periventricular and subcortical white  matter, a nonspecific finding which likely represents chronic microangiopathy. There is no abnormal intra or extra-axial postcontrast enhancement. There are no suspicious osseous lesions. IMPRESSION:  1. There is a large focus of restricted diffusion and T2 hyperintensity  involving the deep cortex and white matter of the right occipital lobe. There is  no associated postcontrast enhancement or blood products. This most likely  represents an acute or early subacute infarction. Follow-up to resolution is  recommended to exclude an underlying process.   2. No evidence of intracranial mass lesion or metastatic disease. 3. Mild to moderate burden of chronic microangiopathy. CT head w/ out contrast:  1. No hemorrhage. 2. Multiple lesions involving the cerebellum and cerebrum. The appearance is  suspicious for metastatic disease, possibly with associated infarcts. Echo:  -  Left ventricle: Systolic function was normal. Ejection fraction was  estimated in the range of 55 % to 60 %. There were no regional wall motion  abnormalities. Wall thickness was at the upper limits of normal.  -  Left atrium: The atrium was moderately dilated. -  Right atrium: The atrium was moderately dilated. -  Inferior vena cava, hepatic veins: The respirophasic change in diameter   was  less than 50%. -  Aortic valve: There was mild to moderate regurgitation.  -  Mitral valve: There was mild regurgitation.  -  Tricuspid valve: There was mild regurgitation. Hospital course:   75year old CF with a PMH of HTN, COPD, and OA with left MCKENZIE in 8/2019 presented to the ER with one day of left sided vision blindness that resolved and then recurred. CT head showed possible brain masses likely mets. Admitted for further workup. The patient has also reported some ataxia/balance issues over the past month, but she thinks that it is due to her not fully being back to normal from her MCKENZIE in August.  PanCT and MRI brain did not show any masses. Patient admitted and treated as above. On day of discharge, patient exam showed functional female w/ minimal visual erwin defect. PCP: Bernardo Runner, NP    Patient Instructions:   Current Discharge Medication List      START taking these medications    Details   atorvastatin (LIPITOR) 40 mg tablet Take 1 Tab by mouth daily. Qty: 90 Tab, Refills: 0      losartan (COZAAR) 100 mg tablet Take 1 Tab by mouth daily.   Qty: 90 Tab, Refills: 0         CONTINUE these medications which have CHANGED    Details   polyethylene glycol (MIRALAX) 17 gram packet Take 1 Packet by mouth daily as needed for Other (constipation). Mix with 8 oz water  Qty: 1 Packet, Refills: 0      aspirin delayed-release 81 mg tablet Take 1 Tab by mouth every twelve (12) hours every twelve (12) hours. Qty: 90 Tab, Refills: 0         CONTINUE these medications which have NOT CHANGED    Details   acetaminophen (TYLENOL) 500 mg tablet Take 500 mg by mouth every six (6) hours as needed for Pain.      esomeprazole (NEXIUM) 40 mg capsule Take 40 mg by mouth daily as needed (GERD). magnesium oxide (MAG-OX) 400 mg tablet Take 500 mg by mouth nightly. STOP taking these medications       potassium 99 mg tablet Comments:   Reason for Stopping:         losartan-hydrochlorothiazide (HYZAAR) 100-25 mg per tablet Comments:   Reason for Stopping:               Instructions:     Check CT head w/ out contrast in 4 weeks    Diet:  Low salt, low fat    Activity: As tolerated. PT/ OT recommendations. Fall precautions. Condition: Stable  Follow-up with primary physician in 1-2 week. Time 35 min    Please send copy to primary physician.     Signed:  Kenji Polanco MD  12/23/2019  12:54 PM

## 2019-12-23 NOTE — PROGRESS NOTES
Problem: Self Care Deficits Care Plan (Adult)  Goal: *Acute Goals and Plan of Care (Insert Text)  Description  1. Patient will perform grooming with supervision. GOAL MET 12/23/2019  2. Patient will perform Upper body dressing with supervision. GOAL MET 12/23/2019  3. Patient will perform lower body dressing with supervision. GOAL MET 12/23/2019  4. Patient will perform upper and lower body bathing with supervision. GOAL MET 12/23/2019  5. Patient will perform toilet transfers with SBA. GOAL MET 12/23/2019  6. Patient will perform shower transfer with SBA. GOAL MET 12/23/2019  7. Patient will participate in 30 + minutes of ADL/ therapeutic exercise/therapeutic activity with min rest breaks to increase activity tolerance for self care. 8. Patient will perform ADL functional mobility in room with SBA. GOAL MET 12/23/2019  9. Patient will demonstrate knowledge of visual compensatory skills tasks with min cues. GOAL MET 12/23/2019    Goals to be achieved in 7 days. Outcome: Progressing Towards Goal     OCCUPATIONAL THERAPY: Daily Note, Discharge and AM 12/23/2019  INPATIENT: OT Visit Days: 2  Payor: Miesha Gallego / Plan: [address] [place] [id number] HMO / Product Type: app[employer] Medicare /      NAME/AGE/GENDER: Louanna Denver is a 75 y.o. female   PRIMARY DIAGNOSIS:  Left homonymous hemianopsia [H53.462]  Cerebral mass [G93.89]  Cerebellar mass [G93.89] Acute ischemic stroke ([facility] [address] Acute ischemic stroke ([facility] [address]       ICD-10: Treatment Diagnosis:    · Difficulty in walking, Not elsewhere classified (R26.2)  · Other abnormalities of gait and mobility (R26.89)   Precautions/Allergies:     Patient has no known allergies. ASSESSMENT:     Ms. Maegan Bowen presents supine in bed agreeable to evaluation. She is supervision supine to sit and SBA sit to stand, CGA for transfers and mobility with RW. She has vision loss in which she is trying to compensate for. OT educated on compensatory techniques to use.   She has decreased standing balance and had her hip replaced earlier this year. She is very fearful of falling on her new hip. She reported she has to take her time with mobility to ensure she doesn't fall. She has a two level home and she works in commercial real estate./  Her sister lives with her but the sister is on chronic o2. She would benefit from skilled OT will follow. 12/23/19 905am Patient is up and dressed in her room. She reported she gave herself a sponge bath, dressed and brushed her teeth on her own this am.  She is walking around her room by herself. She appears much more confident and steady today. She is compensating well for her vision loss. She is ambulating at a slower pace with more caution with no device and SBA. OT educated on compensatory techniques like scanning which she is all ready doing. She has been able to read and text on her phone with additional time. She plans to return home with her sister who lives with her. She has met her goals and no further acute OT services are indicated. Recommended Home health OT for safety. Will discharge OT. This section established at most recent assessment   PROBLEM LIST (Impairments causing functional limitations):  1. Decreased ADL/Functional Activities  2. Decreased Transfer Abilities  3. Decreased Ambulation Ability/Technique  4. Decreased Balance  5. Decreased vision skills    INTERVENTIONS PLANNED: (Benefits and precautions of occupational therapy have been discussed with the patient.)  1. Activities of daily living training  2. Adaptive equipment training  3. Balance training  4. Clothing management  5. Donning&doffing training  6. Neuromuscular re-eduation  7. Therapeutic activity  8. Therapeutic exercise  9. TREATMENT PLAN: Frequency/Duration: Follow patient 3 times to address above goals. Rehabilitation Potential For Stated Goals: Good     REHAB RECOMMENDATIONS (at time of discharge pending progress):    Placement:   It is my opinion, based on this patient's performance to date, that Ms. Patricia Miles may benefit from participating in 1-2 additional therapy sessions in order to continue to assess for rehab potential and then make recommendation for disposition at discharge. Equipment:    None at this time              OCCUPATIONAL PROFILE AND HISTORY:   History of Present Injury/Illness (Reason for Referral):  See h and P  Past Medical History/Comorbidities:   Ms. Patricia Miles  has a past medical history of Aortic atherosclerosis ([facility] [address] Arthritis, Ascending aortic aneurysm ([facility] [address] Chronic airway obstruction, not elsewhere classified, GERD (gastroesophageal reflux disease), Hiatal hernia, Hyperlipemia, Hypertension, Osteopenia, and Vitamin D deficiency. Ms. Patricia Miles  has a past surgical history that includes hx carpal tunnel release (Bilateral, 2004, 2005); hx tonsillectomy; hx cataract removal (07/10/2019); and hx tubal ligation. Social History/Living Environment:   Home Environment: Private residence  One/Two Story Residence: Two story  Living Alone: No  Support Systems: Family member(s), Friends \ neighbors  Patient Expects to be Discharged to[de-identified] Private residence  Current DME Used/Available at Home: Sherral Meline, straight  Tub or Shower Type: Tub/Shower combination  Prior Level of Function/Work/Activity:  Mod I with ADLs     Number of Personal Factors/Comorbidities that affect the Plan of Care: Brief history (0):  LOW COMPLEXITY   ASSESSMENT OF OCCUPATIONAL PERFORMANCE[de-identified]   Activities of Daily Living:   Basic ADLs (From Assessment) Complex ADLs (From Assessment)   Feeding: Independent  Oral Facial Hygiene/Grooming: Supervision  Bathing: Stand-by assistance  Upper Body Dressing: Supervision  Lower Body Dressing: Stand-by assistance  Toileting: Stand by assistance     Grooming/Bathing/Dressing Activities of Daily Living     Cognitive Retraining  Safety/Judgement: Awareness of environment; Fall prevention                             Most Recent Physical Functioning:   Gross Assessment:                  Posture:     Balance:  Sitting: Intact  Standing: Without support Bed Mobility:     Wheelchair Mobility:     Transfers:               Patient Vitals for the past 6 hrs:   BP BP Patient Position SpO2 Pulse   19 0808 140/77 At rest 94 % 66       Mental Status  Neurologic State: Alert, Appropriate for age  Orientation Level: Appropriate for age  Cognition: Appropriate decision making, Appropriate for age attention/concentration, Appropriate safety awareness, Follows commands  Perception: Appears intact  Perseveration: No perseveration noted  Safety/Judgement: Awareness of environment, Fall prevention                          Physical Skills Involved:  1. Balance Cognitive Skills Affected (resulting in the inability to perform in a timely and safe manner):  1. [facility]  Psychosocial Skills Affected:  1. Habits/Routines  2. Self-Awareness   Number of elements that affect the Plan of Care: 1-3:  LOW COMPLEXITY   CLINICAL DECISION MAKIN[address] [place] [id number] AM-PAC 6 Clicks   Daily Activity Inpatient Short Form  How much help from another person does the patient currently need. .. Total A Lot A Little None   1. Putting on and taking off regular lower body clothing? [] 1   [] 2   [x] 3   [] 4   2. Bathing (including washing, rinsing, drying)? [] 1   [] 2   [x] 3   [] 4   3. Toileting, which includes using toilet, bedpan or urinal?   [] 1   [] 2   [x] 3   [] 4   4. Putting on and taking off regular upper body clothing? [] 1   [] 2   [] 3   [x] 4   5. Taking care of personal grooming such as brushing teeth? [] 1   [] 2   [] 3   [x] 4   6. Eating meals? [] 1   [] 2   [] 3   [x] 4   © , Trustees of [address] [place] [address], under license to [employer]. All rights reserved      Score:  Initial:21 Most Recent: X (Date: -- )    Interpretation of Tool:  Represents activities that are increasingly more difficult (i.e. Bed mobility, Transfers, Gait).     Medical Necessity:     · Patient is expected to demonstrate progress in   · Self care skills and functional mobility  ·     Reason for Services/Other Comments:  · Patient continues to require skilled intervention due to   · Above listed deficits     Use of outcome tool(s) and clinical judgement create a POC that gives a: LOW COMPLEXITY         TREATMENT:   (In addition to Assessment/Re-Assessment sessions the following treatments were rendered)     Pre-treatment Symptoms/Complaints:    Pain: Initial:   Pain Intensity 1: 0  Post Session:  0/10     Self Care: (20): Procedure(s) (per grid) utilized to improve and/or restore self-care/home management as related to dressing, bathing, toileting and grooming. Required minimal verbal and   cueing to facilitate activities of daily living skills and compensatory activities. Braces/Orthotics/Lines/Etc:   · O2 Device: Room air  Treatment/Session Assessment:    · Response to Treatment:   Doing much better no further acute care OT services indicated. · Interdisciplinary Collaboration:   o Physical Therapist  o Occupational Therapist  o Registered Nurse  o   · After treatment position/precautions:   o standing in her room holding her phone   · Compliance with Program/Exercises: Compliant all of the time.   Discharge Acute care OT services  Total Treatment Duration:20  OT Patient Time In/Time Out  Time In: 9636  Time Out: [address] [facility], OT

## 2019-12-23 NOTE — PROGRESS NOTES
Discharge instructions reviewed with patient. Opportunity for questions given. Rx given for ASA, Lipitor, and Cozaar. Pt able to leave once ride arrives.

## 2019-12-23 NOTE — PROGRESS NOTES
Pt assessment completed. Alert and oriented. Lungs CTA Even and unlabored. Heart sounds regular. Abdomen soft and non tender with active bowel sounds. IV present with no s/sx of complications. Heart sounds regular. Pt denies pain needs at this time.  All needs met

## 2019-12-23 NOTE — PROGRESS NOTES
[address]  Face to Face Encounter    Patients Name: Ramon Hansen    YOB: 1945    Ordering Physician: Mayank Davidson MD     Primary Diagnosis: Left homonymous hemianopsia [H53.462]  Cerebral mass [G93.89]  Cerebellar mass [G93.89]    Date of Face to Face:   12/23/2019                                  Face to Face Encounter findings are related to primary reason for home care:   yes. 1. I certify that the patient needs intermittent care as follows: physical therapy: strengthening  occupational therapy:  ADL safety (ie. cooking, bathing, dressing)    2. I certify that this patient is homebound, that is: Patient's condition makes leaving the home medically contraindicated; and patient has a normal inability to leave the home and leaving the home requires considerable and taxing effort. Patient may leave the home for infrequent and short duration for medical reasons, and occasional absences for non-medical reasons. Homebound status is due to the following functional limitations:     3. I certify that this patient is under my care and that I, or a nurse practitioner or  [id number], or clinical nurse specialist, or certified nurse midwife, working with me, had a Face-to-Face Encounter that meets the physician Face-to-Face Encounter requirements. The following are the clinical findings from the [address] [place] [address] encounter that support the need for skilled services and is a summary of the encounter:     See Progress notes       Michelle Ballard RN  12/23/2019      THE FOLLOWING TO BE COMPLETED BY THE COMMUNITY PHYSICIAN:    I concur with the findings described above from the [facility] encounter that this patient is homebound and in need of a skilled service.     Certifying Physician: _____________________________________      Printed Certifying Physician Name: _____________________________________    Date: _________________

## 2019-12-23 NOTE — PROGRESS NOTES
Problem: Mobility Impaired (Adult and Pediatric)  Goal: *Acute Goals and Plan of Care (Insert Text)  Description    LTG:  (1.)Ms. Rey Ibarra will move from supine to sit and sit to supine  in bed with INDEPENDENT within 7 treatment day(s). (2.)Ms. Rey Ibarra will transfer from bed to chair and chair to bed with SUPERVISION using the least restrictive device within 7 treatment day(s). (3.)Ms. Rye Ibarra will ambulate with SUPERVISION for 300 feet with the least restrictive device within 7 treatment day(s). (4)Ms. Rey Ibarra will perform hep with cues and assistance in 7 days. (5)Ms. Rey Ibarra will go up a flight of stairs with rail CGA in 7 days. ________________________________________________________________________________________________     Outcome: Progressing Towards Goal     PHYSICAL THERAPY: Daily Note and AM 12/23/2019  INPATIENT: PT Visit Days : 2  Payor: Donnell Curling / Plan: [address] [place] [address] HMO / Product Type: [employer] /       NAME/AGE/GENDER: Zeeshan Lee is a 75 y.o. female   PRIMARY DIAGNOSIS: Left homonymous hemianopsia [H53.462]  Cerebral mass [G93.89]  Cerebellar mass [G93.89] Acute ischemic stroke ([facility] [address] [address] Acute ischemic stroke ([facility] [address]       ICD-10: Treatment Diagnosis:    · Other abnormalities of gait and mobility (R26.89)   Precaution/Allergies:  Patient has no known allergies. ASSESSMENT:     Ms. Rey Ibarra presents with decreased left vision and decreased balance. She is normally independent with all mobility and works. Her symptoms began 2 days ago. She ambulated down the elaine but needs hand held assist as her balance is decreased from normal.  Her left eye vision is altered. She has limited higher level balance as well. We discussed safety and her being at risk for falling. He needs UE assistance for support when she is up. She would benefit from at least a cane and maybe a walker. Will follow.  Stressed to her not to get up on her own.  12/23/19:  Patient moving well today but still having vision issues limiting her higher level balance skills. Got herself up and dressed and planning on leaving today. Attempted gait with cane but ended up carrying instead of using purposefully. Preferred using finger on rail or wall occasionally for proprioception. Patient very emotional and upset with her health/current situation. Refuses to ask family for assist .  Offered  but she declined. Spoke with SW. This section established at most recent assessment   PROBLEM LIST (Impairments causing functional limitations):  1. Decreased Strength  2. Decreased ADL/Functional Activities  3. Decreased Transfer Abilities  4. Decreased Ambulation Ability/Technique  5. Decreased Balance  6. Decreased Flexibility/Joint Mobility  7. Decreased [place] with Home Exercise Program   INTERVENTIONS PLANNED: (Benefits and precautions of physical therapy have been discussed with the patient.)  1. Balance Exercise  2. Bed Mobility  3. Home Exercise Program (HEP)  4. Neuromuscular Re-education/Strengthening  5. Therapeutic Activites  6. Therapeutic Exercise/Strengthening  7. Transfer Training     TREATMENT PLAN: Frequency/Duration: daily for duration of hospital stay  Rehabilitation Potential For Stated Goals: [facility] (at time of discharge pending progress):    Placement:  HHPT? Equipment:    May need a RW               HISTORY:   History of Present Injury/Illness (Reason for Referral):  Nat Souza is a 75year old CF with a PMH of HTN, COPD, and OA with left MCKENZIE in 8/2019 presented to the ER with one day of left sided vision blindness. She states that she was in her office the day before and she noticed that she could see her right hand but not her left hand if they were held out in front of her. This lasted approximately 1.5 hours then improved. She then woke up the morning of admission and noticed that the left sided blindness had returned.  When it continued for a few hours, she decided to come to the ER. Upon further questioning, the patient has also be having some ataxia/balance issues over the past month, but she thinks that it is due to her not fully being back to normal from her MCKENZIE in August. She denies CP/SOB/cough. No swallowing difficulties. No F/C/night sweats. No N/V/D/change in stools. No dysuria. No menstural bleeding. Past Medical History/Comorbidities:   Ms. Eusebio Goodwin  has a past medical history of Aortic atherosclerosis ([address] Arthritis, Ascending aortic aneurysm ([address] Chronic airway obstruction, not elsewhere classified, GERD (gastroesophageal reflux disease), Hiatal hernia, Hyperlipemia, Hypertension, Osteopenia, and Vitamin D deficiency. Ms. Eusebio Goodwin  has a past surgical history that includes hx carpal tunnel release (Bilateral, 2004, 2005); hx tonsillectomy; hx cataract removal (07/10/2019); and hx tubal ligation. Social History/Living Environment:   Home Environment: Private residence  One/Two Story Residence: Two story  Living Alone: No  Support Systems: Family member(s), Friends \ neighbors  Patient Expects to be Discharged to[de-identified] Private residence  Current DME Used/Available at Home: [place], straight  Tub or Shower Type: Tub/Shower combination  Prior Level of Function/Work/Activity:  Independent, works     Number of Personal Factors/Comorbidities that affect the Plan of Care: 1-2: MODERATE COMPLEXITY   EXAMINATION:   Most Recent Physical Functioning:   Gross Assessment:  AROM: Within functional limits  Strength:  Within functional limits               Posture:     Balance:  Sitting: Intact  Standing - Static: Good  Standing - Dynamic : Fair Bed Mobility:     Wheelchair Mobility:     Transfers:  Sit to Stand: Supervision  Stand to Sit: Supervision  Bed to Chair: Stand-by assistance  Gait:     Base of Support: Center of gravity altered  Speed/Prabha: Pace decreased (<100 feet/min)  Step Length: Left shortened;Right shortened  Gait Abnormalities: Path deviations  Distance (ft): 300 Feet (ft)  Assistive Device: (occasional use of finger on rail/wall; didn't like cane)  Ambulation - Level of Assistance: Stand-by assistance  Number of Stairs Trained: 3  Stairs - Level of Assistance: Stand-by assistance  Rail Use: Both  Interventions: Safety awareness training;Verbal cues      Body Structures Involved:  1. Bones  2. Joints  3. Muscles  4. Ligaments Body Functions Affected:  1. Movement Related Activities and Participation Affected:  1. Mobility   Number of elements that affect the Plan of Care: 3: MODERATE COMPLEXITY   CLINICAL PRESENTATION:   Presentation: Stable and uncomplicated: LOW COMPLEXITY   CLINICAL DECISION MAKIN[address] [place] [id number] AM-PAC 6 Clicks   Basic Mobility Inpatient Short Form  How much difficulty does the patient currently have. .. Unable A Lot A Little None   1. Turning over in bed (including adjusting bedclothes, sheets and blankets)? [] 1   [] 2   [x] 3   [] 4   2. Sitting down on and standing up from a chair with arms ( e.g., wheelchair, bedside commode, etc.)   [] 1   [] 2   [x] 3   [] 4   3. Moving from lying on back to sitting on the side of the bed? [] 1   [] 2   [x] 3   [] 4   How much help from another person does the patient currently need. .. Total A Lot A Little None   4. Moving to and from a bed to a chair (including a wheelchair)? [] 1   [] 2   [x] 3   [] 4   5. Need to walk in hospital room? [] 1   [] 2   [x] 3   [] 4   6. Climbing 3-5 steps with a railing? [] 1   [] 2   [x] 3   [] 4   © , Trustees of [address] [place] [address], under license to [employer]. All rights reserved      Score:  Initial: 18 Most Recent: X (Date: -- )    Interpretation of Tool:  Represents activities that are increasingly more difficult (i.e. Bed mobility, Transfers, Gait).     Medical Necessity:     · Patient is expected to demonstrate progress in   · strength, range of motion, and balance  ·  to   · decrease assistance required with exercises and functional mobility   · . Reason for Services/Other Comments:  · Patient   · continues to require present interventions due to patient's inability to perform exercises and functional mobility independently   · . Use of outcome tool(s) and clinical judgement create a POC that gives a: Clear prediction of patient's progress: LOW COMPLEXITY            TREATMENT:   (In addition to Assessment/Re-Assessment sessions the following treatments were rendered)   Pre-treatment Symptoms/Complaints:  Patient planning on leaving today. Pain: Initial:   Pain Intensity 1: 0  Post Session:  0     Therapeutic Activity: (    25 minutes): Therapeutic activities including Bed transfers, Chair transfers, Ambulation on level ground and Stairs to improve mobility, strength, balance and coordination. Required minimal Safety awareness training;Verbal cues to promote dynamic balance in standing. Braces/Orthotics/Lines/Etc:   · O2 Device: Room air  Treatment/Session Assessment:    · Response to Treatment:  Seems to be moving better today but still having vision issues. · Interdisciplinary Collaboration:   o Physical Therapist  o Registered Nurse  o   · After treatment position/precautions:   o Bed/Chair-wheels locked  o Bed in low position  o Call light within reach  o Nurse at bedside  o sitting edge of bed   · Compliance with Program/Exercises: Will assess as treatment progresses  · Recommendations/Intent for next treatment session: \"Next visit will focus on advancements to more challenging activities and reduction in assistance provided\".   Total Treatment Duration:  PT Patient Time In/Time Out  Time In: 0840  Time Out: [facility]

## 2019-12-24 ENCOUNTER — PATIENT OUTREACH (OUTPATIENT)
Dept: CASE MANAGEMENT | Age: 74
End: 2019-12-24

## 2019-12-24 LAB — CALCIT SERPL-MCNC: <2 PG/ML (ref 0–5)

## 2019-12-24 NOTE — PROGRESS NOTES
This note will not be viewable in [address] Transition of Care Discharge Follow-up Questionnaire   Date/Time of Call:   12/24/19   1048am   What was the patient hospitalized for? Acute ischemic stroke     Does the patient understand his/her diagnosis and/or treatment and what happened during the hospitalization? Yes, spoke with patient, she states understanding of diagnosis and treatment; and is agreeable to call. Patient states she is doing well, my eye sight is coming back faster than I expected so I am pleased with that   Did the patient receive discharge instructions? Yes    CM Assessed Risk for Readmission:       Patient stated Risk for Readmission:      Low/Moderate r/t diagnosis and/or comorbidities     None stated   Review any discharge instructions (see discharge instructions/AVS in [facility]). Ask patient if they understand these. Do they have any questions? Reviewed, understanding is stated, no questions at this time       Were home services ordered (nursing, PT, OT, ST, etc.)? Yes, [facility] PT/OT       If so, has the first visit occurred? If not, why? (Assist with coordination of services if necessary.)   Patient is waiting on call to schedule. She is aware may take 24 to 48 hours from discharge. Was any DME ordered? No  Patient has cane   If so, has it been received? If not, why?  (Assist patient in obtaining DME orders &/or equipment if necessary.) N/A    Complete a review of all medications (new, continued and discontinued meds per the D/C instructions and medication tab in [facility] Completed  START taking:  atorvastatin 40 mg tablet (LIPITOR)  Start taking on: December 24, 2019  losartan 100 mg tablet (COZAAR)  Start taking on: December 24, 2019  CHANGE how you take:  polyethylene glycol 17 gram packet (MIRALAX)  STOP taking:  losartan-hydroCHLOROthiazide 100-25 mg per  tablet (HYZAAR)  potassium 99 mg tablet   Were all new prescriptions filled?   If not, why?  (Assist patient in obtaining medications if necessary  escalate for CCM &/or SW if ongoing issues are verbalized by pt or anticipated)   Yes    Does the patient understand the purpose and dosing instructions for all medications? (If patient has questions, provide explanation and education.)   Yes     Does the patient have any problems in performing ADLs? (If patient is unable to perform ADLs  what is the limiting factor(s)? Do they have a support system that can assist? If no support system is present, discuss possible assistance that they may be able to obtain. Escalate for CCM/SW if ongoing issues are verbalized by pt or anticipated)   Independent with ADLs, lives with her sister         Does the patient have all follow-up appointments scheduled? 7 day f/up with PCP?   (f/up with PCP may be w/in 14 days if patient has a f/up with their specialist w/in 7 days)    7-14 day f/up with specialist?   (or per discharge instructions)    If f/up has not been made  what actions has the care coordinator made to accomplish this? Has transportation been arranged? Yes        Stacy Toussaint NP 12/30/19                      Yes, no transportation needs are identified at this time   Any other questions or concerns expressed by the patient? No other needs or concerns identified. Patient states her gratitude for follow up. Contact information for Care Coordinator was given, instructed to call with new questions or concerns. Schedule next appointment with AZUCENA Hagen or refer to RN Case Manager/ per the workflow guidelines. When is care coordinators next follow-up call scheduled? If referred for CCM  what RN care manager was the referral assigned? Care Coordinator will follow per workflow guidelines.           Within 14 days   VERONIQUE Call Completed By: Ary Paez LPN  Care Coordinator

## 2019-12-24 NOTE — PROGRESS NOTES
This note will not be viewable in [address] Initial VERONIQUE outreach attempt to patient's home/mobile number was unsuccessful. Left message to return call. Will attempt second outreach within 24 hours.

## 2020-01-07 ENCOUNTER — PATIENT OUTREACH (OUTPATIENT)
Dept: CASE MANAGEMENT | Age: 75
End: 2020-01-07

## 2020-01-07 NOTE — PROGRESS NOTES
This note will not be viewable in [address] Transitions of Care  Follow up Outreach Note   Outreach type Phone call: spoke with patient  Home visit:   Date/Time of Outreach: 1/7/2020 255pm     Has patient attended PCP or specialist follow-up appointments since last contact? What was outcome of appointment? When is next follow-up scheduled? Patient states she is doing well. Patient attended follow up as scheduled. Review medications. Any medication changes since last outreach? Does patient have any questions or issues related to their medications? No changes stated. Patient reports some muscle weakness and dizziness since starting Lipitor, states I will give my body time to adjust, but will discuss at next doctor visit if I need to . Home health active? If yes  any issue? Progress? No, patient declined need for service per [facility] documentation     Referrals needed?  (CM, SW, HH, etc. )   No.      Other issues/Miscellaneous? (Transportation, access to meals, ability to perform ADLs, adequate caregiver support, etc.) No other needs or concerns at this time. Patient states her gratitude for follow up. Next Outreach Scheduled?     Graduation from program?   N/A    Yes       Next Steps/Goals (if applicable):   N/A     Outreach completed by:   Mirza Miguel LPN  Care Coordinator

## 2020-02-14 PROBLEM — Z96.641 STATUS POST TOTAL REPLACEMENT OF RIGHT HIP: Status: ACTIVE | Noted: 2020-02-14

## 2020-02-14 PROBLEM — M54.50 LOW BACK PAIN: Status: ACTIVE | Noted: 2020-02-14

## 2020-02-14 PROBLEM — M16.11 UNILATERAL PRIMARY OSTEOARTHRITIS, RIGHT HIP: Status: ACTIVE | Noted: 2020-02-14

## 2020-03-07 NOTE — PROGRESS NOTES
Problem: Safety  Goal: Will remain free from injury  Outcome: PROGRESSING AS EXPECTED  Note: Father at bedside, crib rails up, call light and belongings within reach, pt's father call for assistance appropriately      Problem: Knowledge Deficit  Goal: Knowledge of disease process/condition, treatment plan, diagnostic tests, and medications will improve  Outcome: PROGRESSING AS EXPECTED  Note: Educated on POC, all questions answered, hourly rounding in progress     Problem: Pain Management  Goal: Pain level will decrease to patient's comfort goal  Outcome: PROGRESSING AS EXPECTED  Note: VSS, no s/s of distress, will continue to monitor     Problem: Respiratory:  Goal: Respiratory status will improve  Outcome: PROGRESSING AS EXPECTED  Note: Titrated oxygen to 0.60LO2 with SPO2 of 91-93%,  in use, suctioned as needed, hourly rounding in progress      Care Management Interventions  PCP Verified by CM: Yes  Palliative Care Criteria Met (RRAT>21 & CHF Dx)?: No(RRAT 17 Dx Cerebelar Mass)  Transition of Care Consult (CM Consult): Discharge Planning, [address]: Yes  Discharge Durable Medical Equipment: No(has straight cane)  Physical Therapy Consult: Yes  Occupational Therapy Consult: Yes  Speech Therapy Consult: No  Current Support Network: Other(lives with her sister)  Confirm Follow Up Transport: Self  The Plan for Transition of Care is Related to the Following Treatment Goals : (Increase strength and safety awareness with vision issues)  The Patient and/or Patient Representative was Provided with a Choice of Provider and Agrees with the Discharge Plan?: Yes  Name of the Patient Representative Who was Provided with a Choice of Provider and Agrees with the Discharge Plan: Patient Reyes Gentry  and chose [address] [place] [address] [place] of Choice List was Provided with Basic Dialogue that Supports the Patient's Individualized Plan of Care/Goals, Treatment Preferences and Shares the Quality Data Associated with the Providers?: Yes  Discharge Location  Discharge Placement: Home with home health  Followed up with patient for d/c planning. Patient alert and oriented siting up in chair. She states wants to go home today after her test she is waiting on. She lives with her sister and is independent of ADL's. She has straight cane at home and has transportation. She has [facility] and obtains medications at [address] [place] [address]. PT/OT recommend home health follow up and patient in agreement. Order obtained for home health and patient chose [facility] home health and referral sent to [facility]. Current d/c plan is home with [address] [place] [address] RN/PT/OT and with her sister. Addendum: Patient does not want an RN for follow up only PT/OT. [address] [place] [address] aware.

## 2020-04-29 PROBLEM — G93.89 CEREBELLAR MASS: Status: RESOLVED | Noted: 2019-12-21 | Resolved: 2020-04-29

## 2020-04-29 PROBLEM — M16.11 UNILATERAL PRIMARY OSTEOARTHRITIS, RIGHT HIP: Status: RESOLVED | Noted: 2020-02-14 | Resolved: 2020-04-29

## 2020-04-29 PROBLEM — R27.0 ATAXIA: Status: RESOLVED | Noted: 2019-12-21 | Resolved: 2020-04-29

## 2020-04-29 PROBLEM — I63.9 ACUTE ISCHEMIC STROKE (HCC): Status: RESOLVED | Noted: 2019-12-22 | Resolved: 2020-04-29

## 2020-04-29 PROBLEM — Z86.73 HISTORY OF STROKE: Status: ACTIVE | Noted: 2020-04-29

## 2020-04-29 PROBLEM — G93.89 CEREBRAL MASS: Status: RESOLVED | Noted: 2019-12-21 | Resolved: 2020-04-29

## 2020-04-29 PROBLEM — M16.11 ARTHRITIS OF RIGHT HIP: Status: RESOLVED | Noted: 2019-08-01 | Resolved: 2020-04-29

## 2020-04-29 PROBLEM — Z96.641 STATUS POST TOTAL REPLACEMENT OF RIGHT HIP: Status: RESOLVED | Noted: 2020-02-14 | Resolved: 2020-04-29

## 2020-04-29 PROBLEM — Z78.9 STATIN INTOLERANCE: Status: ACTIVE | Noted: 2020-04-29

## 2020-04-29 PROBLEM — H53.462 LEFT HOMONYMOUS HEMIANOPSIA: Status: RESOLVED | Noted: 2019-12-21 | Resolved: 2020-04-29

## 2020-05-18 PROBLEM — I48.91 ATRIAL FIBRILLATION (HCC): Status: ACTIVE | Noted: 2020-05-18

## 2020-05-19 ENCOUNTER — HOSPITAL ENCOUNTER (OUTPATIENT)
Dept: ULTRASOUND IMAGING | Age: 75
Discharge: HOME | End: 2020-05-19
Attending: INTERNAL MEDICINE

## 2020-05-19 DIAGNOSIS — I70.0 AORTIC ATHEROSCLEROSIS (HCC): ICD-10-CM

## 2020-05-19 DIAGNOSIS — Z86.73 HISTORY OF STROKE: ICD-10-CM

## 2020-05-19 DIAGNOSIS — I71.21 THORACIC ASCENDING AORTIC ANEURYSM: ICD-10-CM

## 2020-07-06 ENCOUNTER — APPOINTMENT (RX ONLY)
Dept: URBAN - METROPOLITAN AREA CLINIC 23 | Facility: CLINIC | Age: 75
Setting detail: DERMATOLOGY
End: 2020-07-06

## 2020-07-06 DIAGNOSIS — L82.1 OTHER SEBORRHEIC KERATOSIS: ICD-10-CM

## 2020-07-06 DIAGNOSIS — L57.0 ACTINIC KERATOSIS: ICD-10-CM

## 2020-07-06 DIAGNOSIS — Z71.89 OTHER SPECIFIED COUNSELING: ICD-10-CM

## 2020-07-06 DIAGNOSIS — L57.8 OTHER SKIN CHANGES DUE TO CHRONIC EXPOSURE TO NONIONIZING RADIATION: ICD-10-CM

## 2020-07-06 DIAGNOSIS — L81.4 OTHER MELANIN HYPERPIGMENTATION: ICD-10-CM

## 2020-07-06 PROCEDURE — 17000 DESTRUCT PREMALG LESION: CPT

## 2020-07-06 PROCEDURE — 99203 OFFICE O/P NEW LOW 30 MIN: CPT | Mod: 25

## 2020-07-06 PROCEDURE — ? LIQUID NITROGEN

## 2020-07-06 PROCEDURE — ? COUNSELING

## 2020-07-06 ASSESSMENT — LOCATION DETAILED DESCRIPTION DERM
LOCATION DETAILED: LEFT SUPERIOR LATERAL UPPER BACK
LOCATION DETAILED: LEFT INFERIOR MEDIAL FOREHEAD
LOCATION DETAILED: LEFT SUPERIOR FLANK
LOCATION DETAILED: RIGHT LATERAL SUPERIOR CHEST
LOCATION DETAILED: RIGHT RIB CAGE
LOCATION DETAILED: LEFT SUPERIOR UPPER BACK
LOCATION DETAILED: RIGHT INFERIOR UPPER BACK
LOCATION DETAILED: RIGHT SUPERIOR MEDIAL UPPER BACK
LOCATION DETAILED: RIGHT LATERAL MANDIBULAR CHEEK
LOCATION DETAILED: SUBXIPHOID
LOCATION DETAILED: LEFT RIB CAGE
LOCATION DETAILED: MIDDLE STERNUM
LOCATION DETAILED: RIGHT INFERIOR ANTERIOR NECK

## 2020-07-06 ASSESSMENT — LOCATION SIMPLE DESCRIPTION DERM
LOCATION SIMPLE: LEFT UPPER BACK
LOCATION SIMPLE: RIGHT UPPER BACK
LOCATION SIMPLE: ABDOMEN
LOCATION SIMPLE: RIGHT CHEEK
LOCATION SIMPLE: LEFT FOREHEAD
LOCATION SIMPLE: CHEST
LOCATION SIMPLE: RIGHT ANTERIOR NECK

## 2020-07-06 ASSESSMENT — LOCATION ZONE DERM
LOCATION ZONE: NECK
LOCATION ZONE: FACE
LOCATION ZONE: TRUNK

## 2020-07-06 NOTE — HPI: SKIN LESION
What Type Of Note Output Would You Prefer (Optional)?: Standard Output
How Severe Is Your Skin Lesion?: mild
Has Your Skin Lesion Been Treated?: not been treated
Is This A New Presentation, Or A Follow-Up?: Skin Lesion
Additional History: Pt gives verbal consent for biopsy.Aminah

## 2020-10-04 ENCOUNTER — HOSPITAL ENCOUNTER (EMERGENCY)
Age: 75
Discharge: HOME | End: 2020-10-04
Attending: EMERGENCY MEDICINE
Payer: MEDICARE

## 2020-10-04 VITALS
HEART RATE: 71 BPM | DIASTOLIC BLOOD PRESSURE: 74 MMHG | RESPIRATION RATE: 16 BRPM | HEIGHT: 60 IN | WEIGHT: 140 LBS | SYSTOLIC BLOOD PRESSURE: 169 MMHG | BODY MASS INDEX: 27.48 KG/M2 | OXYGEN SATURATION: 96 % | TEMPERATURE: 98.1 F

## 2020-10-04 DIAGNOSIS — I48.0 PAROXYSMAL ATRIAL FIBRILLATION (HCC): Primary | ICD-10-CM

## 2020-10-04 LAB
ALBUMIN SERPL-MCNC: 4.3 G/DL (ref 3.2–4.6)
ALBUMIN/GLOB SERPL: 1 {RATIO} (ref 1.2–3.5)
ALP SERPL-CCNC: 88 U/L (ref 50–136)
ALT SERPL-CCNC: 25 U/L (ref 12–65)
ANION GAP SERPL CALC-SCNC: 7 MMOL/L (ref 7–16)
AST SERPL-CCNC: 43 U/L (ref 15–37)
ATRIAL RATE: 83 BPM
BASOPHILS # BLD: 0.1 K/UL (ref 0–0.2)
BASOPHILS NFR BLD: 1 % (ref 0–2)
BILIRUB SERPL-MCNC: 0.8 MG/DL (ref 0.2–1.1)
BUN SERPL-MCNC: 8 MG/DL (ref 8–23)
CALCIUM SERPL-MCNC: 9.9 MG/DL (ref 8.3–10.4)
CALCULATED P AXIS, ECG09: 74 DEGREES
CALCULATED R AXIS, ECG10: 66 DEGREES
CALCULATED T AXIS, ECG11: 81 DEGREES
CHLORIDE SERPL-SCNC: 98 MMOL/L (ref 98–107)
CO2 SERPL-SCNC: 31 MMOL/L (ref 21–32)
CREAT SERPL-MCNC: 0.86 MG/DL (ref 0.6–1)
DIAGNOSIS, 93000: NORMAL
DIFFERENTIAL METHOD BLD: ABNORMAL
EOSINOPHIL # BLD: 0.1 K/UL (ref 0–0.8)
EOSINOPHIL NFR BLD: 1 % (ref 0.5–7.8)
ERYTHROCYTE [DISTWIDTH] IN BLOOD BY AUTOMATED COUNT: 12 % (ref 11.9–14.6)
GLOBULIN SER CALC-MCNC: 4.4 G/DL (ref 2.3–3.5)
GLUCOSE SERPL-MCNC: 113 MG/DL (ref 65–100)
HCT VFR BLD AUTO: 45.1 % (ref 35.8–46.3)
HGB BLD-MCNC: 16.1 G/DL (ref 11.7–15.4)
IMM GRANULOCYTES # BLD AUTO: 0 K/UL (ref 0–0.5)
IMM GRANULOCYTES NFR BLD AUTO: 0 % (ref 0–5)
LYMPHOCYTES # BLD: 2.2 K/UL (ref 0.5–4.6)
LYMPHOCYTES NFR BLD: 25 % (ref 13–44)
MAGNESIUM SERPL-MCNC: 1.9 MG/DL (ref 1.8–2.4)
MCH RBC QN AUTO: 34.4 PG (ref 26.1–32.9)
MCHC RBC AUTO-ENTMCNC: 35.7 G/DL (ref 31.4–35)
MCV RBC AUTO: 96.4 FL (ref 79.6–97.8)
MONOCYTES # BLD: 0.8 K/UL (ref 0.1–1.3)
MONOCYTES NFR BLD: 9 % (ref 4–12)
NEUTS SEG # BLD: 5.7 K/UL (ref 1.7–8.2)
NEUTS SEG NFR BLD: 65 % (ref 43–78)
NRBC # BLD: 0 K/UL (ref 0–0.2)
P-R INTERVAL, ECG05: 168 MS
PLATELET # BLD AUTO: 298 K/UL (ref 150–450)
PMV BLD AUTO: 11.1 FL (ref 9.4–12.3)
POTASSIUM SERPL-SCNC: 2.9 MMOL/L (ref 3.5–5.1)
PROT SERPL-MCNC: 8.7 G/DL (ref 6.3–8.2)
Q-T INTERVAL, ECG07: 360 MS
QRS DURATION, ECG06: 82 MS
QTC CALCULATION (BEZET), ECG08: 423 MS
RBC # BLD AUTO: 4.68 M/UL (ref 4.05–5.2)
SODIUM SERPL-SCNC: 136 MMOL/L (ref 136–145)
TROPONIN-HIGH SENSITIVITY: 15.6 PG/ML (ref 0–14)
VENTRICULAR RATE, ECG03: 83 BPM
WBC # BLD AUTO: 8.8 K/UL (ref 4.3–11.1)

## 2020-10-04 PROCEDURE — 84484 ASSAY OF TROPONIN QUANT: CPT

## 2020-10-04 PROCEDURE — 99284 EMERGENCY DEPT VISIT MOD MDM: CPT

## 2020-10-04 PROCEDURE — 83735 ASSAY OF MAGNESIUM: CPT

## 2020-10-04 PROCEDURE — 85025 COMPLETE CBC W/AUTO DIFF WBC: CPT

## 2020-10-04 PROCEDURE — 93005 ELECTROCARDIOGRAM TRACING: CPT | Performed by: EMERGENCY MEDICINE

## 2020-10-04 PROCEDURE — 80053 COMPREHEN METABOLIC PANEL: CPT

## 2020-10-04 PROCEDURE — 74011250637 HC RX REV CODE- 250/637: Performed by: EMERGENCY MEDICINE

## 2020-10-04 RX ORDER — POTASSIUM CHLORIDE 1500 MG/1
20 TABLET, FILM COATED, EXTENDED RELEASE ORAL 2 TIMES DAILY
Qty: 10 TAB | Refills: 0 | Status: SHIPPED | OUTPATIENT
Start: 2020-10-04 | End: 2020-10-09

## 2020-10-04 RX ORDER — METOPROLOL SUCCINATE 25 MG/1
25 TABLET, EXTENDED RELEASE ORAL DAILY
Qty: 30 TAB | Refills: 0 | Status: SHIPPED | OUTPATIENT
Start: 2020-10-04 | End: 2020-10-06

## 2020-10-04 RX ORDER — POTASSIUM CHLORIDE 20 MEQ/1
40 TABLET, EXTENDED RELEASE ORAL
Status: COMPLETED | OUTPATIENT
Start: 2020-10-04 | End: 2020-10-04

## 2020-10-04 RX ADMIN — POTASSIUM CHLORIDE 40 MEQ: 1500 TABLET, EXTENDED RELEASE ORAL at 16:16

## 2020-10-04 NOTE — ED TRIAGE NOTES
PT states was here in December dx with Afib prescribed eloquis. Pt states \"today it will not go away\" C/O dizziness. PT denies Confusion,  N/V/D.

## 2020-10-04 NOTE — ED PROVIDER NOTES
67-year-old white female history of paroxysmal atrial fibrillation reports that she is experiencing more A. fib than usual for the past 3 days. No chest pain or shortness of breath. No nausea or vomiting. She is on Eliquis. She states that her cardiologist considered putting her on \"another atrial fibrillation medicine\" but did not. She questions whether she should be started on this medicine now that her A. fib is giving more frequent. The history is provided by the patient. Irregular Heart Beat    Pertinent negatives include no fever, no chest pain, no abdominal pain, no nausea, no vomiting, no headaches, no back pain, no cough and no shortness of breath.         Past Medical History:   Diagnosis Date    Acute ischemic stroke ([facility] [address] 12/22/2019    Aortic atherosclerosis (HCC)     Dr. Alba Choi following     Arthritis     Ascending aortic aneurysm (HCC)     4.2 cm, Followed by Dr. 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    Unilateral primary osteoarthritis, right hip 2/14/2020    Vitamin D deficiency     10 + yrs ago per pt       Past Surgical History:   Procedure Laterality Date    HX CARPAL TUNNEL RELEASE Bilateral 2004, 2005    HX CATARACT REMOVAL  07/10/2019    Bilateral     HX TONSILLECTOMY      HX TUBAL LIGATION           Family History:   Problem Relation Age of Onset    Diabetes Mother     Cancer Mother         cervical, thyroid w/ thyroidectomy     Stroke Mother     Heart Attack Father     Other Father         collapsed lung; tobacco abuse     Diabetes Sister     COPD Sister     Diabetes Brother     Cancer Brother         liver       Social History     Socioeconomic History    Marital status: SINGLE     Spouse name: Not on file    Number of children: Not on file    Years of education: Not on file    Highest education level: Not on file   Occupational History    Not on file   Social Needs    Financial resource strain: Not on file    Food insecurity     Worry: Not on file     Inability: Not on file   [employer] needs     Medical: Not on file     Non-medical: Not on file   Tobacco Use    Smoking status: Former Smoker     Packs/day: 0.50     Years: 30.00     Pack years: 15.00     Last attempt to quit: 7/20/2005     Years since quitting: 15.2    Smokeless tobacco: Never Used    Tobacco comment: 10-11 yrs ago quit   Substance and Sexual Activity    Alcohol use:  Yes     Alcohol/week: 1.0 standard drinks     Types: 1 Glasses of wine per week    Drug use: Not Currently    Sexual activity: Not on file   Lifestyle    Physical activity     Days per week: Not on file     Minutes per session: Not on file    Stress: Not on file   Relationships    Social connections     Talks on phone: Not on file     Gets together: Not on file     Attends Mandaen service: Not on file     Active member of club or organization: Not on file     Attends meetings of clubs or organizations: Not on file     Relationship status: Not on file    Intimate partner violence     Fear of current or ex partner: Not on file     Emotionally abused: Not on file     Physically abused: Not on file     Forced sexual activity: Not on file   Other Topics Concern     Service Not Asked    Blood Transfusions Not Asked    Caffeine Concern Not Asked    Occupational Exposure Not Asked   Merl Se[employer] Hazards Not Asked    Sleep Concern Not Asked    Stress Concern Not Asked    Weight Concern Not Asked    Special Diet Not Asked    Back Care Not Asked    Exercise Yes     Comment: [employer] tiw    Bike Helmet Not Asked  Seat Belt Not Asked    Self-Exams Not Asked   Social History Narrative    Not on file         ALLERGIES: Patient has no known allergies. Review of Systems   Constitutional: Negative for fever. HENT: Negative for congestion. Respiratory: Negative for cough and shortness of breath. Cardiovascular: Positive for palpitations. Negative for chest pain and leg swelling. Gastrointestinal: Negative for abdominal pain, nausea and vomiting. Genitourinary: Negative for dysuria. Musculoskeletal: Negative for back pain and neck pain. Skin: Negative for rash. Neurological: Negative for headaches. Psychiatric/Behavioral: Negative for confusion. Vitals:    10/04/20 1520   BP: (!) 172/83   Pulse: 82   Resp: 20   Temp: 97.9 °F (36.6 °C)   SpO2: 98%   Weight: 63.5 kg (140 lb)   Height: 5' (1.524 m)            Physical Exam  Vitals signs and nursing note reviewed. Constitutional:       General: She is not in acute distress. Appearance: Normal appearance. She is not toxic-appearing. HENT:      Head: Normocephalic and atraumatic. Nose: Nose normal.      Mouth/Throat:      Mouth: Mucous membranes are moist.      Pharynx: Oropharynx is clear. Eyes:      Conjunctiva/sclera: Conjunctivae normal.      Pupils: Pupils are equal, round, and reactive to light. Neck:      Musculoskeletal: Normal range of motion and neck supple. Cardiovascular:      Rate and Rhythm: Normal rate. Rhythm irregular. Heart sounds: No murmur. Pulmonary:      Effort: Pulmonary effort is normal.      Breath sounds: Normal breath sounds. No wheezing. Abdominal:      General: There is no distension. Palpations: Abdomen is soft. Tenderness: There is no abdominal tenderness. There is no guarding. Musculoskeletal: Normal range of motion. General: No swelling. Skin:     General: Skin is warm and dry. Neurological:      Mental Status: She is alert and oriented to person, place, and time. Psychiatric:         Mood and Affect: Mood normal.         Behavior: Behavior normal.          MDM  Number of Diagnoses or Management Options  Paroxysmal atrial fibrillation [facility]):   Diagnosis management comments: EKG shows normal sinus rhythm with occasional PACs. Lab work unremarkable except for mildly low potassium 2.9. Patient treated with Ethel Pacheco. Review of office notes indicates patient was going to be placed on Multaq however she could not afford it and therefore she was only placed on Eliquis. Discussed with cardiology Dr. Latoya Hills who does not recommend starting Multitak out of the emergency department, would prefer a beta-blocker. Will start patient on 25 mg Toprol-XL. She is advised to follow-up with Dr. Kidd Speak this week.        Amount and/or Complexity of Data Reviewed  Clinical lab tests: ordered and reviewed  Tests in the medicine section of CPT®: ordered and reviewed  Independent visualization of images, tracings, or specimens: yes    Risk of Complications, Morbidity, and/or Mortality  Presenting problems: moderate  Diagnostic procedures: moderate  Management options: moderate           Procedures

## 2020-10-04 NOTE — DISCHARGE INSTRUCTIONS

## 2020-10-04 NOTE — ED NOTES
I have reviewed discharge instructions with the patient. The patient verbalized understanding. Patient left ED via Discharge Method: ambulatory to Home with friend. Opportunity for questions and clarification provided. Patient given 1 scripts. To continue your aftercare when you leave the hospital, you may receive an automated call from our care team to check in on how you are doing. This is a free service and part of our promise to provide the best care and service to meet your aftercare needs.  If you have questions, or wish to unsubscribe from this service please call [phone]. Thank you for Choosing our [facility] Emergency Department.

## 2020-10-06 PROBLEM — R42 VERTIGO: Status: ACTIVE | Noted: 2020-10-06

## 2020-10-19 ENCOUNTER — PATIENT OUTREACH (OUTPATIENT)
Dept: CASE MANAGEMENT | Age: 75
End: 2020-10-19

## 2020-12-01 ENCOUNTER — APPOINTMENT (OUTPATIENT)
Dept: CT IMAGING | Age: 75
DRG: 093 | End: 2020-12-01
Attending: FAMILY MEDICINE
Payer: MEDICARE

## 2020-12-01 ENCOUNTER — APPOINTMENT (OUTPATIENT)
Dept: MRI IMAGING | Age: 75
DRG: 093 | End: 2020-12-01
Attending: FAMILY MEDICINE
Payer: MEDICARE

## 2020-12-01 ENCOUNTER — HOSPITAL ENCOUNTER (INPATIENT)
Age: 75
LOS: 2 days | Discharge: HOME | DRG: 093 | End: 2020-12-03
Attending: EMERGENCY MEDICINE | Admitting: FAMILY MEDICINE
Payer: MEDICARE

## 2020-12-01 ENCOUNTER — APPOINTMENT (OUTPATIENT)
Dept: CT IMAGING | Age: 75
DRG: 093 | End: 2020-12-01
Attending: EMERGENCY MEDICINE
Payer: MEDICARE

## 2020-12-01 DIAGNOSIS — R55 SYNCOPE, UNSPECIFIED SYNCOPE TYPE: ICD-10-CM

## 2020-12-01 DIAGNOSIS — I63.9 CEREBROVASCULAR ACCIDENT (CVA), UNSPECIFIED MECHANISM (HCC): Primary | ICD-10-CM

## 2020-12-01 DIAGNOSIS — H53.47 HEMIANOPSIA: ICD-10-CM

## 2020-12-01 PROBLEM — E04.1 THYROID NODULE: Status: ACTIVE | Noted: 2020-12-01

## 2020-12-01 PROBLEM — I67.1 ANEURYSM, CAROTID ARTERY, INTERNAL: Status: ACTIVE | Noted: 2020-12-01

## 2020-12-01 PROBLEM — R41.89 UNRESPONSIVE: Status: ACTIVE | Noted: 2020-12-01

## 2020-12-01 PROBLEM — H53.9 VISUAL CHANGES: Status: ACTIVE | Noted: 2020-12-01

## 2020-12-01 PROBLEM — I48.0 PAROXYSMAL ATRIAL FIBRILLATION (HCC): Status: ACTIVE | Noted: 2020-05-18

## 2020-12-01 LAB
AMPHET UR QL SCN: NEGATIVE
ANION GAP SERPL CALC-SCNC: 8 MMOL/L (ref 7–16)
BARBITURATES UR QL SCN: NEGATIVE
BENZODIAZ UR QL: NEGATIVE
BUN SERPL-MCNC: 12 MG/DL (ref 8–23)
CALCIUM SERPL-MCNC: 9.2 MG/DL (ref 8.3–10.4)
CANNABINOIDS UR QL SCN: NEGATIVE
CHLORIDE SERPL-SCNC: 98 MMOL/L (ref 98–107)
CHOLEST SERPL-MCNC: 198 MG/DL
CO2 SERPL-SCNC: 31 MMOL/L (ref 21–32)
COCAINE UR QL SCN: NEGATIVE
CREAT SERPL-MCNC: 0.69 MG/DL (ref 0.6–1)
ERYTHROCYTE [DISTWIDTH] IN BLOOD BY AUTOMATED COUNT: 12 % (ref 11.9–14.6)
EST. AVERAGE GLUCOSE BLD GHB EST-MCNC: 120 MG/DL
GLUCOSE BLD STRIP.AUTO-MCNC: 105 MG/DL (ref 65–100)
GLUCOSE BLD STRIP.AUTO-MCNC: 131 MG/DL (ref 65–100)
GLUCOSE SERPL-MCNC: 89 MG/DL (ref 65–100)
HBA1C MFR BLD: 5.8 %
HCT VFR BLD AUTO: 41.4 % (ref 35.8–46.3)
HDLC SERPL-MCNC: 68 MG/DL (ref 40–60)
HDLC SERPL: 2.9 {RATIO}
HGB BLD-MCNC: 14.4 G/DL (ref 11.7–15.4)
LDLC SERPL CALC-MCNC: 107.2 MG/DL
LIPID PROFILE,FLP: ABNORMAL
MAGNESIUM SERPL-MCNC: 1.7 MG/DL (ref 1.8–2.4)
MCH RBC QN AUTO: 33.9 PG (ref 26.1–32.9)
MCHC RBC AUTO-ENTMCNC: 34.8 G/DL (ref 31.4–35)
MCV RBC AUTO: 97.4 FL (ref 79.6–97.8)
METHADONE UR QL: NEGATIVE
NRBC # BLD: 0 K/UL (ref 0–0.2)
OPIATES UR QL: NEGATIVE
PCP UR QL: NEGATIVE
PLATELET # BLD AUTO: 279 K/UL (ref 150–450)
PMV BLD AUTO: 11.4 FL (ref 9.4–12.3)
POTASSIUM SERPL-SCNC: 3 MMOL/L (ref 3.5–5.1)
RBC # BLD AUTO: 4.25 M/UL (ref 4.05–5.2)
SODIUM SERPL-SCNC: 137 MMOL/L (ref 136–145)
T4 FREE SERPL-MCNC: 1 NG/DL (ref 0.78–1.46)
TRIGL SERPL-MCNC: 114 MG/DL (ref 35–150)
TSH SERPL DL<=0.005 MIU/L-ACNC: 3.46 UIU/ML
VLDLC SERPL CALC-MCNC: 22.8 MG/DL (ref 6–23)
WBC # BLD AUTO: 9.3 K/UL (ref 4.3–11.1)

## 2020-12-01 PROCEDURE — 83036 HEMOGLOBIN GLYCOSYLATED A1C: CPT

## 2020-12-01 PROCEDURE — 70450 CT HEAD/BRAIN W/O DYE: CPT

## 2020-12-01 PROCEDURE — 74011000636 HC RX REV CODE- 636: Performed by: EMERGENCY MEDICINE

## 2020-12-01 PROCEDURE — 99218 HC RM OBSERVATION: CPT

## 2020-12-01 PROCEDURE — 0042T CT PERF W CBF: CPT

## 2020-12-01 PROCEDURE — 74011250637 HC RX REV CODE- 250/637: Performed by: FAMILY MEDICINE

## 2020-12-01 PROCEDURE — 84439 ASSAY OF FREE THYROXINE: CPT

## 2020-12-01 PROCEDURE — 85027 COMPLETE CBC AUTOMATED: CPT

## 2020-12-01 PROCEDURE — 70498 CT ANGIOGRAPHY NECK: CPT

## 2020-12-01 PROCEDURE — 80307 DRUG TEST PRSMV CHEM ANLYZR: CPT

## 2020-12-01 PROCEDURE — 74011250637 HC RX REV CODE- 250/637: Performed by: EMERGENCY MEDICINE

## 2020-12-01 PROCEDURE — 80061 LIPID PANEL: CPT

## 2020-12-01 PROCEDURE — 82962 GLUCOSE BLOOD TEST: CPT

## 2020-12-01 PROCEDURE — 74011250636 HC RX REV CODE- 250/636: Performed by: FAMILY MEDICINE

## 2020-12-01 PROCEDURE — 80048 BASIC METABOLIC PNL TOTAL CA: CPT

## 2020-12-01 PROCEDURE — 4A03X5D MEASUREMENT OF ARTERIAL FLOW, INTRACRANIAL, EXTERNAL APPROACH: ICD-10-PCS | Performed by: RADIOLOGY

## 2020-12-01 PROCEDURE — 84443 ASSAY THYROID STIM HORMONE: CPT

## 2020-12-01 PROCEDURE — 74011000258 HC RX REV CODE- 258: Performed by: EMERGENCY MEDICINE

## 2020-12-01 PROCEDURE — 86580 TB INTRADERMAL TEST: CPT | Performed by: FAMILY MEDICINE

## 2020-12-01 PROCEDURE — 65610000001 HC ROOM ICU GENERAL

## 2020-12-01 PROCEDURE — 70551 MRI BRAIN STEM W/O DYE: CPT

## 2020-12-01 PROCEDURE — 71275 CT ANGIOGRAPHY CHEST: CPT

## 2020-12-01 PROCEDURE — 83735 ASSAY OF MAGNESIUM: CPT

## 2020-12-01 PROCEDURE — 77010033678 HC OXYGEN DAILY

## 2020-12-01 PROCEDURE — 99284 EMERGENCY DEPT VISIT MOD MDM: CPT

## 2020-12-01 PROCEDURE — 74011000302 HC RX REV CODE- 302: Performed by: FAMILY MEDICINE

## 2020-12-01 PROCEDURE — 74011250636 HC RX REV CODE- 250/636

## 2020-12-01 PROCEDURE — 74011000636 HC RX REV CODE- 636: Performed by: FAMILY MEDICINE

## 2020-12-01 RX ORDER — POLYETHYLENE GLYCOL 3350 17 G/17G
17 POWDER, FOR SOLUTION ORAL DAILY PRN
Status: DISCONTINUED | OUTPATIENT
Start: 2020-12-01 | End: 2020-12-03 | Stop reason: HOSPADM

## 2020-12-01 RX ORDER — MELATONIN
1000 DAILY
Status: DISCONTINUED | OUTPATIENT
Start: 2020-12-02 | End: 2020-12-03 | Stop reason: HOSPADM

## 2020-12-01 RX ORDER — ACETAMINOPHEN 650 MG/1
650 SUPPOSITORY RECTAL
Status: DISCONTINUED | OUTPATIENT
Start: 2020-12-01 | End: 2020-12-03 | Stop reason: HOSPADM

## 2020-12-01 RX ORDER — POTASSIUM CHLORIDE 14.9 MG/ML
20 INJECTION INTRAVENOUS
Status: DISCONTINUED | OUTPATIENT
Start: 2020-12-01 | End: 2020-12-01

## 2020-12-01 RX ORDER — LABETALOL HYDROCHLORIDE 5 MG/ML
5 INJECTION, SOLUTION INTRAVENOUS
Status: DISCONTINUED | OUTPATIENT
Start: 2020-12-01 | End: 2020-12-03 | Stop reason: HOSPADM

## 2020-12-01 RX ORDER — ACETAMINOPHEN 325 MG/1
650 TABLET ORAL
Status: DISCONTINUED | OUTPATIENT
Start: 2020-12-01 | End: 2020-12-03 | Stop reason: HOSPADM

## 2020-12-01 RX ORDER — SODIUM CHLORIDE 0.9 % (FLUSH) 0.9 %
10 SYRINGE (ML) INJECTION
Status: COMPLETED | OUTPATIENT
Start: 2020-12-01 | End: 2020-12-01

## 2020-12-01 RX ORDER — PANTOPRAZOLE SODIUM 40 MG/1
40 TABLET, DELAYED RELEASE ORAL
Status: DISCONTINUED | OUTPATIENT
Start: 2020-12-02 | End: 2020-12-03 | Stop reason: HOSPADM

## 2020-12-01 RX ORDER — GUAIFENESIN 100 MG/5ML
81 LIQUID (ML) ORAL
Status: COMPLETED | OUTPATIENT
Start: 2020-12-01 | End: 2020-12-01

## 2020-12-01 RX ORDER — LORAZEPAM 2 MG/ML
INJECTION INTRAMUSCULAR
Status: COMPLETED
Start: 2020-12-01 | End: 2020-12-01

## 2020-12-01 RX ORDER — ATORVASTATIN CALCIUM 40 MG/1
40 TABLET, FILM COATED ORAL
Status: DISCONTINUED | OUTPATIENT
Start: 2020-12-01 | End: 2020-12-03

## 2020-12-01 RX ORDER — ONDANSETRON 2 MG/ML
4 INJECTION INTRAMUSCULAR; INTRAVENOUS
Status: DISCONTINUED | OUTPATIENT
Start: 2020-12-01 | End: 2020-12-03 | Stop reason: HOSPADM

## 2020-12-01 RX ORDER — LORAZEPAM 2 MG/ML
1 INJECTION INTRAMUSCULAR
Status: DISCONTINUED | OUTPATIENT
Start: 2020-12-01 | End: 2020-12-03 | Stop reason: HOSPADM

## 2020-12-01 RX ORDER — LORAZEPAM 2 MG/ML
1 INJECTION INTRAMUSCULAR
Status: COMPLETED | OUTPATIENT
Start: 2020-12-01 | End: 2020-12-01

## 2020-12-01 RX ORDER — POLYETHYLENE GLYCOL 3350 17 G/17G
17 POWDER, FOR SOLUTION ORAL
Status: DISCONTINUED | OUTPATIENT
Start: 2020-12-01 | End: 2020-12-01 | Stop reason: SDUPTHER

## 2020-12-01 RX ADMIN — POTASSIUM CHLORIDE 20 MEQ: 14.9 INJECTION, SOLUTION INTRAVENOUS at 21:31

## 2020-12-01 RX ADMIN — LORAZEPAM 1 MG: 2 INJECTION, SOLUTION INTRAMUSCULAR; INTRAVENOUS at 17:00

## 2020-12-01 RX ADMIN — SODIUM CHLORIDE 100 ML: 900 INJECTION, SOLUTION INTRAVENOUS at 11:28

## 2020-12-01 RX ADMIN — TUBERCULIN PURIFIED PROTEIN DERIVATIVE 5 UNITS: 5 INJECTION, SOLUTION INTRADERMAL at 21:27

## 2020-12-01 RX ADMIN — POTASSIUM CHLORIDE 20 MEQ: 14.9 INJECTION, SOLUTION INTRAVENOUS at 19:02

## 2020-12-01 RX ADMIN — IOPAMIDOL 100 ML: 755 INJECTION, SOLUTION INTRAVENOUS at 11:28

## 2020-12-01 RX ADMIN — IOPAMIDOL 60 ML: 755 INJECTION, SOLUTION INTRAVENOUS at 16:32

## 2020-12-01 RX ADMIN — IOPAMIDOL 40 ML: 755 INJECTION, SOLUTION INTRAVENOUS at 16:31

## 2020-12-01 RX ADMIN — APIXABAN 5 MG: 5 TABLET, FILM COATED ORAL at 21:27

## 2020-12-01 RX ADMIN — ASPIRIN 81 MG: 81 TABLET, CHEWABLE ORAL at 12:43

## 2020-12-01 RX ADMIN — LORAZEPAM 1 MG: 2 INJECTION INTRAMUSCULAR at 17:00

## 2020-12-01 RX ADMIN — Medication 10 ML: at 11:28

## 2020-12-01 RX ADMIN — Medication 10 ML: at 21:31

## 2020-12-01 NOTE — ED NOTES
TRANSFER - OUT REPORT:    Verbal report given to LOIDA Barnes on Jena Cunningham  being transferred to [address] [place] [address] for routine progression of care       Report consisted of patients Situation, Background, Assessment and   Recommendations(SBAR). Information from the following report(s) SBAR was reviewed with the receiving nurse. Lines:   Peripheral IV 12/01/20 Left; Outer Antecubital (Active)        Opportunity for questions and clarification was provided.       Patient transported with:   Registered Nurse

## 2020-12-01 NOTE — PROGRESS NOTES
Patients skin intact, with minor abrasions to right posterior leg and left forearm. Patient was combative with staff in the CT scan and sustained some scrapes/abrasions while on CT table.

## 2020-12-01 NOTE — H&P
Hospitalist Admission History and Physical     NAME:  Pablo Weber   Age:  75 y.o.  :   1945   MRN:   [id number]  PCP: Yaritza Burris MD  Consulting MD:  Treatment Team: Attending Provider: Devon Galvan DO; Primary Nurse: Marianela Hendrickson RN    Chief Complaint   Patient presents with   Ronette Goodie Vision         HPI:   Patient is a 75 y.o. female who presented to the ED for cc difficulty with vision since this AM at 8:00. No TPA given. NIH score 3. Hx of paroxysmal a fib on Eliquis, vertigo,  Ascending aortic anerysm followed by Dr. Theresa Morales, COPD, HLD, HTN, and CVA Dec 2019 in right occipital lobe. CT head with no acute changes. CT perfusion study negative. CTA head neck showed 3 mm intracranial left internal carotid artery aneurysm. No proximal vessel occlusion or significant stenosis in the major intracranial arterial vasculature. Negative CTA neck exam for significant stenosis or occlusion in the major cervical arterial vasculature. 11 mm right thyroid lobe nodule.  This could be further evaluated with a nonurgent thyroid ultrasound exam.  Past Medical History:   Diagnosis Date    Acute ischemic stroke ([address] 2019    Aortic atherosclerosis (HCC)     Dr. Theresa Morales following     Arthritis     Ascending aortic aneurysm (HCC)     4.2 cm, Followed by Dr. Theresa Morales- per office note dated 18- no change in size; follow up in 3 yrs     Chronic airway obstruction, not elsewhere classified     COPD diagnosed by PCP, pt states no symptoms- if anything very mild     GERD (gastroesophageal reflux disease)     Managed with meds     Hiatal hernia     Hyperlipemia     Pt denies     Hypertension     Managed with meds     Osteopenia     Status post total replacement of right hip 2020    Unilateral primary osteoarthritis, right hip 2020    Vitamin D deficiency     10 + yrs ago per pt        Past Surgical History:   Procedure Laterality Date    HX CARPAL TUNNEL RELEASE Bilateral 2004, 2005    HX CATARACT REMOVAL  07/10/2019    Bilateral     HX TONSILLECTOMY      HX TUBAL LIGATION          Family History   Problem Relation Age of Onset    Diabetes Mother     Cancer Mother         cervical, thyroid w/ thyroidectomy     Stroke Mother     Heart Attack Father     Other Father         collapsed lung; tobacco abuse     Diabetes Sister     COPD Sister     Diabetes Brother     Cancer Brother         liver       Social History     Social History Narrative    Not on file        Social History     Tobacco Use    Smoking status: Former Smoker     Packs/day: 0.50     Years: 30.00     Pack years: 15.00     Last attempt to quit: 7/20/2005     Years since quitting: 15.3    Smokeless tobacco: Never Used    Tobacco comment: 10-11 yrs ago quit   Substance Use Topics    Alcohol use: Yes     Alcohol/week: 1.0 standard drinks     Types: 1 Glasses of wine per week        Social History     Substance and Sexual Activity   Drug Use Not Currently         No Known Allergies    Prior to Admission medications    Medication Sig Start Date End Date Taking? Authorizing Provider   metoprolol succinate (TOPROL-XL) 25 mg XL tablet Take  by mouth daily. Provider, Historical   losartan-hydroCHLOROthiazide (HYZAAR) 100-25 mg per tablet Take 1 Tab by mouth daily. 9/3/20   Ning Greco MD   esomeprazole (NEXIUM) 40 mg capsule Take 1 Cap by mouth daily as needed (GERD). Patient taking differently: Take 40 mg by mouth daily. 9/3/20   Ning Greco MD   Eliquis 5 mg tablet Take 1 Tab by mouth two (2) times a day. 9/3/20   Ning Greco MD   multivitamin (ONE A DAY) tablet Take 1 Tab by mouth daily. Provider, Historical   ascorbic acid, vitamin C, (Vitamin C) 1,000 mg tablet Take  by mouth. Provider, Historical   cholecalciferol, vitamin D3, (VITAMIN D3 PO) Take  by mouth. Provider, Historical   POTASSIUM-99 PO Take  by mouth. Provider, Historical   CALCIUM-MAGNESIUM PO Take  by mouth. Provider, Historical           Review of Systems    Constitutional:NAD  Eyes:  Having difficulty seeing some objects out of both eyes   Ears, nose, mouth, throat, and face: no  Odynphagia, dysphagia, no thrush or exudate, negative for chronic sinus congestion, recurrent headaches  Respiratory: negative for SOB, hemoptysis or cough  Cardiovascular: negative for CP, palpitations, or PND  Gastrointestinal: negative for abdominal pain, no hematemesis, hematochezia or BRBPR  Genitourinary: no urgency, frequency, or dysuria, no nocturia  Integument/breast: negative for skin rash or skin lesions  Hematologic/lymphatic: negative for known bleeding disorder  Musculoskeletal:negative for joint pain or joint tenderness  Neurological: negative for lightheadedness, syncope or presyncopal events, no seizure or CVA history  Behavioral/Psych: negative for depression or chronic anxiety,   Endocrine: negative for polydyspia, polyuria or intolerance to heat or cold  Allergic/Immunologic: negative for chronic allergic rhinitis, or known connective tissue disorder      Objective:     Visit Vitals  BP (!) 174/90   Pulse 81   Temp 97.9 °F (36.6 °C)   Resp 16   Wt 61.2 kg (135 lb)   SpO2 96%   BMI 26.37 kg/m²        No intake/output data recorded. No intake/output data recorded. Data Review:   Recent Results (from the past 24 hour(s))   GLUCOSE, POC    Collection Time: 12/01/20 11:05 AM   Result Value Ref Range    Glucose (POC) 105 (H) 65 - 100 mg/dL       Physical Exam:     General:  Alert, cooperative, no distress, appears stated age. Eyes:  Conjunctivae/corneas clear. PERRL, no nystagmus    Ears:  Normal TMs and external ear canals both ears. Nose: Nares normal. Septum midline. Mouth/Throat: Lips, mucosa, and tongue normal.    Neck:  no JVD. Back:   deferred   Lungs:   Clear to auscultation bilaterally. Heart:  Regular rate and rhythm, S1, S2 normal   Abdomen:   Soft, non-tender.  Bowel sounds normal.    Extremities: 5/5 strength to LE bilaterally and right UE. Left arm she cannot appropriately grab my hand. Having difficulty with vision   Pulses: 2+ and symmetric all extremities.   Skin: Skin color, texture, turgor normal. No rashes or lesions   Lymph nodes: Cervical, supraclavicular, and axillary nodes normal.   Neurologic: CNII-XII intact.      Assessment and Plan     Principal Problem:    Visual changes (12/1/2020)    Active Problems:    Hypertension ()      GERD (gastroesophageal reflux disease) ()      Hyperlipemia ()      Stage 1 mild COPD by GOLD classification (HCC) ()      Thoracic ascending aortic aneurysm (HCC) ()      Paroxysmal atrial fibrillation (HCC) (5/18/2020)      Thyroid nodule (12/1/2020)      Aneurysm, carotid artery, internal (12/1/2020)    Visual changes - Admit as observation for CVA work up. Eliquis and statin. Order labs. ECHO and MRI pending. Allow permissive HTN for first 24 hrs.     Internal carotid artery aneurysm - Monitor. 3mm so risk of rupture low. Keep BP controlled.     Thyroid nodule - Will need outpatient thyroid US    Paroxysmal a fib - Eliquis    She wishes to be DNR    Update@15:21- NIH 7 from 3. Increased left facial droop and slurred speech. Repeat CT head STAT to ensure no hemorrhagic conversion. Consult neuro to see in AM. Reconsult tele neuro.     Update@16:23- Code S called due to unresponsiveness. I had just gotten off phone with neurology. Ordering repeat CT head, CT perfusion, MRI, and also ordering CT chest abdomen with and without contrast to see if has ruptured aortic aneurysm. Not likely PRESS per neuro. I appreciate neurology's help. I have updated the niece, Ngoc Paris [phone]. Patient does not have any children.     DVT prophylaxis - Eliquis  Signed By: Cherelle Llanes,    December 1, 2020

## 2020-12-01 NOTE — PROGRESS NOTES
Care Management Interventions  PCP Verified by CM: Yes(Dr. Omer Madera)  Mode of Transport at Discharge: (family)  Current Support Network: Own Home(Sister (Shanthi Martinez) lives with pt)  Confirm Follow Up Transport: Family  The Patient and/or Patient Representative was Provided with a Choice of Provider and Agrees with the Discharge Plan?: Yes  Freedom of Choice List was Provided with Basic Dialogue that Supports the Patient's Individualized Plan of Care/Goals, Treatment Preferences and Shares the Quality Data Associated with the Providers?: Yes  Discharge Location  Discharge Placement: Unable to determine at this time   Visited with pt regarding plans for discharge, pt is a 74 y/o F with Hx of Stroke and here for c/o blurred vision that started today around 0800 am. Pt lives at home with her sisterZaida Wu) who is on O2 and requires assistance from her sister. Pt is very inde with ADL's, drives and cooks. Sees Dr. Raymundo Davidson and RX are filled through [facility] or @ [facility] [place]. Per pt her ER contact is her niece/Ngoc Paris #[phone]. PT/OT/SL PPD orders have been placed and CM will follow until d/c.

## 2020-12-01 NOTE — ED PROVIDER NOTES
The history is provided by the patient. Blurred Vision    This is a new problem. Episode onset: 0800. The problem occurs constantly. The problem has not changed since onset. The injury mechanism was none. The patient is experiencing no pain. There is no history of trauma to the eye. Associated symptoms include blurred vision. Pertinent negatives include no numbness, no photophobia, no eye redness, no nausea, no vomiting, no weakness and no fever.         Past Medical History:   Diagnosis Date    Acute ischemic stroke ([facility] [address] 12/22/2019    Aortic atherosclerosis (HCC)     Dr. Kimi Gunter following     Arthritis     Ascending aortic aneurysm (HCC)     4.2 cm, Followed by Dr. 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    Unilateral primary osteoarthritis, right hip 2/14/2020    Vitamin D deficiency     10 + yrs ago per pt       Past Surgical History:   Procedure Laterality Date    HX CARPAL TUNNEL RELEASE Bilateral 2004, 2005    HX CATARACT REMOVAL  07/10/2019    Bilateral     HX TONSILLECTOMY      HX TUBAL LIGATION           Family History:   Problem Relation Age of Onset    Diabetes Mother     Cancer Mother         cervical, thyroid w/ thyroidectomy     Stroke Mother     Heart Attack Father     Other Father         collapsed lung; tobacco abuse     Diabetes Sister     COPD Sister     Diabetes Brother     Cancer Brother         liver       Social History     Socioeconomic History    Marital status: SINGLE     Spouse name: Not on file    Number of children: Not on file    Years of education: Not on file   [facility] education level: Not on file   Occupational History    Not on file   Social Needs    Financial resource strain: Not on file    Food insecurity     Worry: Not on file     Inability: Not on file    Transportation needs     Medical: Not on file     Non-medical: Not on file   Tobacco Use    Smoking status: Former Smoker     Packs/day: 0.50     Years: 30.00     Pack years: 15.00     Last attempt to quit: 7/20/2005     Years since quitting: 15.3    Smokeless tobacco: Never Used    Tobacco comment: 10-11 yrs ago quit   Substance and Sexual Activity    Alcohol use: Yes     Alcohol/week: 1.0 standard drinks     Types: 1 Glasses of wine per week    Drug use: Not Currently    Sexual activity: Not on file   Lifestyle    Physical activity     Days per week: Not on file     Minutes per session: Not on file    Stress: Not on file   Relationships    Social connections     Talks on phone: Not on file     Gets together: Not on file     Attends [employer] service: Not on file     Active member of club or organization: Not on file     Attends meetings of clubs or organizations: Not on file     Relationship status: Not on file    Intimate partner violence     Fear of current or ex partner: Not on file     Emotionally abused: Not on file     Physically abused: Not on file     Forced sexual activity: Not on file   Other Topics Concern     Service Not Asked    Blood Transfusions Not Asked    Caffeine Concern Not Asked    Occupational Exposure Not Asked   Dionna Carlos Hazards Not Asked    Sleep Concern Not Asked    Stress Concern Not Asked    Weight Concern Not Asked    Special Diet Not Asked    Back Care Not Asked    Exercise Yes     Comment: [employer] tiw    Bike Helmet Not Asked   [address] Not Asked    Self-Exams Not Asked   Social History Narrative    Not on file         ALLERGIES: Patient has no known allergies. Review of Systems   Constitutional: Negative for chills and fever.    HENT: Negative for congestion, rhinorrhea and sore throat. Eyes: Positive for blurred vision. Negative for photophobia and redness. Respiratory: Negative for cough and shortness of breath. Cardiovascular: Negative for chest pain, palpitations and leg swelling. Gastrointestinal: Negative for abdominal pain, diarrhea, nausea and vomiting. Endocrine: Negative for polydipsia and polyuria. Musculoskeletal: Negative for back pain and myalgias. Neurological: Negative for weakness, numbness and headaches. Vitals:    12/01/20 1101   BP: (!) 174/90   Pulse: 81   Resp: 16   Temp: 97.9 °F (36.6 °C)   SpO2: 96%   Weight: 61.2 kg (135 lb)            Physical Exam  Vitals signs and nursing note reviewed. Constitutional:       General: She is not in acute distress. Appearance: She is well-developed. HENT:      Head: Normocephalic. Eyes:      Pupils: Pupils are equal, round, and reactive to light. Cardiovascular:      Rate and Rhythm: Normal rate and regular rhythm. Heart sounds: Normal heart sounds. Pulmonary:      Effort: Pulmonary effort is normal.      Breath sounds: Normal breath sounds. Abdominal:      General: There is no distension. Palpations: Abdomen is soft. There is no mass. Tenderness: There is no abdominal tenderness. There is no guarding or rebound. Musculoskeletal: Normal range of motion. Lymphadenopathy:      Cervical: No cervical adenopathy. Skin:     General: Skin is warm and dry. Neurological:      Mental Status: She is alert and oriented to person, place, and time. Cranial Nerves: No cranial nerve deficit. Sensory: No sensory deficit. Motor: No weakness. Coordination: Coordination normal.      Gait: Gait normal.      Comments: Bilateral hemianopsia present          MDM  Number of Diagnoses or Management Options  Cerebrovascular accident (CVA), unspecified mechanism ([facility] [address]):   Hemianopsia:   Diagnosis management comments:  On exam patient has a bilateral hemianopsia with left-sided deficit. No other symptoms noted. She did have a little bit of trouble with finger-to-nose on the left due to this vision loss. Review of records reveals previous stroke in the right occipital lobe about a year ago. Patient took her Eliquis this morning and is not a TPA candidate. CT CTA of the head and neck and CT perfusion ordered. Stroke alert called. 12:41 PM  No large vessel occlusion. Patient to be admitted for MRI and repeat echo with bubble study. 81 mg aspirin recommended by neurology. Amount and/or Complexity of Data Reviewed  Clinical lab tests: ordered and reviewed (Results for orders placed or performed during the hospital encounter of 12/01/20  -GLUCOSE, POC       Result                      Value             Ref Range           Glucose (POC)               105 (H)           65 - 100 mg/*  )  Tests in the radiology section of CPT®: ordered and reviewed (Ct Perf W Cbf    Result Date: 12/1/2020  CT HEAD WITHOUT CONTRAST. INDICATION: Blurred vision. COMPARISON: December 2019  TECHNIQUE:   5 mm axial scans from the skull base to the vertex. Our CT scanners use one or more of the following:  Automated exposure control, adjustment of the mA and or kV according to patient size, iterative reconstruction. FINDINGS:  No acute intraparenchymal hemorrhage or abnormal extra-axial fluid collection. The ventricles are normal size. Right occipital infarct has evolved which now demonstrates encephalomalacia. Several small bilateral lacunar cerebellar hemispheres. No midline shift or mass effect. Included portion of the paranasal sinuses and orbits grossly unremarkable. IMPRESSION:  Negative for acute intracranial abnormality. Chronic changes. HEAD CT with PERFUSION IMAGING INDICATION:  Blurred vision.  Multiple axial images obtained through the brain without intravenous contrast. CT perfusion imaging of the brain was then performed after the administration of intravenous contrast.  Perfusion maps and perfusion analysis output were generated using the RAPID perfusion processing software algorithm. Radiation dose reduction techniques were used for this study: All CT scans performed at this facility use one or all of the following: Automated exposure control, adjustment of the mA and/or kVp according to patient's size, iterative reconstruction. FINDINGS: VIZ Output Values: CBF < 30% volume:  0 ml   (core infarction volume greater than 50 cc associated with poor outcomes) Tmax > 6 seconds: 2 ml Tmax/CBF Mismatch Volume: 2 ml Tmax/CBF Mismatch Ratio: NA Hypoperfusion Intensity Ratio (Tmax > 10 seconds/Tmax > 6 seconds): 0.0  (values greater than 0.5 associated with poor outcome) Tmax > 10 seconds Volume: 0 ml   (volume greater than 100 mL is associated with poor outcome) IMPRESSION: No CT evidence of significant acute intracranial perfusion abnormality. Please note that the determination of patient treatment is not based solely upon imaging factors or calculation values. Management of ischemia is at the discretion of the primary physician and is based upon a combination of clinical and imaging data, along other factors. Cta Code Neuro Head And Neck W Cont    Result Date: 12/1/2020  HISTORY: 75years of age Female with acute neurologic deficits. [facility] EXAM: CTA CODE NEURO HEAD AND NECK W CONT. Radiation reduction dose techniques were used for the study. Our CT scanner use one or all of the following- Automated exposure control, adjustment of the mA and/or KV according the patient size, iterative reconstruction. 3-D multiplanar reformations were performed at the workstation. All carotid artery stenosis percentages are based upon NASCET criteria if appropriate. Per the CT technologist, this study was analyzed by the [address] ai algorithm. COMPARISON: Carotid duplex exam on 5/19/2020. No prior comparison vasculature imaging of the head available.  Noncontrast CT head exam and CT perfusion exams on 12/1/2020. MR brain exam on 12/22/2019. 12/22/2019 and MR brain exam demonstrated findings of an acute to subacute infarct within the right temporoparietal occipital region. Same day noncontrast CT head exam demonstrated findings of encephalomalacia defect in the right temporal occipital region from prior previously noted infarct. FINDINGS: VASCULAR FINDINGS: Cervical CTA: There is a three-vessel branching pattern of the aortic arch. The right subclavian artery is patent where visualized. The left subclavian artery is patent where visualized. Minimal atherosclerotic disease of the aortic arch. The right subclavian artery is poorly evaluated at the origin due to motion artifact but is patent and unremarkable beyond this level. The right external carotid artery is patent. Trace atherosclerotic disease of the right carotid bulb without stenosis. No significant atherosclerotic disease or stenosis of the proximal right ICA. The cervical right ICA is diffusely patent. The vertebral arteries are codominant. The cervical right vertebral artery is diffusely patent. The left common carotid artery is patent. The left external carotid artery is patent. Trace atherosclerotic disease of the left carotid bulb without stenosis. Mild atherosclerotic disease of the proximal left ICA without significant stenosis. The cervical left ICA is diffusely patent. The cervical left vertebral artery is diffusely patent. No significant stenosis or occlusion in major cervical arterial vasculature. Cranial CTA: Intracranial right internal carotid artery is patent. Intracranial left internal carotid artery is patent. 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 Right and left A1 segments are patent. Bilateral A2 and A3 segments are patent. Right M1 is patent. Major proximal right MCA branches beyond the bifurcation are patent. Left M1 is patent.  Major proximal left MCA branches beyond the bifurcation are patent. Intracranial right vertebral artery is patent and diminutive. Intracranial left vertebral artery is patent and diminutive. Basilar artery is diffusely patent and small in caliber. Right posterior cerebral artery is patent with a hypoplastic P1 segment. Right posterior communicating artery is patent. Left posterior communicating artery is patent and has a fetal origin. No proximal vessel occlusion. NONVASCULAR FINDINGS: Head: There is a similar brain parenchymal pattern to prior with redemonstration of the findings of the previously noted right temporal occipital lobe infarct with associated subluxation defect. Orbital structures demonstrate findings of bilateral lens replacements. Calvarial and maxillofacial soft tissues are without evidence of significant acute abnormality. Neck: There is a 11 mm peripherally enhancing nodule within the posterior right thyroid lobe. No evidence of significant cervical or upper thoracic adenopathy. The visualized upper lungs are with prominent centrilobular emphysematous changes. There are areas prominent focal consolidation. OSSEOUS STRUCTURES: Mastoid air cells are well aerated. Paranasal sinuses without significant mucoperiosteal thickening. There are multilevel degenerative changes of the cervical spine primarily involving facet arthropathy. IMPRESSION: 1. 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 Recommend continued follow-up. No proximal vessel occlusion or significant stenosis in the major intracranial arterial vasculature. 2. Negative CTA neck exam for significant stenosis or occlusion in the major cervical arterial vasculature. 3. Emphysematous lungs. 4. 11 mm right thyroid lobe nodule. This could be further evaluated with a nonurgent thyroid ultrasound exam. 5. Redemonstration of findings of prior right temporal occipital lobe infarct.     Ct Code Neuro Head Wo Contrast    Result Date: 12/1/2020  CT HEAD WITHOUT CONTRAST. INDICATION: Blurred vision. COMPARISON: December 2019  TECHNIQUE:   5 mm axial scans from the skull base to the vertex. Our CT scanners use one or more of the following:  Automated exposure control, adjustment of the mA and or kV according to patient size, iterative reconstruction. FINDINGS:  No acute intraparenchymal hemorrhage or abnormal extra-axial fluid collection. The ventricles are normal size. Right occipital infarct has evolved which now demonstrates encephalomalacia. Several small bilateral lacunar cerebellar hemispheres. No midline shift or mass effect. Included portion of the paranasal sinuses and orbits grossly unremarkable. IMPRESSION:  Negative for acute intracranial abnormality. Chronic changes. HEAD CT with PERFUSION IMAGING INDICATION:  Blurred vision. Multiple axial images obtained through the brain without intravenous contrast. CT perfusion imaging of the brain was then performed after the administration of intravenous contrast.  Perfusion maps and perfusion analysis output were generated using the RAPID perfusion processing software algorithm. Radiation dose reduction techniques were used for this study: All CT scans performed at this facility use one or all of the following: Automated exposure control, adjustment of the mA and/or kVp according to patient's size, iterative reconstruction. FINDINGS: VIZ Output Values: CBF < 30% volume:  0 ml   (core infarction volume greater than 50 cc associated with poor outcomes) Tmax > 6 seconds: 2 ml Tmax/CBF Mismatch Volume: 2 ml Tmax/CBF Mismatch Ratio: NA Hypoperfusion Intensity Ratio (Tmax > 10 seconds/Tmax > 6 seconds): 0.0  (values greater than 0.5 associated with poor outcome) Tmax > 10 seconds Volume: 0 ml   (volume greater than 100 mL is associated with poor outcome) IMPRESSION: No CT evidence of significant acute intracranial perfusion abnormality.  Please note that the determination of patient treatment is not based solely upon imaging factors or calculation values. Management of ischemia is at the discretion of the primary physician and is based upon a combination of clinical and imaging data, along other factors.     )  Review and summarize past medical records: yes  Discuss the patient with other providers: yes    Risk of Complications, Morbidity, and/or Mortality  Presenting problems: high  Diagnostic procedures: moderate  Management options: moderate  General comments: CRITICAL CARE Documentation: This patient is critically ill and there is a high probability of of imminent or life threatening deterioration in the patient's condition without immediate management. The nature of the patient's clinical problem is: Stroke/CVA    I have spent 30 minutes in direct patient care, documentation, review of labs/xrays/old records, discussion with Staff, consultants, patient . The time involved in the performance of separately reportable procedures was not counted toward critical care time.      Amanda Feldman MD; 12/1/2020 @12:49 PM        Patient Progress  Patient progress: stable         Procedures

## 2020-12-01 NOTE — PROGRESS NOTES
TRANSFER - IN REPORT:    Verbal report received from Molly RN(name) on Stuart Brewer  being received from Med/Surg(unit) for change in patient condition(NIH from 2 to 7)      Report consisted of patients Situation, Background, Assessment and   Recommendations(SBAR). Information from the following report(s) SBAR, Kardex, ED Summary, MAR, Recent Results, Cardiac Rhythm NSR and Dual Neuro Assessment was reviewed with the receiving nurse. Opportunity for questions and clarification was provided. Assessment completed upon patients arrival to unit and care assumed.

## 2020-12-01 NOTE — ED TRIAGE NOTES
Patient ambulatory to room.   Patient co blurred vision that started today around 8am.  Patient alert oriented x4 no facial droop, no slurred speech

## 2020-12-01 NOTE — PROGRESS NOTES
RAPID RESPONSE  CRITICAL CARE OUTREACH NURSE NOTE      Pt was seen and examined following Rapid Response. Pt status/focused assessment upon arrival to Rapid Response as follows:    SITUATION:  On arrival to  found to be cyanotic, back arching, agonal breathing. NEURO:  Patient is guttural noise and unresponsive  RESP:  Respiratory status is agonal  CARDIAC: Heart rate and rhythm are sinus to sinus tach   Monitor shows ST  GI: Relevant GI issues include wdl  : Relevant  issues include has not voided  Other:     LATEST VITAL SIGNS :  MEWS Score: 1 (12/01/20 1101)  Vitals:    12/01/20 1101 12/01/20 1441 12/01/20 1547   BP: (!) 174/90 (!) 166/92 (!) 147/81   Pulse: 81 73 89   Resp: 16 17 22   Temp: 97.9 °F (36.6 °C) 98.2 °F (36.8 °C)    SpO2: 96% 95% 96%   Weight: 61.2 kg (135 lb)           LAB WORK PENDING/SENT DURING RAPID RESPONSE:     Labs and/or test ordered were MRI, CT of head, add on some labs    Primary RN at bedside: Patient's nurse is Mendel Benton  RT at bedside: The primary RT is Randy Hodges MD at bedside: The MD in charge is Oscar      Interventions completed during Rapid Response:     Interventions completed or ordered during the rapid response were:  Assist respirations and travel to radiology. Post Rapid Response plan of care:  Finish all of the ordered scans. Patient is starting to struggle in the CT scan and grabbing at things.   Has word salad and PEARRL at 4

## 2020-12-01 NOTE — PROGRESS NOTES
SPEECH PATHOLOGY NOTE:    Speech therapy consult received and appreciated. Attempted to see patient this PM for bedside swallow evaluation. Patient is currently off the floor for MRI. Will re-attempt at later time/date as patient becomes available. Germania Steiner MS, CCC-SLP         ADDENDUM:    Attempted to see patient on 2nd occasions; however, MD at bedside. Will check back at later time/date as patient is available and as schedule permits.       Germania Steiner MS, CCC-SLP

## 2020-12-02 LAB
ANION GAP SERPL CALC-SCNC: 5 MMOL/L (ref 7–16)
BUN SERPL-MCNC: 8 MG/DL (ref 8–23)
CALCIUM SERPL-MCNC: 8.7 MG/DL (ref 8.3–10.4)
CHLORIDE SERPL-SCNC: 104 MMOL/L (ref 98–107)
CO2 SERPL-SCNC: 30 MMOL/L (ref 21–32)
CREAT SERPL-MCNC: 0.61 MG/DL (ref 0.6–1)
GLUCOSE SERPL-MCNC: 90 MG/DL (ref 65–100)
MM INDURATION POC: NORMAL (ref 0–5)
POTASSIUM SERPL-SCNC: 3 MMOL/L (ref 3.5–5.1)
PPD POC: NORMAL
SODIUM SERPL-SCNC: 139 MMOL/L (ref 136–145)

## 2020-12-02 PROCEDURE — 97165 OT EVAL LOW COMPLEX 30 MIN: CPT

## 2020-12-02 PROCEDURE — 95816 EEG AWAKE AND DROWSY: CPT | Performed by: FAMILY MEDICINE

## 2020-12-02 PROCEDURE — 93306 TTE W/DOPPLER COMPLETE: CPT

## 2020-12-02 PROCEDURE — 80048 BASIC METABOLIC PNL TOTAL CA: CPT

## 2020-12-02 PROCEDURE — 92610 EVALUATE SWALLOWING FUNCTION: CPT

## 2020-12-02 PROCEDURE — 36415 COLL VENOUS BLD VENIPUNCTURE: CPT

## 2020-12-02 PROCEDURE — 97162 PT EVAL MOD COMPLEX 30 MIN: CPT

## 2020-12-02 PROCEDURE — 99221 1ST HOSP IP/OBS SF/LOW 40: CPT | Performed by: PSYCHIATRY & NEUROLOGY

## 2020-12-02 PROCEDURE — 74011250637 HC RX REV CODE- 250/637: Performed by: FAMILY MEDICINE

## 2020-12-02 PROCEDURE — 65610000001 HC ROOM ICU GENERAL

## 2020-12-02 PROCEDURE — 74011250636 HC RX REV CODE- 250/636: Performed by: FAMILY MEDICINE

## 2020-12-02 PROCEDURE — 97535 SELF CARE MNGMENT TRAINING: CPT

## 2020-12-02 RX ORDER — POTASSIUM CHLORIDE 20 MEQ/1
40 TABLET, EXTENDED RELEASE ORAL 2 TIMES DAILY
Status: DISCONTINUED | OUTPATIENT
Start: 2020-12-02 | End: 2020-12-03 | Stop reason: HOSPADM

## 2020-12-02 RX ORDER — POTASSIUM CHLORIDE 14.9 MG/ML
20 INJECTION INTRAVENOUS
Status: DISCONTINUED | OUTPATIENT
Start: 2020-12-02 | End: 2020-12-02

## 2020-12-02 RX ORDER — MAGNESIUM SULFATE HEPTAHYDRATE 40 MG/ML
2 INJECTION, SOLUTION INTRAVENOUS ONCE
Status: COMPLETED | OUTPATIENT
Start: 2020-12-02 | End: 2020-12-02

## 2020-12-02 RX ADMIN — APIXABAN 5 MG: 5 TABLET, FILM COATED ORAL at 08:29

## 2020-12-02 RX ADMIN — POTASSIUM CHLORIDE 40 MEQ: 1500 TABLET, EXTENDED RELEASE ORAL at 08:29

## 2020-12-02 RX ADMIN — VITAMIN D, TAB 1000IU (100/BT) 1 TABLET: 25 TAB at 08:29

## 2020-12-02 RX ADMIN — POTASSIUM CHLORIDE 40 MEQ: 1500 TABLET, EXTENDED RELEASE ORAL at 21:46

## 2020-12-02 RX ADMIN — APIXABAN 5 MG: 5 TABLET, FILM COATED ORAL at 21:46

## 2020-12-02 RX ADMIN — PANTOPRAZOLE SODIUM 40 MG: 40 TABLET, DELAYED RELEASE ORAL at 08:29

## 2020-12-02 RX ADMIN — MAGNESIUM SULFATE HEPTAHYDRATE 2 G: 40 INJECTION, SOLUTION INTRAVENOUS at 08:30

## 2020-12-02 RX ADMIN — SODIUM CHLORIDE 250 ML: 900 INJECTION, SOLUTION INTRAVENOUS at 14:14

## 2020-12-02 NOTE — PROGRESS NOTES
Problem: Mobility Impaired (Adult and Pediatric)  Goal: *Acute Goals and Plan of Care (Insert Text)  Description: DISCHARGE GOALS :  (1.)Ms. Purnima Souza will move from supine to sit and sit to supine  with INDEPENDENT. (2.)Ms. Purnima Souza will transfer from bed to chair and chair to bed with SUPERVISION using the least restrictive device. (3.)Ms. Purnima Souza will ambulate with SUPERVISION for 200 feet with the least restrictive device. 4) pt able to go up & down 2 steps with right rail & supervision. Outcome: Progressing Towards Goal     PHYSICAL THERAPY: Initial Assessment and AM 12/2/2020  INPATIENT: PT Visit Days : 1  Payor: Aakash Oconnor / Plan: [address] [place] [address] HMO / Product Type: [employer] /       NAME/AGE/GENDER: Jordan Patterson is a 75 y.o. female   PRIMARY DIAGNOSIS: Visual changes [H53.9]  Unresponsive [R41.89] Visual changes Visual changes        ICD-10: Treatment Diagnosis:    Generalized Muscle Weakness (M62.81)  Other lack of cordination (R27.8)  Difficulty in walking, Not elsewhere classified (R26.2)   Precaution/Allergies:  Atorvastatin      ASSESSMENT:     Ms. Purnima Souza presents with loss of left peripheral vision along with mildly unsteady gait transfers. This pt will benefit from follow up therapy to help her return to her prior level of function. This pt does not appear to have a good DC plan if assist is needed in the home setting.     This section established at most recent assessment   PROBLEM LIST (Impairments causing functional limitations):  Decreased Strength  Decreased ADL/Functional Activities  Decreased Transfer Abilities  Decreased Ambulation Ability/Technique  Decreased Balance  Decreased Activity Tolerance  Decreased Crawford with Home Exercise Program   INTERVENTIONS PLANNED: (Benefits and precautions of physical therapy have been discussed with the patient.)  Balance Exercise  Bed Mobility  Gait Training  Neuromuscular Re-education/Strengthening  Therapeutic Activites  Therapeutic Exercise/Strengthening  Transfer Training     TREATMENT PLAN: Frequency/Duration: daily for duration of hospital stay  Rehabilitation Potential For Stated Goals: Good     REHAB RECOMMENDATIONS (at time of discharge pending progress):    Placement: It is my opinion, based on this patient's performance to date, that Ms. Ary Fuentes may benefit from participating in 1-2 additional therapy sessions in order to continue to assess for rehab potential and then make recommendation for disposition at discharge. Equipment: To be determined              HISTORY:   History of Present Injury/Illness (Reason for Referral):  Patient is a 75 y.o. female who presented to the ED for cc difficulty with vision since this AM at 8:00. No TPA given. NIH score 3. Hx of paroxysmal a fib on Eliquis, vertigo,  Ascending aortic anerysm followed by Dr. Norman Epperson, COPD, HLD, HTN, and CVA Dec 2019 in right occipital lobe. CT head with no acute changes. CT perfusion study negative. CTA head neck showed 3 mm intracranial left internal carotid artery aneurysm. No proximal vessel occlusion or significant stenosis in the major intracranial arterial vasculature. Negative CTA neck exam for significant stenosis or occlusion in the major cervical arterial vasculature. 11 mm right thyroid lobe nodule. This could be further evaluated with a nonurgent thyroid ultrasound exam.  Past Medical History/Comorbidities:   Ms. 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   Ms. Ary Fuentes  has a past surgical history that includes hx carpal tunnel release (Bilateral, 2004, 2005); hx tonsillectomy; hx cataract removal (07/10/2019); and hx tubal ligation. 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 who has & on 02)  Support Systems: Family member(s)  Patient Expects to be Discharged to[de-identified] Private residence  Current DME Used/Available at Home: None  Tub or Shower Type: (P) Tub/Shower combination  Prior Level of Function/Work/Activity:  Pt was independent without an assistive device prior to this admission  Personal Factors: Other factors that influence how disability is experienced by the patient:  current & PMH   Number of Personal Factors/Comorbidities that affect the Plan of Care: 3+: HIGH COMPLEXITY   EXAMINATION:   Most Recent Physical Functioning:   Gross Assessment:  AROM: Generally decreased, functional(all limbs & core)  Strength: Generally decreased, functional(all limbs & core)  Coordination: Generally decreased, functional(all limbs & core)                  Balance:  Sitting: Intact; Without support  Standing: Impaired; Without support Bed Mobility:  Supine to Sit: Stand-by assistance  Sit to Supine: Stand-by assistance  Scooting: Stand-by assistance       Transfers:  Sit to Stand: Stand-by assistance  Stand to Sit: Stand-by assistance  Bed to Chair: Stand-by assistance  Gait:     Speed/Prabha: Delayed  Step Length: Left shortened;Right shortened  Gait Abnormalities: Decreased step clearance(mild sway)  Distance (ft): 20 Feet (ft)  Assistive Device: (none)  Ambulation - Level of Assistance: Stand-by assistance   Functional Mobility:         Gait/Ambulation:  sba        Transfers:  sba        Bed Mobility:  sba   Body Structures Involved:  Nerves  Muscles Body Functions Affected:  Neuromusculoskeletal  Movement Related Activities and Participation Affected:  General Tasks and Demands  Mobility   Number of elements that affect the Plan of Care: 4+: HIGH COMPLEXITY   CLINICAL PRESENTATION:   Presentation: Evolving clinical presentation with changing clinical characteristics: MODERATE COMPLEXITY   CLINICAL DECISION MAKIN [facility] Inpatient Short Form  How much difficulty does the patient currently have. .. Unable A Lot A Little None   1. Turning over in bed (including adjusting bedclothes, sheets and blankets)? [] 1   [] 2   [x] 3   [] 4   2. Sitting down on and standing up from a chair with arms ( e.g., wheelchair, bedside commode, etc.)   [] 1   [] 2   [x] 3   [] 4   3. Moving from lying on back to sitting on the side of the bed? [] 1   [] 2   [x] 3   [] 4   How much help from another person does the patient currently need. .. Total A Lot A Little None   4. Moving to and from a bed to a chair (including a wheelchair)? [] 1   [] 2   [x] 3   [] 4   5. Need to walk in hospital room? [] 1   [] 2   [x] 3   [] 4   6. Climbing 3-5 steps with a railing? [x] 1   [] 2   [] 3   [] 4   © , Trustees of [address] [place] [address], under license to [employer]. All rights reserved      Score:  Initial: 16 Most Recent: X (Date: -- )    Interpretation of Tool:  Represents activities that are increasingly more difficult (i.e. Bed mobility, Transfers, Gait). Medical Necessity:     Patient is expected to demonstrate progress in   strength, balance, coordination, and functional technique   to   decrease assistance required with bed mobility, transfers & gait  . Reason for Services/Other Comments:  Patient continues to require skilled intervention due to   Pt unsteady with functional mobility  .    Use of outcome tool(s) and clinical judgement create a POC that gives a: Questionable prediction of patient's progress: MODERATE COMPLEXITY            TREATMENT:   (In addition to Assessment/Re-Assessment sessions the following treatments were rendered)   Pre-treatment Symptoms/Complaints:  \" I feel unsteady \"  Pain: Initial: numeric scale  Pain Intensity 1: 0  Post Session: 0/10     Assessment/Reassessment only, no treatment provided today    Braces/Orthotics/Lines/Etc:   IV  ICU monitors  Treatment/Session Assessment:    Response to Treatment:  tolerated well, very cooperative  Interdisciplinary Collaboration:   Registered Nurse  After treatment position/precautions:   Up in chair  Bed/Chair-wheels locked  Call light within reach  RN notified   Compliance with Program/Exercises: Will assess as treatment progresses  Recommendations/Intent for next treatment session: \"Next visit will focus on reduction in assistance provided\".   Total Treatment Duration:  PT Patient Time In/Time Out  Time In: 1142  Time Out: [address], PT

## 2020-12-02 NOTE — CONSULTS
Consult    Patient: Kimberley Molina MRN: [id number]  SSN: [id number]    YOB: 1945  Age: 75 y.o. Sex: female        Assessment:     1. Chronic cerebellar microinfarctions. Embolic versus secondary to hypertension (small vessel disease). No evidence of acute cerebral infarction. Second CT angiogram is most consistent with global cerebral hypoperfusion. It sounds as if the second rapid response/Code S is more consistent with seizure versus syncope. EEG is normal    2. Complex recurrent visual distortions possibly consistent with retinal migraine. 3.  3 mm left internal carotid artery intracranial aneurysm, stable    Hospital Problems  Date Reviewed: 10/6/2020          Codes Class Noted POA    * (Principal) Visual changes ICD-10-CM: H53.9  ICD-9-CM: 368.9  12/1/2020 Unknown        Thyroid nodule ICD-10-CM: E04.1  ICD-9-CM: 241.0  12/1/2020 Unknown        Aneurysm, carotid artery, internal ICD-10-CM: I67.1  ICD-9-CM: 437.3  12/1/2020 Unknown        Unresponsive ICD-10-CM: R41.89  ICD-9-CM: 780.09  12/1/2020 Unknown        Paroxysmal atrial fibrillation (HCC) ICD-10-CM: I48.0  ICD-9-CM: 427.31  5/18/2020 Unknown        Hypertension ICD-10-CM: I10  ICD-9-CM: 401.9  Unknown Yes        GERD (gastroesophageal reflux disease) ICD-10-CM: K21.9  ICD-9-CM: 530.81  Unknown Yes        Hyperlipemia ICD-10-CM: E78.5  ICD-9-CM: 272.4  Unknown Yes        Stage 1 mild COPD by GOLD classification ([address] ICD-10-CM: J44.9  ICD-9-CM: 325  Unknown Yes        Thoracic ascending aortic aneurysm ([address] ICD-10-CM: I71.2  ICD-9-CM: 209. 2  Unknown Yes              Plan:     Consider prolonged EKG monitoring with either external or implanted looping recorder rule out sick sinus syndrome. Staff message sent to patient's cardiologist Dr. George Lion. Neuro-ophthalmology consult after discharge with Dr. Kamari Gu at [facility] recurrent complex visual distortions of uncertain etiology.   Questionable persistent monocular scotoma left eye\"    Follow-up with me in my office after discharge    Subjective:      Jax Miranda is a 75 y.o. female who is being seen for evaluation of intermittent visual difficulty. The visual disturbance is described as left sided scotoma. The patient states that the first episode in Dec 2019. She was evaluated at [facility] and ultimately referred to [address] [place] [address] [address] who diagnosed PAF. This has been treated with Eliquis. The patient states that she then had an episode 3-4 weeks ago where she had a complex visual disturbance feeling like \"everything was twisted\". This lasted for a couple of days. She was seen in the ED at [facility] where she was evaluated and released. She did well untl 8 am on 1 Dec when she had a recurence of left sided scotoma. She has a slight headache with this as well. THis is located in the left parietal area and is described as a dull pain. The patient denies any prior history of migraine. The patient was made a stroke alert and was seen by Asa. An NIH SS of 3 was recorded for Complete hemianopsia and missing one orientation question. CT/CTA and CTP were performed and were essentially negative. A follow up MRI of the brain revealed old bilateral cerebellar infarctions. Subsequently the patient states that she does't remember what happened. She apparently had come clinical worsening with NIHSS increasing to 7. The patient may have seen Asa a second time but there is no documentation of such. The details of the increase in the NIHSS are not available to me but she she apparently did become unresponsive. A repeat CTP was performed which was concerning for slow flow in the left posterior fossa. Because of a history of an aortic aneurysm a CTA chest was performed wihich showed a 4 cm aoritic aneurysm but no dissection. The patient states that she gradually regained her faculties although she is unsure of what time that was.      According to the chart there was a concern regarding a seizure with the second code S. The patient has no history of seizures previously. She denies any presyncopal symptoms. According to the patient's ICU nurse a rapid response was called  on 21/1 and she was said to be  'Melvin'ing down\" according to the rapid response nurse. The rapid response was converted to a code S shortly there after and ursing notes document moderate hypertension and HR 89 shortly after the code S was called (8227). She was described as \"getting stiff\" and cyanotic. At some point she did receive Ativan for procedural sedation not for seizure. .     On arrival in the ICU an hour and a half later the patient was drowsy and had slurred speech. She had regained full strength and was hemodynamically stable. Since admission to the ICU the patient has had several (3) brief episodes of bradycardia in the 40's usually during  to sleep. The patient is followed by Oneida Henley at [facility].                             Past Medical History:   Diagnosis Date    Acute ischemic stroke ([address] 12/22/2019    Aortic atherosclerosis ([address]     Dr. Lo Dia following     Arthritis     Ascending aortic aneurysm ([address]     4.2 cm, Followed by Dr. 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    Unilateral primary osteoarthritis, right hip 2/14/2020    Vitamin D deficiency     10 + yrs ago per pt     Past Surgical History:   Procedure Laterality Date    HX CARPAL TUNNEL RELEASE Bilateral 2004, 2005    HX CATARACT REMOVAL  07/10/2019    Bilateral     HX TONSILLECTOMY      HX TUBAL LIGATION        Family History   Problem Relation Age of Onset    Diabetes Mother     Cancer Mother         cervical, thyroid w/ thyroidectomy     Stroke Mother     Heart Attack Father     Other Father         collapsed lung; tobacco abuse     Diabetes Sister     COPD Sister     Diabetes Brother     Cancer Brother         liver     Social History     Tobacco Use    Smoking status: Former Smoker     Packs/day: 0.50     Years: 30.00     Pack years: 15.00     Last attempt to quit: 7/20/2005     Years since quitting: 15.3    Smokeless tobacco: Never Used    Tobacco comment: 10-11 yrs ago quit   Substance Use Topics    Alcohol use:  Yes     Alcohol/week: 1.0 standard drinks     Types: 1 Glasses of wine per week      Current Facility-Administered Medications   Medication Dose Route Frequency Provider Last Rate Last Dose    NUTRITIONAL SUPPORT ELECTROLYTE PRN ORDERS   Does Not Apply PRN Christopher Interiano MD        potassium chloride (K-DUR, KLOR-CON) SR tablet 40 mEq  40 mEq Oral BID Joy Coleman, DO   40 mEq at 12/02/20 1605    acetaminophen (TYLENOL) tablet 650 mg  650 mg Oral Q6H PRN Joy Coleman, DO        Or    acetaminophen (TYLENOL) suppository 650 mg  650 mg Rectal Q6H PRN Joy Coleman, DO        polyethylene glycol (MIRALAX) packet 17 g  17 g Oral DAILY PRN Joy Coleman, DO        ondansetron [facility]) injection 4 mg  4 mg IntraVENous Q6H PRN Joy Coleman, DO        cholecalciferol (VITAMIN D3) (1000 Units /25 mcg) tablet 1 Tab  1,000 Units Oral DAILY Ojy Coleman, DO   1 Tab at 12/02/20 4601    apixaban (ELIQUIS) tablet 5 mg  5 mg Oral Q12H Joy Coleman, DO   5 mg at 12/02/20 1858    pantoprazole (PROTONIX) tablet 40 mg  40 mg Oral ACB Joy Coleman, DO   40 mg at 12/02/20 7552    tuberculin injection 5 Units  5 Units IntraDERMal Angelita Dumont, DO   5 Units at 12/01/20 2127    atorvastatin (LIPITOR) tablet 40 mg  40 mg Oral QHS Mario Alberto Zoraida Leary DO   Stopped at 12/01/20 2200    labetaloL (NORMODYNE;TRANDATE) injection 5 mg  5 mg IntraVENous Q10MIN PRN Krish Avitia DO        LORazepam (ATIVAN) injection 1 mg  1 mg IntraVENous Q2H PRN Krish Avitia DO            Allergies   Allergen Reactions    Atorvastatin Unknown (comments)     Patient states had a bad reaction to this medicine. But cant remember. Review of Systems:    12 point review of systems otherwise negative    Objective:     Vitals:    12/02/20 1300 12/02/20 1400 12/02/20 1500 12/02/20 1600   BP: 119/64 108/68 105/61 105/68   Pulse: 66 (!) 56 65 63   Resp: 23 24 15 17   Temp:   98.6 °F (37 °C)    SpO2: 92% 96% 93% 93%   Weight:            Physical Exam:      General: well nourished, appears stated age    Eyes: no proptosis or exophthalmos; conjunctivae clear, sclerae non-icteric    Chest: clear to auscultation    Cardiac: normal S1 S2; no murmurs gallop or rubs    Neurological:    MSE: alert, oriented times 3; fluent speech; no paraphasic errors; follows commands without difficulty    CN 2: visual fields full; no afferent pupillary defect; VA not checked; fundoscopic exam ... CN 3,4,6: Pupils symmetrical in size, reactive to light directly and consensually; no ptosis; full versions and ductions  CN 5: facial sensation intact to light touch and pin prick. Corneal reflex . ..   CN 7: Symmetrical facial tone and movements  CN 8 responds to spoken voice  CN 9,10; palate symmetrical gag intact  CN 11: head turn and shoulder shrug intact  CN 12: tongue midline without atrophy or fasiculations    Motor:  Power 5/5 UE and LE proximal to distal  Fine motor movements symmetrical  Tone normal  Atrophy: absent  Gait: symmetrical arm swing, rises on heels and toes    Cerebellar:  Finger to nose; heel to shin intact  Tandem intact    Sensory  Romberg negative  Intact to primary modalities in all 4 extremities    Reflexes    Symmetrical and normally active at 2+ in UE and LE  Plantar response flexor bilaterally    Recent Results (from the past 24 hour(s))   DRUG SCREEN, URINE    Collection Time: 12/01/20  5:16 PM   Result Value Ref Range    PCP(PHENCYCLIDINE) Negative      BENZODIAZEPINES Negative      COCAINE Negative      AMPHETAMINES Negative      METHADONE Negative      THC (TH-CANNABINOL) Negative      OPIATES Negative      BARBITURATES Negative     GLUCOSE, POC    Collection Time: 12/01/20  5:31 PM   Result Value Ref Range    Glucose (POC) 131 (H) 65 - 405 mg/dL   METABOLIC PANEL, BASIC    Collection Time: 12/02/20  4:22 AM   Result Value Ref Range    Sodium 139 136 - 145 mmol/L    Potassium 3.0 (L) 3.5 - 5.1 mmol/L    Chloride 104 98 - 107 mmol/L    CO2 30 21 - 32 mmol/L    Anion gap 5 (L) 7 - 16 mmol/L    Glucose 90 65 - 100 mg/dL    BUN 8 8 - 23 MG/DL    Creatinine 0.61 0.6 - 1.0 MG/DL    GFR est AA >60 >60 ml/min/1.73m2    GFR est non-AA >60 >60 ml/min/1.73m2    Calcium 8.7 8.3 - 10.4 MG/DL       Lab Results   Component Value Date/Time    Cholesterol, total 198 12/01/2020 11:08 AM    HDL Cholesterol 68 (H) 12/01/2020 11:08 AM    LDL, calculated 107.2 (H) 12/01/2020 11:08 AM    VLDL, calculated 22.8 12/01/2020 11:08 AM    Triglyceride 114 12/01/2020 11:08 AM    CHOL/HDL Ratio 2.9 12/01/2020 11:08 AM        Lab Results   Component Value Date/Time    Hemoglobin A1c 5.8 12/01/2020 11:08 AM        CT Results (most recent):  75years of age Female with acute neurologic deficits. .     EXAM: CTA CODE NEURO HEAD AND NECK W CONT. Radiation reduction dose techniques  were used for the study. Our CT scanner use one or all of the following-  Automated exposure control, adjustment of the mA and/or KV according the patient  size, iterative reconstruction. 3-D multiplanar reformations were performed at  the workstation. All carotid artery stenosis percentages are based upon NASCET  criteria if appropriate. Per the CT technologist, this study was analyzed by the  [address] ai algorithm.      COMPARISON: Carotid duplex exam on 5/19/2020. No prior comparison vasculature  imaging of the head available. Noncontrast CT head exam and CT perfusion exams  on 12/1/2020. MR brain exam on 12/22/2019.     12/22/2019 and MR brain exam demonstrated findings of an acute to subacute  infarct within the right temporoparietal occipital region.     Same day noncontrast CT head exam demonstrated findings of encephalomalacia  defect in the right temporal occipital region from prior previously noted  infarct.     FINDINGS:   VASCULAR FINDINGS:  Cervical CTA:  There is a three-vessel branching pattern of the aortic arch. The right  subclavian artery is patent where visualized. The left subclavian artery is  patent where visualized. Minimal atherosclerotic disease of the aortic arch.     The right subclavian artery is poorly evaluated at the origin due to motion  artifact but is patent and unremarkable beyond this level. The right external  carotid artery is patent.     Trace atherosclerotic disease of the right carotid bulb without stenosis. No  significant atherosclerotic disease or stenosis of the proximal right ICA. The  cervical right ICA is diffusely patent.     The vertebral arteries are codominant. The cervical right vertebral artery is  diffusely patent.     The left common carotid artery is patent. The left external carotid artery is  patent.     Trace atherosclerotic disease of the left carotid bulb without stenosis. Mild  atherosclerotic disease of the proximal left ICA without significant stenosis. The cervical left ICA is diffusely patent.     The cervical left vertebral artery is diffusely patent.     No significant stenosis or occlusion in major cervical arterial vasculature.     Cranial CTA:  Intracranial right internal carotid artery is patent. Intracranial left internal  carotid artery is patent.  There is a inferiorly oriented 3 mm blister aneurysm  of the proximal supraclinoid left ICA.     Right and left A1 segments are patent. Bilateral A2 and A3 segments are patent.     Right M1 is patent. Major proximal right MCA branches beyond the bifurcation are  patent. Left M1 is patent. Major proximal left MCA branches beyond the  bifurcation are patent.     Intracranial right vertebral artery is patent and diminutive. Intracranial left  vertebral artery is patent and diminutive. Basilar artery is diffusely patent  and small in caliber.     Right posterior cerebral artery is patent with a hypoplastic P1 segment. Right  posterior communicating artery is patent. Left posterior communicating artery is  patent and has a fetal origin.     No proximal vessel occlusion.     NONVASCULAR FINDINGS:  Head:  There is a similar brain parenchymal pattern to prior with redemonstration of  the findings of the previously noted right temporal occipital lobe infarct with  associated subluxation defect. Orbital structures demonstrate findings of  bilateral lens replacements. Calvarial and maxillofacial soft tissues are  without evidence of significant acute abnormality.     Neck:  There is a 11 mm peripherally enhancing nodule within the posterior right  thyroid lobe. No evidence of significant cervical or upper thoracic adenopathy. The visualized upper lungs are with prominent centrilobular emphysematous  changes. There are areas prominent focal consolidation.     OSSEOUS STRUCTURES:  Mastoid air cells are well aerated. Paranasal sinuses without significant  mucoperiosteal thickening. There are multilevel degenerative changes of the  cervical spine primarily involving facet arthropathy.     IMPRESSION  IMPRESSION:  1. 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 Recommend  continued follow-up.  No proximal vessel occlusion or significant stenosis in the  major intracranial arterial vasculature.     2. Negative CTA neck exam for significant stenosis or occlusion in the major  cervical arterial vasculature.     3. Emphysematous lungs.     4. 11 mm right thyroid lobe nodule. This could be further evaluated with a  nonurgent thyroid ultrasound exam.       Results from Hospital Encounter encounter on 12/01/20   CTA CHEST ABD PELV W WO CONT    Narrative EXAM: CT angiography of the CHEST, ABDOMEN, AND PELVIS    INDICATION: ascending aortic aneurysm. Now unresponsive. Any signs of  dissection? Benedetta Ou Altered mental status. COMPARISON: CT 12/22/2019. TECHNIQUE: Technique: Axial images of the chest, abdomen, and pelvis were  obtained  after the administration of intravenous iodinated contrast media. Contrast was used to best identify solid structures. Images also viewed on an  independent workstation including 3D rendering. Radiation dose reduction techniques were used for this study. Our CT scanners  use one or all of the following: Automated exposure control, adjustment of the  mA and/or kV according to patient size, iterative reconstruction. FINDINGS:    CHEST:    Mediastinum and visualized thyroid: No adenopathy. There is a 1.1 cm nodule in  the right thyroid lobe. Moderate sliding-type hiatal hernia. Heart: Normal in size. Lungs: Moderate to advanced centrilobular emphysema in the lungs. No focal  consolidation or pleural effusions. No pneumothorax. No suspicious pulmonary  nodules are identified. ABDOMEN AND PELVIS:    Nonvascular findings in the abdomen/pelvis: The spleen and pancreas are within  normal limits. No focal hepatic lesions. No biliary duct dilation. Gallbladder  appears within normal limits by CT. No hydronephrosis or renal calculi  identified. The adrenal glands are within normal limits. The ureters appear  within normal limits. The bladder appears relatively patulous. No evidence of  bowel obstruction. Negative for ascites. No free intraperitoneal air. No  inflammatory changes associated with the bowel. No retroperitoneal or peritoneal  adenopathy or mass lesions.  Colonic diverticulosis without evidence of  diverticulitis. Musculoskeletal: L1 compression fracture with approximately 70% height loss, new  since the prior CT 12/22/2019. There is approximately 3 mm of bony retropulsion  which appears to result in mild spinal canal stenosis. Prior right hip  arthroplasty. No destructive bony changes are identified    VASCULAR:    Aorta:  Aneurysmal dilation of the ascending aorta which measures approximately  4.0 x 4.0 cm in diameter, similar to the prior exam. Ectasia of the transverse  portion of the aortic arch without feroz aneurysmal change. The descending  thoracic aorta is not aneurysmal. The abdominal aorta is not aneurysmal. No  high-grade stenosis or occlusion. Moderate diffuse calcific atherosclerosis. Celiac artery: Moderate to high-grade stenosis in the proximal celiac artery. There is hypertrophy of the gastroduodenal artery secondary to collaterals. Superior mesenteric artery: No significant stenosis, occlusion, or aneurysm. Inferior mesenteric artery: No significant stenosis, occlusion, or aneurysm. Renal arteries: No significant stenosis, occlusion, or aneurysm. Iliac and Femoral arteries: No significant stenosis, occlusion, or aneurysm. Moderate tortuosity. Pulmonary arteries: Negative for proximal pulmonary embolus. Impression IMPRESSION:    1. Aneurysmal dilation of the ascending aorta to 4 cm. Negative for dissection. 2.  Moderate emphysematous changes in lungs. Lungs are otherwise clear. 3.  L1 compression fracture with approximately 70% height loss, new since the  prior CT 12/22/2019. There is approximately 3 mm of bony retropulsion which  appears to result in mild spinal canal stenosis. MRI could determine acuity as  clinically indicated. 4.  Moderate sliding-type hiatal hernia. Results for orders placed or performed in visit on 10/06/20   AMB POC EKG ROUTINE W/ 12 LEADS, INTER & REP    Impression    Normal sinus rhythm rate 66.   Poor progression septal Q waves which been noted on prior EKGs. No ST segment changes. Compared to prior EKGs no significant changes occurred   Results for orders placed or performed during the hospital encounter of 10/04/20   EKG, 12 LEAD, INITIAL   Result Value Ref Range    Ventricular Rate 83 BPM    Atrial Rate 83 BPM    P-R Interval 168 ms    QRS Duration 82 ms    Q-T Interval 360 ms    QTC Calculation (Bezet) 423 ms    Calculated P Axis 74 degrees    Calculated R Axis 66 degrees    Calculated T Axis 81 degrees    Diagnosis       Normal sinus rhythm  Septal infarct (cited on or before 15-JUL-2019)  Nonspecific ST abnormality  Abnormal ECG  When compared with ECG of 21-DEC-2019 11:40,  No significant change was found  Confirmed by [email or web address] ([id number]) on 10/4/2020 8:56:40 PM          MRI Results (most recent):  Results from Hospital Encounter encounter on 12/01/20   MRI BRAIN WO CONT    Narrative MRI BRAIN WITHOUT CONTRAST. HISTORY: Slurred speech. Worsening and 8 score. COMPARISON:  MRI 2 hours earlier. Study performed within 24 hours of admission. TECHNIQUE:  Sagittal T1, axial T1, T2, FLAIR, gradient echo, diffusion with ADC  map and coronal FLAIR. FINDINGS:   -Diffusion images: No any areas of restricted diffusion to suggest acute  infarction.    -No midline shift, mass or mass effect. -Gradient echo images: are unremarkable. -FLAIR sequence images: Scattered FLAIR white matter hyperintensities.  -No evidence of acute hemorrhage.    -The lateral ventricles are: normal size.    -The pituitary and parasellar structures: unremarkable on the sagittal T1  images.    -There are normal T2 flow-voids in the major vessels.     -Posterior fossa demonstrates multiple small remote lacunar infarcts.    -The basal ganglia: appear symmetric.    -Orbits: are grossly normal.   -Paranasal sinuses: are clear.  -Other: Old infarct right parietal lobe. .      Impression IMPRESSION: No acute infarction. Chronic small vessel disease.  Multiple small  remote infarcts. US Results (most recent):  Results from Office Visit encounter on 06/13/14   [address] [facility]    Narrative SEE SCANNED FINAL REPORT        Most recent CTA  Results from [facility] encounter on 12/01/20   CTA CHEST ABD PELV W WO CONT    Narrative EXAM: CT angiography of the CHEST, ABDOMEN, AND PELVIS    INDICATION: ascending aortic aneurysm. Now unresponsive. Any signs of  dissection? Heddy  Altered mental status. COMPARISON: CT 12/22/2019. TECHNIQUE: Technique: Axial images of the chest, abdomen, and pelvis were  obtained  after the administration of intravenous iodinated contrast media. Contrast was used to best identify solid structures. Images also viewed on an  independent workstation including 3D rendering. Radiation dose reduction techniques were used for this study. Our CT scanners  use one or all of the following: Automated exposure control, adjustment of the  mA and/or kV according to patient size, iterative reconstruction. FINDINGS:    CHEST:    Mediastinum and visualized thyroid: No adenopathy. There is a 1.1 cm nodule in  the right thyroid lobe. Moderate sliding-type hiatal hernia. Heart: Normal in size. Lungs: Moderate to advanced centrilobular emphysema in the lungs. No focal  consolidation or pleural effusions. No pneumothorax. No suspicious pulmonary  nodules are identified. ABDOMEN AND PELVIS:    Nonvascular findings in the abdomen/pelvis: The spleen and pancreas are within  normal limits. No focal hepatic lesions. No biliary duct dilation. Gallbladder  appears within normal limits by CT. No hydronephrosis or renal calculi  identified. The adrenal glands are within normal limits. The ureters appear  within normal limits. The bladder appears relatively patulous. No evidence of  bowel obstruction. Negative for ascites. No free intraperitoneal air. No  inflammatory changes associated with the bowel.  No retroperitoneal or peritoneal  adenopathy or mass lesions. Colonic diverticulosis without evidence of  diverticulitis. Musculoskeletal: L1 compression fracture with approximately 70% height loss, new  since the prior CT 12/22/2019. There is approximately 3 mm of bony retropulsion  which appears to result in mild spinal canal stenosis. Prior right hip  arthroplasty. No destructive bony changes are identified    VASCULAR:    Aorta:  Aneurysmal dilation of the ascending aorta which measures approximately  4.0 x 4.0 cm in diameter, similar to the prior exam. Ectasia of the transverse  portion of the aortic arch without feroz aneurysmal change. The descending  thoracic aorta is not aneurysmal. The abdominal aorta is not aneurysmal. No  high-grade stenosis or occlusion. Moderate diffuse calcific atherosclerosis. Celiac artery: Moderate to high-grade stenosis in the proximal celiac artery. There is hypertrophy of the gastroduodenal artery secondary to collaterals. Superior mesenteric artery: No significant stenosis, occlusion, or aneurysm. Inferior mesenteric artery: No significant stenosis, occlusion, or aneurysm. Renal arteries: No significant stenosis, occlusion, or aneurysm. Iliac and Femoral arteries: No significant stenosis, occlusion, or aneurysm. Moderate tortuosity. Pulmonary arteries: Negative for proximal pulmonary embolus. Impression IMPRESSION:    1. Aneurysmal dilation of the ascending aorta to 4 cm. Negative for dissection. 2.  Moderate emphysematous changes in lungs. Lungs are otherwise clear. 3.  L1 compression fracture with approximately 70% height loss, new since the  prior CT 12/22/2019. There is approximately 3 mm of bony retropulsion which  appears to result in mild spinal canal stenosis. MRI could determine acuity as  clinically indicated. 4.  Moderate sliding-type hiatal hernia. Most recent MRI  Results from [place] encounter on 12/01/20   MRI BRAIN WO CONT    Narrative MRI BRAIN WITHOUT CONTRAST.     HISTORY: Slurred speech. Worsening and 8 score. COMPARISON:  MRI 2 hours earlier. Study performed within 24 hours of admission. TECHNIQUE:  Sagittal T1, axial T1, T2, FLAIR, gradient echo, diffusion with ADC  map and coronal FLAIR. FINDINGS:   -Diffusion images: No any areas of restricted diffusion to suggest acute  infarction.    -No midline shift, mass or mass effect. -Gradient echo images: are unremarkable. -FLAIR sequence images: Scattered FLAIR white matter hyperintensities.  -No evidence of acute hemorrhage.    -The lateral ventricles are: normal size.    -The pituitary and parasellar structures: unremarkable on the sagittal T1  images.    -There are normal T2 flow-voids in the major vessels.     -Posterior fossa demonstrates multiple small remote lacunar infarcts.    -The basal ganglia: appear symmetric.    -Orbits: are grossly normal.   -Paranasal sinuses: are clear.  -Other: Old infarct right parietal lobe. .      Impression IMPRESSION: No acute infarction. Chronic small vessel disease. Multiple small  remote infarcts.              Signed By: Andrzej Tafoya MD     December 2, 2020

## 2020-12-02 NOTE — PROGRESS NOTES
Patient drowsy but easy to arouse. Follow commands. Speech is clear.  are equal. Voice no complaints. Head of bed elevated. Respiration even and unlabored. Oxygen saturation 98 % on 2 liters nasal cannula. Instructed patient on use of call light. Verbalize and understanding. Bed in low/lock position. Bed alarm on.

## 2020-12-02 NOTE — PROGRESS NOTES
Problem: Self Care Deficits Care Plan (Adult)  Goal: *Acute Goals and Plan of Care (Insert Text)  Description: Patient will complete lower body dressing with independence to increase self care independence. Patient will complete toileting with independence to increase self care independence. Patient will tolerate 30 minutes of OT treatment with self incorporated rest breaks to increase activity tolerance to enhance participation in hobbies. Patient will complete all functional transfers with independence using adaptive equipment as needed. Patient will complete UE exercises with supervision to increase overall activity tolerance and strength. Patient will improve awareness of left side of enviornment with 2 or less cues from therapist.       Timeframe: 7 visits       OCCUPATIONAL THERAPY: Initial Assessment and Daily Note 12/2/2020  INPATIENT: OT Visit Days: 1  Payor: Pamela New / Plan: [address] [place] [address] HMO / Product Type: Managed Care Medicare /      NAME/AGE/GENDER: Jena Cunningham is a 75 y.o. female   PRIMARY DIAGNOSIS:  Visual changes [H53.9]  Unresponsive [R41.89] Visual changes Visual changes        ICD-10: Treatment Diagnosis:    · Generalized Muscle Weakness (M62.81)  · Other lack of cordination (R27.8)   Precautions/Allergies:     Atorvastatin      ASSESSMENT:     Ms. Sarahy Jacobson presents with semi-new left side vision cut and vertigo problems, she reports having this issues from a CVA last year, resolved, but now has returned. In ICU after code S for seizure/stroke. Left field cut and mild balance deficits, difficult to fully assess in ICU. Below baseline, but should do well at home at D/C. Initiate OT. This section established at most recent assessment   PROBLEM LIST (Impairments causing functional limitations):  1. Decreased Strength  2. Decreased ADL/Functional Activities  3.  Decreased Transfer Abilities   INTERVENTIONS PLANNED: (Benefits and precautions of occupational therapy have been discussed with the patient.)  1. Activities of daily living training  2. Neuromuscular re-eduation  3. Therapeutic activity  4. Therapeutic exercise     TREATMENT PLAN: Frequency/Duration: Follow patient 3x a week to address above goals. Rehabilitation Potential For Stated Goals: Good     REHAB RECOMMENDATIONS (at time of discharge pending progress):    Placement: It is my opinion, based on this patient's performance to date, that Ms. Karan Gramajo may benefit from participating in 1-2 additional therapy sessions in order to continue to assess for rehab potential and then make recommendation for disposition at discharge. Equipment:    None at this time              OCCUPATIONAL PROFILE AND HISTORY:   History of Present Injury/Illness (Reason for Referral):  See H&P  Past Medical History/Comorbidities:   Ms. Karan Gramajo  has a past medical history of Acute ischemic stroke ([facility] [address] (12/22/2019), Aortic atherosclerosis ([facility] [address] Arthritis, Ascending aortic aneurysm (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 Ms. Karan Gramajo  has a past surgical history that includes hx carpal tunnel release (Bilateral, 2004, 2005); hx tonsillectomy; hx cataract removal (07/10/2019); and hx tubal ligation. 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 who has & on 02)  Support Systems: Family member(s)  Patient Expects to be Discharged to[de-identified] Private residence  Current DME Used/Available at Home: None  Tub or Shower Type: Tub/Shower combination  Prior Level of Function/Work/Activity:  Independent.      Number of Personal Factors/Comorbidities that affect the Plan of Care: Expanded review of therapy/medical records (1-2):  MODERATE COMPLEXITY   ASSESSMENT OF OCCUPATIONAL PERFORMANCE[de-identified]   Activities of Daily Living:   Basic ADLs (From Assessment) Complex ADLs (From Assessment)   Feeding: Independent  Oral Facial Hygiene/Grooming: Stand-by assistance  Bathing: Stand-by assistance  Upper Body Dressing: Stand-by assistance  Lower Body Dressing: Stand-by assistance  Toileting: Stand by assistance     Grooming/Bathing/Dressing Activities of Daily Living     Cognitive Retraining  Safety/Judgement: Awareness of environment; Fall prevention                 Functional Transfers  Bathroom Mobility: Stand-by assistance  Toilet Transfer : Stand-by assistance     Bed/Mat Mobility  Supine to Sit: Stand-by assistance  Sit to Supine: Stand-by assistance  Sit to Stand: Stand-by assistance  Stand to Sit: Stand-by assistance  Bed to Chair: Stand-by assistance  Scooting: Stand-by assistance     Most Recent Physical Functioning:   Gross Assessment:                  Posture:     Balance:  Sitting: Intact; Without support  Standing: Impaired; Without support  Standing - Static: Good  Standing - Dynamic : Fair Bed Mobility:  Supine to Sit: Stand-by assistance  Sit to Supine: Stand-by assistance  Scooting: Stand-by assistance  Wheelchair Mobility:     Transfers:  Sit to Stand: Stand-by assistance  Stand to Sit: Stand-by assistance  Bed to Chair: Stand-by assistance            Patient Vitals for the past 6 hrs:   BP SpO2 Pulse   12/02/20 0730 (!) 102/52 91 % (!) 57   12/02/20 0800 106/61 97 % (!) 56   12/02/20 0829 106/61  61   12/02/20 0900 (!) 87/54 98 % (!) 57   12/02/20 1000 (!) 99/53 91 % 62   12/02/20 1100 115/70 94 % 69   12/02/20 1200   61       Mental Status  Neurologic State: Alert  Orientation Level: Oriented X4  Cognition: Appropriate decision making, Appropriate for age attention/concentration  Perception: (absent left peripheral vision)  Perseveration: No perseveration noted  Safety/Judgement: Awareness of environment, Fall prevention            LLE Assessment  LLE Assessment (WDL): Exception to WDL RLE Assessment  RLE Assessment (WDL): Exceptions to [facility]           Physical Skills Involved:  1. Balance  2. Vision Cognitive Skills Affected (resulting in the inability to perform in a timely and safe manner):  1. Perception Psychosocial Skills Affected:  1. WFL   Number of elements that affect the Plan of Care: 1-3:  LOW COMPLEXITY   CLINICAL DECISION MAKIN[address] [place] [address] AM-PAC 6 Clicks   Daily Activity Inpatient Short Form  How much help from another person does the patient currently need. .. Total A Lot A Little None   1. Putting on and taking off regular lower body clothing? [] 1   [] 2   [x] 3   [] 4   2. Bathing (including washing, rinsing, drying)? [] 1   [] 2   [x] 3   [] 4   3. Toileting, which includes using toilet, bedpan or urinal?   [] 1   [] 2   [] 3   [x] 4   4. Putting on and taking off regular upper body clothing? [] 1   [] 2   [] 3   [x] 4   5. Taking care of personal grooming such as brushing teeth? [] 1   [] 2   [] 3   [x] 4   6. Eating meals? [] 1   [] 2   [] 3   [x] 4   © , Trustees of [address] [place] [address], under license to [employer]. All rights reserved      Score:  Initial: 22 Most Recent: X (Date: -- )    Interpretation of Tool:  Represents activities that are increasingly more difficult (i.e. Bed mobility, Transfers, Gait). Medical Necessity:     · Skilled intervention continues to be required due to Deficits noted above. Reason for Services/Other Comments:  · Patient continues to require skilled intervention due to Dx above.    Use of outcome tool(s) and clinical judgement create a POC that gives a: MODERATE COMPLEXITY         TREATMENT:   (In addition to Assessment/Re-Assessment sessions the following treatments were rendered)     Pre-treatment Symptoms/Complaints:    Pain: Initial: Pain Intensity 1: 0  Post Session:  0     Self Care: (10): Procedure(s) (per grid) utilized to improve and/or restore self-care/home management as related to dressing and toileting. Required minimal verbal and tactile cueing to facilitate activities of daily living skills. Initial evaluation 10 minutes    Braces/Orthotics/Lines/Etc:   · O2 Device: Nasal cannula  Treatment/Session Assessment:    · Response to Treatment:  Good, sitting up in recliner  · Interdisciplinary Collaboration:   o Physical Therapist  o Occupational Therapist  o Registered Nurse  · After treatment position/precautions:   o Up in chair  o Call light within reach  o RN notified   · Compliance with Program/Exercises: Compliant all of the time, Will assess as treatment progresses. · Recommendations/Intent for next treatment session: \"Next visit will focus on advancements to more challenging activities and reduction in assistance provided\".   Total Treatment Duration:  OT Patient Time In/Time Out  Time In: 1105  Time Out: Lizabeth 84, OT

## 2020-12-02 NOTE — PROGRESS NOTES
Progress Note    Patient: Curt Paez MRN: [id number]  SSN: [id number]    YOB: 1945  Age: 75 y.o. Sex: female      Admit Date: 12/1/2020    LOS: 1 day     Subjective:   F/U suspected CVA    75 y.o. female who presented to the ED for cc difficulty with vision since 8:00 AM on day of admission. No TPA given. NIH score 3. Hx of paroxysmal a fib on Eliquis, vertigo,  Ascending aortic anerysm followed by Dr. Shannen Galan, COPD, HLD, HTN, and CVA Dec 2019 in right occipital lobe. CT head with no acute changes. CT perfusion study negative. CTA head neck showed 3 mm intracranial left internal carotid artery aneurysm. No proximal vessel occlusion or significant stenosis in the major intracranial arterial vasculature. Negative CTA neck exam for significant stenosis or occlusion in the major cervical arterial vasculature. 11 mm right thyroid lobe nodule. This could be further evaluated with a nonurgent thyroid ultrasound exam. Was admitted for CVA work up. Shortly after admission, NIH score increased to 7 and then became unresponsive. Code S called. Repeat CT perfusion, CT head, MRI, and CT chest with contrast performed to ensure no acute stroke or dissection of aortic aneurysm. Findings showed no acute CVA and no dissection.  so statin started and A1 5.8. Per nursing staff, there was concern of possible seizure when code S called on 12/1/20. EEG pending. Neurology team to officially see today. NIH today is 2. No chest pain or SOB. States her vision is almost back to baseline. Eating well.    Current Facility-Administered Medications   Medication Dose Route Frequency    NUTRITIONAL SUPPORT ELECTROLYTE PRN ORDERS   Does Not Apply PRN    magnesium sulfate 2 g/50 ml IVPB (premix or compounded)  2 g IntraVENous ONCE    potassium chloride (K-DUR, KLOR-CON) SR tablet 40 mEq  40 mEq Oral BID    acetaminophen (TYLENOL) tablet 650 mg  650 mg Oral Q6H PRN    Or    acetaminophen (TYLENOL) suppository 650 mg  650 mg Rectal Q6H PRN    polyethylene glycol (MIRALAX) packet 17 g  17 g Oral DAILY PRN    ondansetron (ZOFRAN) injection 4 mg  4 mg IntraVENous Q6H PRN    cholecalciferol (VITAMIN D3) (1000 Units /25 mcg) tablet 1 Tab  1,000 Units Oral DAILY    apixaban (ELIQUIS) tablet 5 mg  5 mg Oral Q12H    pantoprazole (PROTONIX) tablet 40 mg  40 mg Oral ACB    tuberculin injection 5 Units  5 Units IntraDERMal ONCE    atorvastatin (LIPITOR) tablet 40 mg  40 mg Oral QHS    labetaloL (NORMODYNE;TRANDATE) injection 5 mg  5 mg IntraVENous Q10MIN PRN    LORazepam (ATIVAN) injection 1 mg  1 mg IntraVENous Q2H PRN       Objective:     Vitals:    12/02/20 0700 12/02/20 0730 12/02/20 0800 12/02/20 0829   BP: (!) 96/53 (!) 102/52 106/61 106/61   Pulse: (!) 51 (!) 57 (!) 56 61   Resp: 18 (!) 33 16    Temp: 97.7 °F (36.5 °C)      SpO2: 96% 91% (!) 74%    Weight:             Intake and Output:  Current Shift: No intake/output data recorded. Last three shifts: 11/30 1901 - 12/02 0700  In: 350 [P.O.:150; I.V.:200]  Out: 400 [Urine:400]    Physical Exam:   General:  Alert, cooperative, no distress, appears stated age. Mild left facial droop. Eyes:  Conjunctivae/corneas clear. No nystagmus. Ears:  Normal TMs and external ear canals both ears. Nose: Nares normal. Septum midline. Mouth/Throat: Lips, mucosa, and tongue normal. Teeth and gums normal.   Neck: no JVD. Back:   deferred. Lungs:   Clear to auscultation bilaterally. Heart:  Regular rate and rhythm, S1, S2 normal   Abdomen:   Soft, non-tender. Bowel sounds normal.    Extremities: No drifting. Difficulty placing her left hand in my hand. Otherwise, good strength to all extremities. Pulses: 2+ and symmetric all extremities.    Skin: Skin color, texture, turgor normal. No rashes or lesions   Lymph nodes: Cervical, supraclavicular, and axillary nodes normal.   Neurologic: As above       Lab/Data Review:    Recent Results (from the past 24 hour(s))   GLUCOSE, POC    Collection Time: 12/01/20 11:05 AM   Result Value Ref Range    Glucose (POC) 105 (H) 65 - 100 mg/dL   CBC W/O DIFF    Collection Time: 12/01/20 11:08 AM   Result Value Ref Range    WBC 9.3 4.3 - 11.1 K/uL    RBC 4.25 4.05 - 5.2 M/uL    HGB 14.4 11.7 - 15.4 g/dL    HCT 41.4 35.8 - 46.3 %    MCV 97.4 79.6 - 97.8 FL    MCH 33.9 (H) 26.1 - 32.9 PG    MCHC 34.8 31.4 - 35.0 g/dL    RDW 12.0 11.9 - 14.6 %    PLATELET 249 606 - 297 K/uL    MPV 11.4 9.4 - 12.3 FL    ABSOLUTE NRBC 0.00 0.0 - 0.2 K/uL   HEMOGLOBIN A1C WITH EAG    Collection Time: 12/01/20 11:08 AM   Result Value Ref Range    Hemoglobin A1c 5.8 %    Est. average glucose 120 mg/dL   LIPID PANEL    Collection Time: 12/01/20 11:08 AM   Result Value Ref Range    LIPID PROFILE          Cholesterol, total 198 MG/DL    Triglyceride 114 35 - 150 MG/DL    HDL Cholesterol 68 (H) 40 - 60 MG/DL    LDL, calculated 107.2 (H) <100 MG/DL    VLDL, calculated 22.8 6.0 - 23.0 MG/DL    CHOL/HDL Ratio 2.9 <683     METABOLIC PANEL, BASIC    Collection Time: 12/01/20 11:08 AM   Result Value Ref Range    Sodium 137 136 - 145 mmol/L    Potassium 3.0 (L) 3.5 - 5.1 mmol/L    Chloride 98 98 - 107 mmol/L    CO2 31 21 - 32 mmol/L    Anion gap 8 7 - 16 mmol/L    Glucose 89 65 - 100 mg/dL    BUN 12 8 - 23 MG/DL    Creatinine 0.69 0.6 - 1.0 MG/DL    GFR est AA >60 >60 ml/min/1.73m2    GFR est non-AA >60 >60 ml/min/1.73m2    Calcium 9.2 8.3 - 10.4 MG/DL   TSH 3RD GENERATION    Collection Time: 12/01/20 11:08 AM   Result Value Ref Range    TSH 3.460 uIU/mL   T4, FREE    Collection Time: 12/01/20 11:08 AM   Result Value Ref Range    T4, Free 1.0 0.78 - 1.46 NG/DL   MAGNESIUM    Collection Time: 12/01/20 11:08 AM   Result Value Ref Range    Magnesium 1.7 (L) 1.8 - 2.4 mg/dL   DRUG SCREEN, URINE    Collection Time: 12/01/20  5:16 PM   Result Value Ref Range    PCP(PHENCYCLIDINE) Negative      BENZODIAZEPINES Negative      COCAINE Negative      AMPHETAMINES Negative METHADONE Negative      THC (TH-CANNABINOL) Negative      OPIATES Negative      BARBITURATES Negative     GLUCOSE, POC    Collection Time: 12/01/20  5:31 PM   Result Value Ref Range    Glucose (POC) 131 (H) 65 - 096 mg/dL   METABOLIC PANEL, BASIC    Collection Time: 12/02/20  4:22 AM   Result Value Ref Range    Sodium 139 136 - 145 mmol/L    Potassium 3.0 (L) 3.5 - 5.1 mmol/L    Chloride 104 98 - 107 mmol/L    CO2 30 21 - 32 mmol/L    Anion gap 5 (L) 7 - 16 mmol/L    Glucose 90 65 - 100 mg/dL    BUN 8 8 - 23 MG/DL    Creatinine 0.61 0.6 - 1.0 MG/DL    GFR est AA >60 >60 ml/min/1.73m2    GFR est non-AA >60 >60 ml/min/1.73m2    Calcium 8.7 8.3 - 10.4 MG/DL       Assessment/ Plan:     Principal Problem:    Visual changes (12/1/2020)    Active Problems:    Hypertension ()      GERD (gastroesophageal reflux disease) ()      Hyperlipemia ()      Stage 1 mild COPD by GOLD classification (HCC) ()      Thoracic ascending aortic aneurysm (HCC) ()      Paroxysmal atrial fibrillation ([facility] [address] (5/18/2020)      Thyroid nodule (12/1/2020)      Aneurysm, carotid artery, internal (12/1/2020)      Unresponsive (12/1/2020)    Visual changes - Suspect due to CVA but no obvious abnormality on imaging. Neurology to officially see today. Eliquis and statin. ECHO pending. Possible seizure? - EEG pending.      Internal carotid artery aneurysm - Monitor. 3mm so risk of rupture low. Keep BP controlled.      Thyroid nodule - Will need outpatient thyroid US    Aortic aneurysm - Stable     Paroxysmal a fib - Eliquis    Hypokalemia - Supplement.      Holding BP medications since BP normal/low     She wishes to be DNR    Possible dc tomorrow depending on neuro recs, and how does with PT/OT  Signed By: Laurel Clements DO     December 2, 2020

## 2020-12-02 NOTE — PROGRESS NOTES
SPEECH LANGUAGE PATHOLOGY: DYSPHAGIA- Initial Assessment and Discharge    NAME/AGE/GENDER: Donavan Pappas is a 75 y.o. female  DATE: 12/2/2020  PRIMARY DIAGNOSIS: Visual changes [H53.9]  Unresponsive [R41.89]      ICD-10: Treatment Diagnosis: R13.12 Dysphagia, Oropharyngeal Phase    RECOMMENDATIONS   DIET:    continue prescribed diet   PO diet texture:  Regular   Liquids:  regular thin    MEDICATIONS: With liquid     ASPIRATION PRECAUTIONS  · Slow rate of intake  · Small bites/sips  · Upright at 90 degrees during meal     COMPENSATORY STRATEGIES/MODIFICATIONS  · None     EDUCATION:  · Recommendations discussed with Nursing  · Family  · Patient     CONTINUATION OF SKILLED SERVICES/MEDICAL NECESSITY:  No dysphagia goals identified as swallow function is within normal limits. RECOMMENDATIONS for CONTINUED SPEECH THERAPY:   No further speech therapy indicated at this time. ASSESSMENT   Patient presents with normal oropharyngeal swallow function. No overt s/sx airway compromise with any trialed consistencies. Recommend continue regular consistency diet/thin liquids. Medications with liquid rinse. No dysphagia goals established at this time. REHABILITATION POTENTIAL FOR STATED GOALS: Excellent    PLAN    FREQUENCY/DURATION: No further speech therapy indicated at this time as oropharyngeal swallow function is within normal limits. - Recommendations for next treatment session: No additional speech therapy indicated at this time. SUBJECTIVE   Patient alert upright in bedside chair consuming lunch meal upon arrival. Friendly and pleasant.     History of Present Injury/Illness: Ms. 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 . She also  has a past surgical history that includes hx carpal tunnel release (Bilateral, 2004, 2005); hx tonsillectomy; hx cataract removal (07/10/2019); and hx tubal ligation. Problem List:  (Impairments causing functional limitations):  1. Oropharyngeal dysphagia- No symptoms identified    Previous Dysphagia: NONE REPORTED  Diet Prior to Evaluation: Regular/thin    Orientation:   Person  Place  Time  Situation    Pain: Pain Scale 1: Numeric (0 - 10)  Pain Intensity 1: 0    Cognitive-Linguistic Screening:   Speech Production:   o WNL   Expressive Language:  o WNL   Receptive Language:  o WNL   Cognition:   o WNL- no deficits noted. Stroke work up negative  Prior Level of Function: Live with sister. Independent with all ADLs. Works for [employer] Recommendations: Given results of screening, cognitive linguistic function appears to be within normal limits. No further assessment indicated at this time. OBJECTIVE   Oral Motor:   · Labial: WNL  · Lingual: WNL  · Oral hygiene: adequate  · Dentition: natural/intact    Swallow evaluation:   Patient consumed trials of thin by cup/straw/serial sips and solid consistecy without overt s/sx airway compromise. Prompt oral prep time and adequate oral clearance with all consistencies.      INTERDISCIPLINARY COLLABORATION: Registered Nurse  PRECAUTIONS/ALLERGIES: Atorvastatin     Tool Used: Dysphagia Outcome and Severity Scale (EMETERIO)    Score Comments   Normal Diet  [x] 7 With no strategies or extra time needed   Functional Swallow  [] 6 May have mild oral or pharyngeal delay   Mild Dysphagia  [] 5 Which may require one diet consistency restricted    Mild-Moderate Dysphagia  [] 4 With 1-2 diet consistencies restricted   Moderate Dysphagia  [] 3 With 2 or more diet consistencies restricted   Moderate-Severe Dysphagia  [] 2 With partial PO strategies (trials with ST only)   Severe Dysphagia  [] 1 With inability to tolerate any PO safely      Score:  Initial: 7 Most Recent: 7 (Date 12/02/20 )   Interpretation of Tool: The Dysphagia Outcome and Severity Scale (EMETERIO) is a simple, easy-to-use, 7-point scale developed to systematically rate the functional severity of dysphagia based on objective assessment and make recommendations for diet level, independence level, and type of nutrition. Current Medications:   No current facility-administered medications on file prior to encounter. Current Outpatient Medications on File Prior to Encounter   Medication Sig Dispense Refill    metoprolol succinate (TOPROL-XL) 25 mg XL tablet Take  by mouth daily.  losartan-hydroCHLOROthiazide (HYZAAR) 100-25 mg per tablet Take 1 Tab by mouth daily. 90 Tab 3    esomeprazole (NEXIUM) 40 mg capsule Take 1 Cap by mouth daily as needed (GERD). (Patient taking differently: Take 40 mg by mouth daily.) 90 Cap 3    Eliquis 5 mg tablet Take 1 Tab by mouth two (2) times a day. 180 Tab 3    multivitamin (ONE A DAY) tablet Take 1 Tab by mouth daily.  ascorbic acid, vitamin C, (Vitamin C) 1,000 mg tablet Take  by mouth.  cholecalciferol, vitamin D3, (VITAMIN D3 PO) Take  by mouth.  POTASSIUM-99 PO Take  by mouth.  CALCIUM-MAGNESIUM PO Take  by mouth.          After treatment position/precautions:  · Up in chair  · RN notified  · Family at bedside    Total Treatment Duration:   Time In: 4408  Time Out: [address], [address] [facility], [place]

## 2020-12-02 NOTE — PROGRESS NOTES
Late entry due to hands on patient care. Stroke education booklet given to patient. Time allowed for question. Will follow up with patient.

## 2020-12-02 NOTE — PROGRESS NOTES
No change in assessment. Patient resting quietly. Head of bed elevated. Call light within reach. Bed in low/lock position. Bed alarm on.

## 2020-12-02 NOTE — CONSULTS
IRC/Physiatry Consult Acknowled;    EMR reviewed. Hx; \"64 y. o. female who presented to the ED for cc difficulty with vision since 8:00 AM on day of admission. No TPA given. NIH score 3. Hx of paroxysmal a fib on Eliquis, vertigo,  Ascending aortic anerysm followed by Dr. Chang Ramp, COPD, HLD, HTN, and CVA Dec 2019 in right occipital lobe. CT head with no acute changes. CT perfusion study negative. CTA head neck showed 3 mm intracranial left internal carotid artery aneurysm. No proximal vessel occlusion or significant stenosis in the major intracranial arterial vasculature. Negative CTA neck exam for significant stenosis or occlusion in the major cervical arterial vasculature. 11 mm right thyroid lobe nodule. \"    Shortly after presentation, her NIHSS increased to 7. A CODE S was called and pt had short period of unconsciousness. There was possible concern for possible sz. EEG ordered. NIHSS improved to 2 and pt back to baseline. NEURO consult pending. Stroke w/u, including MRI is NEG. PT/OT pending. 2D ECHO pending. REC; given negative stroke w/u, pt does not meet the medical necessity needed for [facility] admission. Depending on evals, may benefit from [facility] for a short period of time; however, complaint remains to be a visual disturbance. - no charge  Maude Bryan MD, Medical Director  [address] [place] [address]

## 2020-12-03 VITALS
HEART RATE: 64 BPM | OXYGEN SATURATION: 93 % | TEMPERATURE: 98.3 F | DIASTOLIC BLOOD PRESSURE: 76 MMHG | SYSTOLIC BLOOD PRESSURE: 138 MMHG | WEIGHT: 144.18 LBS | RESPIRATION RATE: 24 BRPM | BODY MASS INDEX: 28.16 KG/M2

## 2020-12-03 LAB
ANION GAP SERPL CALC-SCNC: 5 MMOL/L (ref 7–16)
BUN SERPL-MCNC: 11 MG/DL (ref 8–23)
CALCIUM SERPL-MCNC: 8.1 MG/DL (ref 8.3–10.4)
CHLORIDE SERPL-SCNC: 104 MMOL/L (ref 98–107)
CO2 SERPL-SCNC: 29 MMOL/L (ref 21–32)
CREAT SERPL-MCNC: 0.57 MG/DL (ref 0.6–1)
GLUCOSE SERPL-MCNC: 88 MG/DL (ref 65–100)
MAGNESIUM SERPL-MCNC: 1.9 MG/DL (ref 1.8–2.4)
POTASSIUM SERPL-SCNC: 3.6 MMOL/L (ref 3.5–5.1)
SODIUM SERPL-SCNC: 138 MMOL/L (ref 136–145)

## 2020-12-03 PROCEDURE — 95819 EEG AWAKE AND ASLEEP: CPT | Performed by: PSYCHIATRY & NEUROLOGY

## 2020-12-03 PROCEDURE — 97116 GAIT TRAINING THERAPY: CPT

## 2020-12-03 PROCEDURE — 83735 ASSAY OF MAGNESIUM: CPT

## 2020-12-03 PROCEDURE — 74011250637 HC RX REV CODE- 250/637: Performed by: FAMILY MEDICINE

## 2020-12-03 PROCEDURE — 80048 BASIC METABOLIC PNL TOTAL CA: CPT

## 2020-12-03 PROCEDURE — 36415 COLL VENOUS BLD VENIPUNCTURE: CPT

## 2020-12-03 PROCEDURE — 97110 THERAPEUTIC EXERCISES: CPT

## 2020-12-03 RX ADMIN — APIXABAN 5 MG: 5 TABLET, FILM COATED ORAL at 09:58

## 2020-12-03 RX ADMIN — PANTOPRAZOLE SODIUM 40 MG: 40 TABLET, DELAYED RELEASE ORAL at 07:36

## 2020-12-03 RX ADMIN — POTASSIUM CHLORIDE 40 MEQ: 1500 TABLET, EXTENDED RELEASE ORAL at 09:58

## 2020-12-03 RX ADMIN — VITAMIN D, TAB 1000IU (100/BT) 1 TABLET: 25 TAB at 09:58

## 2020-12-03 NOTE — PROGRESS NOTES
I have reviewed discharge instructions with the patient. The patient verbalized understanding. Patient left ICU via Discharge Method: wheelchair to Home with [employer]. Opportunity for questions and clarification provided. Patient given 0 scripts. To continue your aftercare when you leave the hospital, you may receive an automated call from our care team to check in on how you are doing. This is a free service and part of our promise to provide the best care and service to meet your aftercare needs.  If you have questions, or wish to unsubscribe from this service please call [phone]. Thank you for Choosing our [address] [place] [address] Emergency Department.

## 2020-12-03 NOTE — PROCEDURES
[facility]   Routine Electroencephalogram Report      DATE:  12/02/2020  Time Start: 720 Time End: 1008    EEG Number:      Indication:  Episode: syncope versus seizure    Medications:   Current Facility-Administered Medications   Medication Dose Route Frequency Provider Last Rate Last Dose    NUTRITIONAL SUPPORT ELECTROLYTE PRN ORDERS   Does Not Apply PRN Suri Calabrese MD        potassium chloride (K-DUR, KLOR-CON) SR tablet 40 mEq  40 mEq Oral BID Carissa Medicine, DO   40 mEq at 12/03/20 3830    acetaminophen (TYLENOL) tablet 650 mg  650 mg Oral Q6H PRN Carissa Medicine, DO        Or    acetaminophen (TYLENOL) suppository 650 mg  650 mg Rectal Q6H PRN Carissa Medicine, DO        polyethylene glycol (MIRALAX) packet 17 g  17 g Oral DAILY PRN Carissa Medicine, DO        ondansetron [facility]) injection 4 mg  4 mg IntraVENous Q6H PRN Carissa Medicine, DO        cholecalciferol (VITAMIN D3) (1000 Units /25 mcg) tablet 1 Tab  1,000 Units Oral DAILY Carissa Medicine, DO   1 Tab at 12/03/20 3028    apixaban (ELIQUIS) tablet 5 mg  5 mg Oral Q12H Carissa Medicine, DO   5 mg at 12/03/20 0958    pantoprazole (PROTONIX) tablet 40 mg  40 mg Oral ACB Carissa Medicine, DO   40 mg at 12/03/20 0736    labetaloL (NORMODYNE;TRANDATE) injection 5 mg  5 mg IntraVENous Q10MIN PRN Carissa Medicine, DO        LORazepam (ATIVAN) injection 1 mg  1 mg IntraVENous Q2H PRN Carissa Medicine, DO           Technique: The EEG was recorded on a 32 channel Dock[employer] digital EEG machine. A full electrode headset was applied in accordance with the International 10-20 System of Electrode Placement. All impedances are less than 5000 Ohms. Time locked digital video of the patient was recorded and reviewed as needed for clinical correlation     State of Consciousness: awake, drowsy and asleep       Description:   The waking EEG is well organized and demonstrates continuous rhythmic 8-8.5 Hz, 30-50 uV activity posteriorly which reacts to eye opening and closure. During drowsiness and light sleep mixed frequency 40-60 uV 5-7 Hz slsowing is seem symmetrically. No focal slowing or epileptiform activity is present. Activation Procedures:  Hyperventilation: not performed   Photic Stimulation: did not alter the record        Interpretation: Normal electroencephalogram, awake, asleep and with activation procedures. There are no focal lateralizing or epileptiform features.

## 2020-12-03 NOTE — PROGRESS NOTES
Problem: Mobility Impaired (Adult and Pediatric)  Goal: *Acute Goals and Plan of Care (Insert Text)  Description: DISCHARGE GOALS :  (1.)Ms. Emilee Lowry will move from supine to sit and sit to supine  with INDEPENDENT. (2.)Ms. Emilee Lowry will transfer from bed to chair and chair to bed with SUPERVISION using the least restrictive device. MET 12/3/20  (3.)Ms. Emilee Lowry will ambulate with SUPERVISION for 200 feet with the least restrictive device. MET 12/3/20  4) pt able to go up & down 2 steps with right rail & supervision. Outcome: Progressing Towards Goal     PHYSICAL THERAPY: Daily Note and AM 12/3/2020  INPATIENT: PT Visit Days : 2  Payor: Lorrie Fonseca / Plan: [address] [place] [address] HMO / Product Type: Managed Care Medicare /       NAME/AGE/GENDER: Quincy Saini is a 75 y.o. female   PRIMARY DIAGNOSIS: Visual changes [H53.9]  Unresponsive [R41.89] Visual changes Visual changes       ICD-10: Treatment Diagnosis:    · Generalized Muscle Weakness (M62.81)  · Other lack of cordination (R27.8)  · Difficulty in walking, Not elsewhere classified (R26.2)   Precaution/Allergies:  Atorvastatin      ASSESSMENT:     Ms. Emilee Lowry presents with loss of left peripheral vision along with mildly unsteady gait transfers. This pt will benefit from follow up therapy to help her return to her prior level of function. This pt does not appear to have a good DC plan if assist is needed in the home setting. She did better this am with mobility. Significantly increased her amb distance and climbed steps. She will be DCd later today. I recommended  PT but she declined. She has an elderly sister who lives with her. This section established at most recent assessment   PROBLEM LIST (Impairments causing functional limitations):  1. Decreased Strength  2. Decreased ADL/Functional Activities  3. Decreased Transfer Abilities  4. Decreased Ambulation Ability/Technique  5. Decreased Balance  6. Decreased Activity Tolerance  7.  Decreased [place] with Home Exercise Program   INTERVENTIONS PLANNED: (Benefits and precautions of physical therapy have been discussed with the patient.)  1. Balance Exercise  2. Bed Mobility  3. Gait Training  4. Neuromuscular Re-education/Strengthening  5. Therapeutic Activites  6. Therapeutic Exercise/Strengthening  7. Transfer Training     TREATMENT PLAN: Frequency/Duration: daily for duration of hospital stay  Rehabilitation Potential For Stated Goals: Good     REHAB RECOMMENDATIONS (at time of discharge pending progress):    Placement: It is my opinion, based on this patient's performance to date, that Ms. Dennis Bashir may benefit from [address] after discharge due to the functional deficits listed above that are likely to improve with skilled rehabilitation because he/she has multiple medical issues that affect his/her functional mobility in the community. She politely declined this  Equipment:    None at this time              HISTORY:   History of Present Injury/Illness (Reason for Referral):  Patient is a 75 y.o. female who presented to the ED for cc difficulty with vision since this AM at 8:00. No TPA given. NIH score 3. Hx of paroxysmal a fib on Eliquis, vertigo,  Ascending aortic anerysm followed by Dr. Ivana West, COPD, HLD, HTN, and CVA Dec 2019 in right occipital lobe. CT head with no acute changes. CT perfusion study negative. CTA head neck showed 3 mm intracranial left internal carotid artery aneurysm. No proximal vessel occlusion or significant stenosis in the major intracranial arterial vasculature. Negative CTA neck exam for significant stenosis or occlusion in the major cervical arterial vasculature. 11 mm right thyroid lobe nodule.  This could be further evaluated with a nonurgent thyroid ultrasound exam.  Past Medical History/Comorbidities:   Ms. 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 Ms. Damaris Ackerman  has a past surgical history that includes hx carpal tunnel release (Bilateral, 2004, 2005); hx tonsillectomy; hx cataract removal (07/10/2019); and hx tubal ligation. 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 who has & on 02)  Support Systems: Family member(s)  Patient Expects to be Discharged to[de-identified] Private residence  Current DME Used/Available at Home: None  Tub or Shower Type: Tub/Shower combination  Prior Level of Function/Work/Activity:  Pt was independent without an assistive device prior to this admission  Personal Factors:           Other factors that influence how disability is experienced by the patient:  current & PMH   Number of Personal Factors/Comorbidities that affect the Plan of Care: 3+: HIGH COMPLEXITY   EXAMINATION:   Most Recent Physical Functioning:   Gross Assessment:                     Balance:  Sitting: Intact  Standing: (high guard) Bed Mobility:  Supine to Sit: Supervision  Sit to Supine: Supervision  Scooting: Supervision       Transfers:  Sit to Stand: Supervision  Stand to Sit: Supervision  Stand Pivot Transfers: Supervision  Gait:     Speed/Prabha: Pace decreased (<100 feet/min)  Step Length: Left shortened  Stance: Right decreased  Gait Abnormalities: Decreased step clearance  Distance (ft): 650 Feet (ft)  Ambulation - Level of Assistance: Supervision  Stairs - Level of Assistance: (up/down 5 steps with rails and CGA)  Interventions: Safety awareness training;Verbal cues  Duration: 15 Minutes Functional Mobility:           Body Structures Involved:  1. Nerves  2. Muscles Body Functions Affected:  1. Neuromusculoskeletal  2. Movement Related Activities and Participation Affected:  1. General Tasks and Demands  2. Mobility   Number of elements that affect the Plan of Care: 4+: HIGH COMPLEXITY   CLINICAL PRESENTATION:   Presentation: Evolving clinical presentation with changing clinical characteristics: MODERATE COMPLEXITY   CLINICAL DECISION MAKIN [facility] Mobility Inpatient Short Form  How much difficulty does the patient currently have. .. Unable A Lot A Little None   1. Turning over in bed (including adjusting bedclothes, sheets and blankets)? [] 1   [] 2   [] 3   [x] 4   2. Sitting down on and standing up from a chair with arms ( e.g., wheelchair, bedside commode, etc.)   [] 1   [] 2   [] 3   [x] 4   3. Moving from lying on back to sitting on the side of the bed? [] 1   [] 2   [] 3   [x] 4   How much help from another person does the patient currently need. .. Total A Lot A Little None   4. Moving to and from a bed to a chair (including a wheelchair)? [] 1   [] 2   [] 3   [x] 4   5. Need to walk in hospital room? [] 1   [] 2   [x] 3   [] 4   6. Climbing 3-5 steps with a railing? [] 1   [] 2   [x] 3   [] 4   © , Trustees of [address] [place] [address], under license to [employer]. All rights reserved      Score:  Initial: 16 Most Recent: 22 (Date:  )    Interpretation of Tool:  Represents activities that are increasingly more difficult (i.e. Bed mobility, Transfers, Gait). Medical Necessity:     · Patient is expected to demonstrate progress in   · strength, balance, coordination, and functional technique  ·  to   · decrease assistance required with bed mobility, transfers & gait  · .  Reason for Services/Other Comments:  · Patient continues to require skilled intervention due to   · Pt unsteady with functional mobility  · .    Use of outcome tool(s) and clinical judgement create a POC that gives a: Questionable prediction of patient's progress: MODERATE COMPLEXITY            TREATMENT:   (In addition to Assessment/Re-Assessment sessions the following treatments were rendered)   Pre-treatment Symptoms/Complaints:  \" I feel like I am fine, just a little stiff in my hips\"  Pain: Initial: numeric scale  Pain Intensity 1: 1  Pain Location 1: Hip  Pain Intervention(s) 1: Ambulation/Increased Activity, Elevation, Exercise, Repositioned  Post Session: 0/10     Gait Training (15 Minutes):  Gait training to improve and/or restore physical functioning as related to mobility, strength and balance. Ambulated 650 Feet (ft) with Supervision and climbed up/down 5 steps with rail and CGA and minimal Safety awareness training;Verbal cues related to their stance phase, stride length and hip position and motion to promote proper body posture. Therapeutic Exercise: (10 Minutes):  Exercises per grid below to improve mobility, strength and coordination. Required minimal visual, verbal and tactile cues to promote proper body alignment. Progressed resistance, range, repetitions and complexity of movement as indicated. Date:  12/3/20 Date:   Date:     Activity/Exercise Parameters Parameters Parameters   SUPINE: ankle pumps 15          Hip AB/AD 15          Heel slides 15          SLR 15     SITTING: marching 15          LAQ 15                 Braces/Orthotics/Lines/Etc:   · ICU monitors  Treatment/Session Assessment:    · Response to Treatment:  tolerated well, very cooperative  · Interdisciplinary Collaboration:   o Physical Therapist  o Registered Nurse  o Physician  o   · After treatment position/precautions:   o Up in chair  o Bed/Chair-wheels locked  o Call light within reach  o RN notified   · Compliance with Program/Exercises: Compliant most of the time  Recommendations/Intent for next treatment session: Probably [address] home today.  I recommended [facility] PT, but she refused  Total Treatment Duration:  PT Patient Time In/Time Out  Time In: 0845  Time Out: [address], PT

## 2020-12-03 NOTE — PROGRESS NOTES
Care Management Interventions  PCP Verified by CM: Yes(Dr. Dinah Cyr)  Mode of Transport at Discharge: (family)  Current Support Network: Own Home(Sister (Shanthi Anderson) lives with pt)  Confirm Follow Up Transport: Family  The Patient and/or Patient Representative was Provided with a Choice of Provider and Agrees with the Discharge Plan?: Yes  Freedom of Choice List was Provided with Basic Dialogue that Supports the Patient's Individualized Plan of Care/Goals, Treatment Preferences and Shares the Quality Data Associated with the Providers?: Yes  Discharge Location  Discharge Placement: Home  Pt ready to d/c home with family, PT has recommended [facility] services but pt has graciously refused.  No further needs at this time, CM signing off

## 2020-12-03 NOTE — PROGRESS NOTES
Problem: Falls - Risk of  Goal: *Absence of Falls  Description: Document Milagros Criselda Fall Risk and appropriate interventions in the flowsheet. Outcome: Progressing Towards Goal  Note: Fall Risk Interventions:  Mobility Interventions: Assess mobility with egress test, Bed/chair exit alarm, Communicate number of staff needed for ambulation/transfer, Patient to call before getting OOB         Medication Interventions: Assess postural VS orthostatic hypotension, Bed/chair exit alarm, Evaluate medications/consider consulting pharmacy, Patient to call before getting OOB, Teach patient to arise slowly, Utilize gait belt for transfers/ambulation    Elimination Interventions: Bed/chair exit alarm, Call light in reach, Patient to call for help with toileting needs, Stay With Me (per policy), Toileting schedule/hourly rounds              Problem: Patient Education: Go to Patient Education Activity  Goal: Patient/Family Education  Outcome: Progressing Towards Goal     Problem: Pressure Injury - Risk of  Goal: *Prevention of pressure injury  Description: Document Derrek Scale and appropriate interventions in the flowsheet. Outcome: Progressing Towards Goal  Note: Pressure Injury Interventions: Activity Interventions: Pressure redistribution bed/mattress(bed type), Increase time out of bed, PT/OT evaluation    Mobility Interventions: HOB 30 degrees or less, Pressure redistribution bed/mattress (bed type), PT/OT evaluation, Turn and reposition approx.  every two hours(pillow and wedges)    Nutrition Interventions: Document food/fluid/supplement intake, Offer support with meals,snacks and hydration    Friction and Shear Interventions: Apply protective barrier, creams and emollients, Foam dressings/transparent film/skin sealants, HOB 30 degrees or less, Lift sheet, Transferring/repositioning devices                Problem: Patient Education: Go to Patient Education Activity  Goal: Patient/Family Education  Outcome: Progressing Towards Goal     Problem: Patient Education: Go to Patient Education Activity  Goal: Patient/Family Education  Outcome: Progressing Towards Goal

## 2020-12-03 NOTE — DISCHARGE INSTRUCTIONS
Patient Education        Learning About Antiplatelet Medicines After a Stroke  Introduction     If you have had a stroke, you may have concerns about having another one. You want to do all you can do to avoid this. If your stroke was caused by a blood clot, one of the best things you can do is to take antiplatelet medicines. They can help prevent another stroke. In most cases, you don't take them if you had a stroke caused by a leak in an artery. These medicines are often called blood thinners. But they don't thin your blood. They work to keep platelets from sticking together and forming blood clots. (A platelet is a type of blood cell.) Blood clots can cause a stroke if they block a blood vessel in the brain. So by preventing blood clots, you are helping to prevent a stroke. Examples  · Aspirin (Nicho, Bufferin, Ecotrin)  · Aspirin with dipyridamole (Aggrenox)  · Clopidogrel (Plavix)  Possible side effects  These medicines make your blood take longer than normal to clot. This can cause bleeding, and you may bruise easily. In rare cases, they can cause you to bleed inside your body without an injury. If you have an injury, you might have bleeding that is hard to control. These medicines may have other side effects. Depending on which one you take, you may:  · Have diarrhea. · Feel sick to your stomach. · Have a headache. · Have some mild belly pain. You may have other side effects or reactions not listed here. Check the information that comes with your medicine. What to know about taking this medicine  · Be sure you get instructions about how to take your medicine safely. Blood thinners can cause serious bleeding problems. · Be safe with medicines. Take your medicines exactly as prescribed. Call your doctor if you think you are having a problem with your medicine. · Check with your doctor or pharmacist before you use any other medicines, including over-the-counter medicines.  Make sure your doctor knows all of the medicines, vitamins, herbal products, and supplements you take. Taking some medicines together can cause problems. Where can you learn more? Go to http://www.gray.com/  Enter T164 in the search box to learn more about \"Learning About Antiplatelet Medicines After a Stroke. \"  Current as of: December 16, 2019               Content Version: 12.6  © 0519-5117 [employer]. Care instructions adapted under license by [employer] (which disclaims liability or warranty for this information). If you have questions about a medical condition or this instruction, always ask your healthcare professional. [place] [address] any warranty or liability for your use of this information. Patient Education        Thoracic Aortic Aneurysm: Care Instructions  Your Care Instructions  A thoracic aortic aneurysm is a bulge in a blood vessel (aorta) in the chest. The bulge occurs in a weak spot in the vessel. A large aneurysm can be very dangerous. If it bursts, it can cause bleeding that leads to death. A thoracic aortic aneurysm can be caused by an injury to the chest, hardening of the arteries, or an infection. Sometimes aneurysms run in families. Small aneurysms may not need treatment. But you will need regular checkups to see how fast the aneurysm is growing. A large aneurysm may need surgery to repair the weak area of the aorta. Follow-up care is a key part of your treatment and safety. Be sure to make and go to all appointments, and call your doctor if you are having problems. It's also a good idea to know your test results and keep a list of the medicines you take. How can you care for yourself at home? · Control high blood pressure. High blood pressure increases the chance of an aneurysm bursting. A healthy lifestyle along with medicines may help you lower your blood pressure. · Manage cholesterol to help keep your blood vessels healthy.  A healthy lifestyle along with medicines may help you manage cholesterol. · Do not smoke. Smoking can make the aneurysm grow faster. If you need help quitting, talk to your doctor about stop-smoking programs and medicines. These can increase your chances of quitting for good. · Stay at a healthy weight. Being overweight may not make the aneurysm any worse. But being at a healthy weight can reduce your risks from surgery if you need it. · Eat heart-healthy foods. These include fruits, vegetables, whole grains, fish, and low-fat or nonfat dairy foods. Limit sodium, alcohol, and sweets. · If your doctor recommends it, get more exercise. Walking is a good choice. Bit by bit, increase the amount you walk every day. Try for at least 30 minutes on most days of the week. You also may want to swim, bike, or do other activities. When should you call for help? Call 911 anytime you think you may need emergency care. For example, call if:    · You have sudden chest pain and shortness of breath, or you cough up blood.     · You passed out (lost consciousness). Call your doctor now or seek immediate medical care if:    · You have any new chest pain. Watch closely for changes in your health, and be sure to contact your doctor if:    · You have any problems making your doctor visits.     · You do not get better as expected. Where can you learn more? Go to http://www.gray.com/  Enter C580 in the search box to learn more about \"Thoracic Aortic Aneurysm: Care Instructions. \"  Current as of: March 4, 2020               Content Version: 12.6  © 2006-2020 [employer]. Care instructions adapted under license by [employer] (which disclaims liability or warranty for this information).  If you have questions about a medical condition or this instruction, always ask your healthcare professional. [place] [address] any warranty or liability for your use of this information. Stroke: After Your Visit     Your Care Instructions     You have had a stroke. Risk factors for stroke include being overweight, smoking, and sedentary lifestyle. This means that the blood flow to a part of your brain was blocked for some time, which damages the nerve cells in that part of the brain. The part of your body controlled by that part of your brain may not function properly now. The brain is an amazing organ that can heal itself to some degree. The stroke you had damaged part of your brain, but other parts of your brain may take over in some way for the damaged areas. You have already started this process. Going home may be hard for you and your family. The more you can try to do for yourself, the better. Remember to take each day one at a time. Follow-up care is a key part of your treatment and safety. Be sure to make and go to all appointments, and call your doctor if you are having problems. Its also a good idea to know your test results and keep a list of the medicines you take. How can you care for yourself at home? Enter a stroke rehabilitation (rehab) program, if your doctor recommends it. Physical, speech, and occupational therapies can help you manage bathing, dressing, eating, and other basics of daily living. Eat a heart-healthy diet that is low in cholesterol, saturated fat, and salt. Eat lots of fresh fruits and vegetables and foods high in fiber. Increase your activities slowly. Take short rest breaks when you get tired. Gradually increase the amount you walk. Start out by walking a little more than you did the day before. Do not drive until your doctor says it is okay. It is normal to feel sad or depressed after a stroke. If the blues last, talk to your doctor. If you are having problems with urine leakage, go to the bathroom at regular times, including when you first wake up and at bedtime. Also, limit fluids after dinner.   If you are constipated, drink plenty of fluids, enough so that your urine is light yellow or clear like water. If you have kidney, heart, or liver disease and have to limit fluids, talk with your doctor before you increase the amount of fluids you drink. Set up a regular time for using the toilet. If you continue to have constipation, your doctor may suggest using a bulking agent, such as Metamucil, or a stool softener, laxative, or enema. Medicines  Take your medicines exactly as prescribed. Call your doctor if you think you are having a problem with your medicine. You may be taking several medicines. ACE (angiotensin-converting enzyme) inhibitors, angiotensin II receptor blockers (ARBs), beta-blockers, diuretics (water pills), and calcium channel blockers control your blood pressure. Statins help lower cholesterol. Your doctor may also prescribe medicines for depression, pain, sleep problems, anxiety, or agitation. If your doctor has given you medicine that prevents blood clots, such as warfarin (Coumadin), aspirin combined with extended-release dipyridamole (Aggrenox), clopidogrel (Plavix), or aspirin to prevent another stroke, you should:  Tell your dentist, pharmacist, and other health professionals that you take these medicines. Watch for unusual bruising or bleeding, such as blood in your urine, red or black stools, or bleeding from your nose or gums. Get regular blood tests to check your clotting time if you are taking Coumadin. Wear medical alert jewelry that says you take blood thinners. You can buy this at most drugstores. Do not take any over-the-counter medicines or herbal products without talking to your doctor first.  If you take birth control pills or hormone replacement therapy, talk to your doctor about whether they are right for you. For family members and caregivers  Make the home safe. Set up a room so that your loved one does not have to climb stairs. Be sure the bathroom is on the same floor. Move throw rugs and furniture that could cause falls, and make sure that the lighting is good. Put grab bars and seats in tubs and showers. Find out what your loved one can do and what he or she needs help with. Try not to do things for your loved one that your loved one can do on his or her own. Help him or her learn and practice new skills. Visit and talk with your loved one often. Try doing activities together that you both enjoy, such as playing cards or board games. Keep in touch with your loved one's friends as much as you can, and encourage them to visit. Take care of yourself. Do not try to do everything yourself. Ask other family members to help. Eat well, get enough rest, and take time to do things that you enjoy. Keep up with your own doctor visits, and make sure to take your medicines regularly. Get out of the house as much as you can. Join a local support group. Find out if you qualify for home health care visits to help with rehab or for adult day care. When should you call for help? Call 911 anytime you think you may need emergency care. For example, call if:  You have signs of another stroke. These may include:  Sudden numbness, paralysis, or weakness in your face, arm, or leg, especially on only one side of your body. New problems with walking or balance. Sudden vision changes. Drooling or slurred speech. New problems speaking or understanding simple statements, or you feel confused. A sudden, severe headache that is different from past headaches. Call 911 even if these symptoms go away in a few minutes. You cough up blood. You vomit blood or what looks like coffee grounds. You pass maroon or very bloody stools. Call your doctor now or seek immediate medical care if:  You have new bruises or blood spots under your skin. You have a nosebleed. Your gums bleed when you brush your teeth. You have blood in your urine.   Your stools are black and tarlike or have streaks of blood.  You have vaginal bleeding when you are not having your period, or heavy period bleeding. You have new symptoms that may be related to your stroke, such as falls or trouble swallowing. Watch closely for changes in your health, and be sure to contact your doctor if you have any problems. Where can you learn more? Go to [employer]    Enter C294  in the search box to learn more about \"Stroke: After Your Visit\". © 7175-9555 [employer]. Care instructions adapted under license by [employer] (which disclaims liability or warranty for this information). This care instruction is for use with your licensed healthcare professional. If you have questions about a medical condition or this instruction, always ask your healthcare professional. [place] any warranty or liability for your use of this information.

## 2020-12-03 NOTE — PROGRESS NOTES
Patient alert and oriented. Follow commands. Speech is clear.  are equal. No facial droop. Patient voice no complaints. Sinus isai on cardiac monitor. Heart rate 59. Instructed patient to call for any needs. Verbalize an understanding. Bed in low/lock position. Bed alarm on.

## 2020-12-03 NOTE — DISCHARGE SUMMARY
Hospitalist Discharge Summary     Patient ID:  Tian Haley  [id number]  63 y.o.  1945  Admit date: 12/1/2020 10:57 AM  Discharge date and time: 12/3/2020  Attending: Kameron Sharif DO  PCP:  Elio Pena MD  Treatment Team: Attending Provider: Kameron Sharif DO; Care Manager: Salazar Sanchez RN; Utilization Review: Nancy Bailon RN; Physical Therapist: Cinthya Harper PT; Primary Nurse: Daniel Ramirez RN    Principal Diagnosis Visual changes   Principal Problem:    Visual changes (12/1/2020)    Active Problems:    Hypertension ()      GERD (gastroesophageal reflux disease) ()      Hyperlipemia ()      Stage 1 mild COPD by GOLD classification ([address] ()      Thoracic ascending aortic aneurysm ([address] ()      Paroxysmal atrial fibrillation ([address] (5/18/2020)      Thyroid nodule (12/1/2020)      Aneurysm, carotid artery, internal (12/1/2020)      Unresponsive (12/1/2020)     Hospital Course: 75 y. o. female who presented to the ED for cc difficulty with vision since 8:00 AM on day of admission. No TPA given. NIH score 3. Hx of paroxysmal a fib on Eliquis, vertigo,  Ascending aortic anerysm followed by Dr. 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 CT head with no acute changes. CT perfusion study negative. CTA head neck showed 3 mm intracranial left internal carotid artery aneurysm. No proximal vessel occlusion or significant stenosis in the major intracranial arterial vasculature. Negative CTA neck exam for significant stenosis or occlusion in the major cervical arterial vasculature. 11 mm right thyroid lobe nodule. This could be further evaluated with a nonurgent thyroid ultrasound exam. Was admitted for CVA work up. Shortly after admission, NIH score increased to 7 and then became unresponsive. Code S called. Bradycardia also noted per nursing staff when became unresponsive.  Repeat CT perfusion, CT head, MRI, and CT chest with contrast performed to ensure no acute stroke or dissection of aortic aneurysm. Findings showed no acute CVA and no dissection.  so statin started and A1 5.8. Patient refusing statin so will not discharge with atorvastatin. EEG with no seizure like activity. NIH today is 1. Neurology has recommended follow up with neuro ophthalmology Dr. Taiwo Harrell. Also recommended event monitor to ensure no sick sinus syndrome that has caused her symptoms. I have called our cardiology group who is in the process of setting up appointment for her to  cardiac monitor today. BP normal without medications. Stated to hold BP medications and restart lisinopril/HCTZ if systolic >493. Hold BB for now since bradycardic at times during hospital stay. Due to visual changes, stated she cannot drive or work heavy machinery. Internal carotid artery aneurysm - Monitor. 3mm so risk of rupture low. Keep BP controlled. Thyroid nodule - Will need outpatient thyroid US    If worsening AMS, visual changes, chest pain, or SOB, please come back to the ED. Please refer to the admission H&P for details of presentation. In summary, the patient is stable for discharge. Significant Diagnostic Studies:       Labs: Results:       Chemistry Recent Labs     12/03/20  0302 12/02/20  0422 12/01/20  1108   GLU 88 90 89    139 137   K 3.6 3.0* 3.0*    104 98   CO2 29 30 31   BUN 11 8 12   CREA 0.57* 0.61 0.69   CA 8.1* 8.7 9.2   AGAP 5* 5* 8      CBC w/Diff Recent Labs     12/01/20  1108   WBC 9.3   RBC 4.25   HGB 14.4   HCT 41.4         Cardiac Enzymes No results for input(s): CPK, CKND1, MENA in the last 72 hours. No lab exists for component: CKRMB, TROIP   Coagulation No results for input(s): PTP, INR, APTT, INREXT in the last 72 hours.     Lipid Panel Lab Results   Component Value Date/Time    Cholesterol, total 198 12/01/2020 11:08 AM    HDL Cholesterol 68 (H) 12/01/2020 11:08 AM    LDL, calculated 107.2 (H) 12/01/2020 11:08 AM    VLDL, calculated 22.8 12/01/2020 11:08 AM    Triglyceride 114 12/01/2020 11:08 AM    CHOL/HDL Ratio 2.9 12/01/2020 11:08 AM      BNP No results for input(s): BNPP in the last 72 hours. Liver Enzymes No results for input(s): TP, ALB, TBIL, AP in the last 72 hours. No lab exists for component: SGOT, GPT, DBIL   Thyroid Studies Lab Results   Component Value Date/Time    TSH 3.460 12/01/2020 11:08 AM            Discharge Exam:  Visit Vitals  /70   Pulse 63   Temp 98 °F (36.7 °C)   Resp 25   Wt 65.4 kg (144 lb 2.9 oz)   SpO2 93%   BMI 28.16 kg/m²     General appearance: alert, cooperative, no distress, appears stated age  Lungs: clear to auscultation bilaterally  Heart: regular rate and rhythm, S1, S2 normal, no murmur  Abdomen: soft, non-tender. Bowel sounds normal.   Extremities: difficulty moving her left arm but better today   Neurologic: normal other than left arm    Disposition:Home Health   Discharge Condition: stable  Patient Instructions: As above   Current Discharge Medication List      CONTINUE these medications which have NOT CHANGED    Details   esomeprazole (NEXIUM) 40 mg capsule Take 1 Cap by mouth daily as needed (GERD). Qty: 90 Cap, Refills: 3    Associated Diagnoses: Gastroesophageal reflux disease, esophagitis presence not specified      Eliquis 5 mg tablet Take 1 Tab by mouth two (2) times a day. Qty: 180 Tab, Refills: 3    Associated Diagnoses: Paroxysmal atrial fibrillation (HCC)      multivitamin (ONE A DAY) tablet Take 1 Tab by mouth daily. ascorbic acid, vitamin C, (Vitamin C) 1,000 mg tablet Take  by mouth. cholecalciferol, vitamin D3, (VITAMIN D3 PO) Take  by mouth. POTASSIUM-99 PO Take  by mouth. CALCIUM-MAGNESIUM PO Take  by mouth.          STOP taking these medications       metoprolol succinate (TOPROL-XL) 25 mg XL tablet Comments:   Reason for Stopping:         losartan-hydroCHLOROthiazide (HYZAAR) 100-25 mg per tablet Comments:   Reason for Stopping:               Activity:Up and yamini   Diet:Cardiac   Wound Care:None     Follow-up PCP in one week. Dr. Lefty De La Torre in one month. Dr. Dedra Duarte in two weeks. Dr. Juana Uriostegui, neuro ophthalmology in one week.   ·     Time spent to discharge patient 35 minutes  Signed:  Kristin Bernabe DO  12/3/2020  8:22 AM

## 2021-02-02 PROBLEM — H53.9 VISUAL CHANGES: Status: RESOLVED | Noted: 2020-12-01 | Resolved: 2021-02-02

## 2021-02-02 PROBLEM — R42 VERTIGO: Status: RESOLVED | Noted: 2020-10-06 | Resolved: 2021-02-02

## 2021-02-02 PROBLEM — R41.89 UNRESPONSIVE: Status: RESOLVED | Noted: 2020-12-01 | Resolved: 2021-02-02

## 2021-02-02 PROBLEM — E04.1 THYROID NODULE INCIDENTALLY NOTED ON IMAGING STUDY: Status: RESOLVED | Noted: 2020-12-01 | Resolved: 2021-02-02

## 2021-07-20 ENCOUNTER — APPOINTMENT (RX ONLY)
Dept: URBAN - METROPOLITAN AREA CLINIC 23 | Facility: CLINIC | Age: 76
Setting detail: DERMATOLOGY
End: 2021-07-20

## 2021-07-20 DIAGNOSIS — Z71.89 OTHER SPECIFIED COUNSELING: ICD-10-CM

## 2021-07-20 DIAGNOSIS — L57.8 OTHER SKIN CHANGES DUE TO CHRONIC EXPOSURE TO NONIONIZING RADIATION: ICD-10-CM

## 2021-07-20 DIAGNOSIS — Z41.9 ENCOUNTER FOR PROCEDURE FOR PURPOSES OTHER THAN REMEDYING HEALTH STATE, UNSPECIFIED: ICD-10-CM

## 2021-07-20 DIAGNOSIS — L82.1 OTHER SEBORRHEIC KERATOSIS: ICD-10-CM

## 2021-07-20 PROBLEM — D23.0 OTHER BENIGN NEOPLASM OF SKIN OF LIP: Status: ACTIVE | Noted: 2021-07-20

## 2021-07-20 PROCEDURE — ? OTHER

## 2021-07-20 PROCEDURE — ? COUNSELING

## 2021-07-20 PROCEDURE — ? MEDICAL CONSULTATION: FILLERS

## 2021-07-20 PROCEDURE — 99213 OFFICE O/P EST LOW 20 MIN: CPT

## 2021-07-20 ASSESSMENT — LOCATION DETAILED DESCRIPTION DERM
LOCATION DETAILED: LEFT INFERIOR MEDIAL MALAR CHEEK
LOCATION DETAILED: RIGHT INFERIOR MEDIAL MALAR CHEEK
LOCATION DETAILED: LEFT INFERIOR MEDIAL FOREHEAD
LOCATION DETAILED: RIGHT MEDIAL UPPER BACK
LOCATION DETAILED: RIGHT INFERIOR ANTERIOR NECK
LOCATION DETAILED: LEFT SUPERIOR LATERAL MALAR CHEEK

## 2021-07-20 ASSESSMENT — LOCATION ZONE DERM
LOCATION ZONE: FACE
LOCATION ZONE: NECK
LOCATION ZONE: TRUNK

## 2021-07-20 ASSESSMENT — LOCATION SIMPLE DESCRIPTION DERM
LOCATION SIMPLE: LEFT CHEEK
LOCATION SIMPLE: RIGHT UPPER BACK
LOCATION SIMPLE: RIGHT CHEEK
LOCATION SIMPLE: RIGHT ANTERIOR NECK
LOCATION SIMPLE: LEFT FOREHEAD

## 2021-07-20 NOTE — PROCEDURE: OTHER
Detail Level: Detailed
Note Text (......Xxx Chief Complaint.): This diagnosis correlates with the
Other (Free Text): Patient was provided with information for [facility] plastics.
Other (Free Text): She had Juvaderm a few years ago and would like to repeat that treatment in [facility] .
Render Risk Assessment In Note?: no

## 2021-09-01 ENCOUNTER — APPOINTMENT (RX ONLY)
Dept: URBAN - METROPOLITAN AREA CLINIC 23 | Facility: CLINIC | Age: 76
Setting detail: DERMATOLOGY
End: 2021-09-01

## 2021-09-01 DIAGNOSIS — L98.8 OTHER SPECIFIED DISORDERS OF THE SKIN AND SUBCUTANEOUS TISSUE: ICD-10-CM

## 2021-09-01 PROCEDURE — ? COSMETIC CONSULTATION: FILLERS

## 2021-09-01 PROCEDURE — ? FILLERS

## 2021-09-01 NOTE — PROCEDURE: FILLERS
Cheeks Filler Volume In Cc: 0
Include Cannula Information In Note?: No
Include Cannula Size?: 25G
Detail Level: Detailed
Include Cannula Length?: 1.5 inch
Include Cannula Information In Note?: Yes
Marionette Lines Filler Volume In Cc: 0.5
Topical Anesthesia?: EMLA
Map Statment: See Attach Map for Complete Details
Price (Use Numbers Only, No Special Characters Or $): 286
Additional Area 1 Location: Under eyes
Include Cannula Brand?: DermaSculpt
Expiration Date (Month Year): 11/2022
Filler: Juvederm Ultra Plus XC
Post-Care Instructions: Patient instructed to apply ice to reduce swelling.
Consent: Written consent obtained. Risks include but not limited to bruising, beading, irregular texture, ulceration, infection, allergic reaction, scar formation, incomplete augmentation, temporary nature, procedural pain.
Lot #: [id number]

## 2021-09-02 ENCOUNTER — HOSPITAL ENCOUNTER (OUTPATIENT)
Dept: CT IMAGING | Age: 76
Discharge: HOME | End: 2021-09-02
Attending: PHYSICIAN ASSISTANT

## 2021-09-02 DIAGNOSIS — I71.20 THORACIC AORTIC ANEURYSM WITHOUT RUPTURE: ICD-10-CM

## 2021-09-02 RX ORDER — SODIUM CHLORIDE 0.9 % (FLUSH) 0.9 %
10 SYRINGE (ML) INJECTION
Status: COMPLETED | OUTPATIENT
Start: 2021-09-02 | End: 2021-09-02

## 2021-09-02 RX ADMIN — Medication 10 ML: at 09:45

## 2022-03-18 PROBLEM — I67.1 ANEURYSM, CAROTID ARTERY, INTERNAL: Status: ACTIVE | Noted: 2020-12-01

## 2022-03-19 PROBLEM — Z78.9 STATIN INTOLERANCE: Status: ACTIVE | Noted: 2020-04-29

## 2022-03-19 PROBLEM — Z86.73 HISTORY OF STROKE: Status: ACTIVE | Noted: 2020-04-29

## 2022-03-19 PROBLEM — M54.50 LOW BACK PAIN: Status: ACTIVE | Noted: 2020-02-14

## 2022-03-20 PROBLEM — I48.0 PAROXYSMAL ATRIAL FIBRILLATION (HCC): Status: ACTIVE | Noted: 2020-05-18

## 2022-08-02 ENCOUNTER — APPOINTMENT (RX ONLY)
Dept: URBAN - METROPOLITAN AREA CLINIC 23 | Facility: CLINIC | Age: 77
Setting detail: DERMATOLOGY
End: 2022-08-02

## 2022-08-02 DIAGNOSIS — D18.0 HEMANGIOMA: ICD-10-CM

## 2022-08-02 DIAGNOSIS — L81.4 OTHER MELANIN HYPERPIGMENTATION: ICD-10-CM

## 2022-08-02 DIAGNOSIS — L57.8 OTHER SKIN CHANGES DUE TO CHRONIC EXPOSURE TO NONIONIZING RADIATION: ICD-10-CM

## 2022-08-02 DIAGNOSIS — D22 MELANOCYTIC NEVI: ICD-10-CM

## 2022-08-02 DIAGNOSIS — L82.1 OTHER SEBORRHEIC KERATOSIS: ICD-10-CM

## 2022-08-02 PROBLEM — D22.5 MELANOCYTIC NEVI OF TRUNK: Status: ACTIVE | Noted: 2022-08-02

## 2022-08-02 PROBLEM — D22.72 MELANOCYTIC NEVI OF LEFT LOWER LIMB, INCLUDING HIP: Status: ACTIVE | Noted: 2022-08-02

## 2022-08-02 PROBLEM — D18.01 HEMANGIOMA OF SKIN AND SUBCUTANEOUS TISSUE: Status: ACTIVE | Noted: 2022-08-02

## 2022-08-02 PROBLEM — D23.0 OTHER BENIGN NEOPLASM OF SKIN OF LIP: Status: ACTIVE | Noted: 2022-08-02

## 2022-08-02 PROCEDURE — 99213 OFFICE O/P EST LOW 20 MIN: CPT

## 2022-08-02 PROCEDURE — ? COUNSELING

## 2022-08-02 ASSESSMENT — LOCATION SIMPLE DESCRIPTION DERM
LOCATION SIMPLE: LEFT UPPER BACK
LOCATION SIMPLE: RIGHT SHOULDER
LOCATION SIMPLE: LEFT FOREARM
LOCATION SIMPLE: UPPER BACK
LOCATION SIMPLE: RIGHT THIGH
LOCATION SIMPLE: CHEST
LOCATION SIMPLE: RIGHT UPPER BACK
LOCATION SIMPLE: LEFT CHEEK
LOCATION SIMPLE: LEFT THIGH
LOCATION SIMPLE: LEFT SHOULDER
LOCATION SIMPLE: LEFT LOWER BACK
LOCATION SIMPLE: RIGHT FOREARM

## 2022-08-02 ASSESSMENT — LOCATION ZONE DERM
LOCATION ZONE: ARM
LOCATION ZONE: TRUNK
LOCATION ZONE: LEG
LOCATION ZONE: FACE

## 2022-08-02 ASSESSMENT — LOCATION DETAILED DESCRIPTION DERM
LOCATION DETAILED: RIGHT MEDIAL UPPER BACK
LOCATION DETAILED: INFERIOR THORACIC SPINE
LOCATION DETAILED: LEFT ANTERIOR DISTAL THIGH
LOCATION DETAILED: LEFT PROXIMAL DORSAL FOREARM
LOCATION DETAILED: RIGHT VENTRAL PROXIMAL FOREARM
LOCATION DETAILED: LEFT SUPERIOR LATERAL MALAR CHEEK
LOCATION DETAILED: MIDDLE STERNUM
LOCATION DETAILED: LEFT SUPERIOR LATERAL MIDBACK
LOCATION DETAILED: LEFT LATERAL SUPERIOR CHEST
LOCATION DETAILED: LEFT SUPERIOR UPPER BACK
LOCATION DETAILED: LEFT ANTERIOR PROXIMAL THIGH
LOCATION DETAILED: RIGHT MEDIAL SUPERIOR CHEST
LOCATION DETAILED: LEFT POSTERIOR SHOULDER
LOCATION DETAILED: RIGHT LATERAL SUPERIOR CHEST
LOCATION DETAILED: RIGHT PROXIMAL DORSAL FOREARM
LOCATION DETAILED: RIGHT SUPERIOR UPPER BACK
LOCATION DETAILED: RIGHT ANTERIOR DISTAL THIGH
LOCATION DETAILED: RIGHT POSTERIOR SHOULDER
LOCATION DETAILED: RIGHT INFERIOR UPPER BACK
LOCATION DETAILED: LEFT VENTRAL DISTAL FOREARM

## 2022-08-18 ENCOUNTER — OFFICE VISIT (OUTPATIENT)
Dept: CARDIOLOGY CLINIC | Age: 77
End: 2022-08-18
Payer: MEDICARE

## 2022-08-18 VITALS
SYSTOLIC BLOOD PRESSURE: 140 MMHG | HEIGHT: 60 IN | HEART RATE: 50 BPM | WEIGHT: 144.8 LBS | DIASTOLIC BLOOD PRESSURE: 80 MMHG | BODY MASS INDEX: 28.43 KG/M2

## 2022-08-18 DIAGNOSIS — E78.00 PURE HYPERCHOLESTEROLEMIA: ICD-10-CM

## 2022-08-18 DIAGNOSIS — I71.21 THORACIC ASCENDING AORTIC ANEURYSM: ICD-10-CM

## 2022-08-18 DIAGNOSIS — I10 PRIMARY HYPERTENSION: ICD-10-CM

## 2022-08-18 DIAGNOSIS — Z78.9 STATIN INTOLERANCE: ICD-10-CM

## 2022-08-18 DIAGNOSIS — I48.0 PAROXYSMAL ATRIAL FIBRILLATION (HCC): Primary | ICD-10-CM

## 2022-08-18 PROCEDURE — 1123F ACP DISCUSS/DSCN MKR DOCD: CPT | Performed by: INTERNAL MEDICINE

## 2022-08-18 PROCEDURE — 99214 OFFICE O/P EST MOD 30 MIN: CPT | Performed by: INTERNAL MEDICINE

## 2022-08-18 RX ORDER — EZETIMIBE 10 MG/1
10 TABLET ORAL DAILY
Qty: 90 TABLET | Refills: 3 | Status: SHIPPED | OUTPATIENT
Start: 2022-08-18

## 2022-08-18 ASSESSMENT — PATIENT HEALTH QUESTIONNAIRE - PHQ9
SUM OF ALL RESPONSES TO PHQ9 QUESTIONS 1 & 2: 0
SUM OF ALL RESPONSES TO PHQ QUESTIONS 1-9: 0
1. LITTLE INTEREST OR PLEASURE IN DOING THINGS: 0
2. FEELING DOWN, DEPRESSED OR HOPELESS: 0

## 2022-08-18 ASSESSMENT — ENCOUNTER SYMPTOMS: SHORTNESS OF BREATH: 0

## 2022-08-18 NOTE — PROGRESS NOTES
[address], [address] [facility], [address] [place] [address]  PHONE: [phone]    Sandra Hatfield  1945      SUBJECTIVE:   Sandra Hatfield is a 67 y.o. female seen for a follow up visit regarding the following:     Chief Complaint   Patient presents with    Hypertension    Irregular Heart Beat     6 month visit and med review       HPI:    67-year-old female comes back for follow-up she had some PAF in the past but could not take Multaq in the past she is taking her Xarelto at this time. She has not had any recent complaints of palpitations. She had a small stroke in the past.  She says she is currently staying on her Xarelto. She had a mildly dilated thoracic aorta CT scan less than a year ago was around 4.2 cm. She had difficulty taking statins and cannot take any of them. I put her on Zetia which she is tolerating some improvement in cholesterol. She calcification and plaque noted and we will try to get the LDL down further      Past Medical History, Past Surgical History, Family history, Social History, and Medications were all reviewed with the patient today and updated as necessary. Allergies   Allergen Reactions    Atorvastatin Other (See Comments)     Patient states had a bad reaction to this medicine. But cant remember.      Past Medical History:   Diagnosis Date    Acute ischemic stroke ([facility] [address] 12/22/2019    Aortic atherosclerosis (HCC)     Arthritis     Ascending aortic aneurysm (HCC)     Atrial fibrillation (HCC)     GERD (gastroesophageal reflux disease)     Hiatal hernia     Hyperlipidemia     Hypertension     Multiple thyroid nodules     Osteopenia     Status post total replacement of right hip     Thyroid nodule incidentally noted on imaging study 12/1/2020    Unilateral primary osteoarthritis, right hip     Vitamin D deficiency      Past Surgical History:   Procedure Laterality Date    CARPAL TUNNEL RELEASE Bilateral 2004, 2005    CATARACT REMOVAL Bilateral 07/10/2019    TONSILLECTOMY TOTAL HIP ARTHROPLASTY Right 2019    TUBAL LIGATION       Family History   Problem Relation Age of Onset    COPD Sister     Stroke Mother     Lung Disease Father         collapsed lung; tobacco abuse     Heart Attack Father     Thyroid Cancer Mother     Diabetes Sister     Cancer Mother         cervical    Diabetes Mother     Thyroid Disease Niece         treated with medication    Cancer Brother         liver    Diabetes Brother       Social History     Tobacco Use    Smoking status: Former     Packs/day: 0.50     Types: Cigarettes     Quit date: 2005     Years since quittin.0    Smokeless tobacco: Never    Tobacco comments:     Quit smoking: 10-11 yrs ago quit   Substance Use Topics    Alcohol use: Yes     Alcohol/week: 1.0 standard drink       ROS:    Review of Systems   Constitutional: Negative for decreased appetite and weight loss. Cardiovascular:  Negative for chest pain, dyspnea on exertion, irregular heartbeat, leg swelling and palpitations. Respiratory:  Negative for shortness of breath. PHYSICAL EXAM:    BP (!) 140/80   Pulse 50   Ht 5' (1.524 m)   Wt 144 lb 12.8 oz (65.7 kg) Comment: with shoes  BMI 28.28 kg/m²        Wt Readings from Last 3 Encounters:   22 144 lb 12.8 oz (65.7 kg)   21 134 lb (60.8 kg)   21 139 lb (63 kg)     BP Readings from Last 3 Encounters:   22 (!) 140/80   22 120/64   02/15/22 136/84         Physical Exam  Constitutional:       General: She is not in acute distress. Cardiovascular:      Rate and Rhythm: Normal rate and regular rhythm. Pulses: Normal pulses. Pulmonary:      Effort: Pulmonary effort is normal.   Musculoskeletal:         General: No swelling. Skin:     General: Skin is warm. Neurological:      Mental Status: She is alert. Medical problems and test results were reviewed with the patient today. No results found for any visits on 22.   Lab Results   Component Value Date/Time    NA 138 05/04/2022 11:57 AM    K 4.6 05/04/2022 11:57 AM    CL 97 05/04/2022 11:57 AM    CO2 26 05/04/2022 11:57 AM    BUN 12 05/04/2022 11:57 AM    GFRAA 96 10/28/2021 08:08 AM     Lab Results   Component Value Date/Time    CHOL 157 05/04/2022 11:57 AM    HDL 55 05/04/2022 11:57 AM    VLDL 14 05/04/2022 11:57 AM         ASSESSMENT and PLAN    Magdalene was seen today for hypertension and irregular heart beat. Diagnoses and all orders for this visit:    Paroxysmal atrial fibrillation (HCC) bouts of atrial fibrillation (off any antiarrhythmic therapy we will however continue on her blood thinner. Thoracic ascending aortic aneurysm [facility]) she is currently stable she probably does not need another CT scan may be till mid next year. Primary hypertension her blood pressure little high today but she says doing good in 120s 130s at home. She is on her Hyzaar 100-25 needs to continue to check blood pressure    Pure hypercholesterolemia improvement in overall cholesterol LDL down to 88 I will add her on this new drug Nexletol 180/day added to the Zetia safely bring that down further  -     Lipid Panel; Future    Statin intolerance has never been able to take statins or not try again she says. Other orders  -     ezetimibe (ZETIA) 10 MG tablet; Take 1 tablet by mouth daily  -     rivaroxaban (XARELTO) 20 MG TABS tablet; Take 1 tablet by mouth daily  -     Bempedoic Acid 180 MG TABS; Take 180 mg by mouth daily      [unfilled]      No follow-up provider specified.     Ayala Cannon MD  8/18/2022  8:42 AM

## 2022-09-01 ENCOUNTER — CASE MANAGEMENT (OUTPATIENT)
Dept: CARDIOLOGY CLINIC | Age: 77
End: 2022-09-01

## 2022-09-01 NOTE — LETTER
[facility] [[email or web address]] CARDIOLOGY  [address] [place] [address] Drive [address]  Phone: [phone]  Fax: [phone]    Yunier Huynh MD    2022    ATTN: [address]  FAX: [phone]    RE: Chi Whittington  : 1945  Member ID: [id number]    To Whom It May Concern:     I am writing to appeal your denial of coverage for Nexletol 180mg for Ms. Hien Jeff. In her case, Nexletol is the best medication to treat her condition. Ms. Elvin Chapman has a history of atherosclerosis of the aorta in the setting of multiple strokes/CVAs and TIAs notable for significant small vessel cerebrovascular disease. This does fall into the broader category of atherosclerotic cardiovascular disease (ASCVD). She has tried and cannot tolerate statins (atorvastatin, rosuvastatin, simvastatin). Her most recent lipid panel (2022) showed a LDL-C of 88mg/dL. Given her history, it is recommended that her LDL should be > 70mg/dL. She will take Nexletol with ezetimibe. Please allow coverage of Nexletol 180mg at the lowest possible tier. Feel free to contact my office if we may be of assistance in processing this request.  I appreciate your consideration for Ms. Whittington in this matter.       Sincerely,      Yunier Huynh MD   [facility] Cardiology  [place]

## 2022-09-01 NOTE — PROGRESS NOTES
Received notice from [facility]. The request for Nexletol 180mg has been DENIED. Reason for Denial: \"The accepted use of this drug fro your condition requires a diagnosis of heterozygoous familial hypercholesterolemia (HeFH) OR established atherosclerotic cardiovascular disease (ASCVD)\"    Appeal letter needs to be sent to  ATTN: [address]: [phone]     Patient has a history of CVA notable of small vessel cerebrovascular disease as well as atherosclerosis of aorta noted on CT scan. Ms. Dieudonne Sotomayor has a history of intolerance to statins. She has been taking ezetimibe. Her most recent lipid panel (5/04/2022) showed an LDL of 88 mg/mL.   Her LDL in October 2021 was 125 mg/mL     [place] written, [place] [facility] [place]./smb

## 2022-09-27 ENCOUNTER — TELEPHONE (OUTPATIENT)
Dept: CARDIOLOGY CLINIC | Age: 77
End: 2022-09-27

## 2022-09-27 NOTE — TELEPHONE ENCOUNTER
Patient called stating she is wanting to discuss being reassigned to another provider. Please call and advise.

## 2022-09-28 ENCOUNTER — TELEPHONE (OUTPATIENT)
Dept: CARDIOLOGY CLINIC | Age: 77
End: 2022-09-28

## 2022-09-28 NOTE — TELEPHONE ENCOUNTER
Received faxed notification that [facility] has approved Nexletol. Called and informed pt/  Pt states too expensive, will cost her $400.

## 2022-11-10 DIAGNOSIS — E78.00 PURE HYPERCHOLESTEROLEMIA: Primary | ICD-10-CM

## 2022-11-10 DIAGNOSIS — I48.0 PAROXYSMAL ATRIAL FIBRILLATION (HCC): ICD-10-CM

## 2022-11-10 DIAGNOSIS — E78.00 PURE HYPERCHOLESTEROLEMIA: ICD-10-CM

## 2022-11-10 LAB
ALBUMIN SERPL-MCNC: 3.7 G/DL (ref 3.2–4.6)
ALBUMIN/GLOB SERPL: 1.1 {RATIO} (ref 0.4–1.6)
ALP SERPL-CCNC: 66 U/L (ref 50–136)
ALT SERPL-CCNC: 21 U/L (ref 12–65)
ANION GAP SERPL CALC-SCNC: 5 MMOL/L (ref 2–11)
AST SERPL-CCNC: 23 U/L (ref 15–37)
BASOPHILS # BLD: 0.1 K/UL (ref 0–0.2)
BASOPHILS NFR BLD: 1 % (ref 0–2)
BILIRUB SERPL-MCNC: 0.5 MG/DL (ref 0.2–1.1)
BUN SERPL-MCNC: 14 MG/DL (ref 8–23)
CALCIUM SERPL-MCNC: 9.6 MG/DL (ref 8.3–10.4)
CHLORIDE SERPL-SCNC: 102 MMOL/L (ref 101–110)
CHOLEST SERPL-MCNC: 149 MG/DL
CO2 SERPL-SCNC: 30 MMOL/L (ref 21–32)
CREAT SERPL-MCNC: 0.8 MG/DL (ref 0.6–1)
DIFFERENTIAL METHOD BLD: ABNORMAL
EOSINOPHIL # BLD: 0.2 K/UL (ref 0–0.8)
EOSINOPHIL NFR BLD: 3 % (ref 0.5–7.8)
ERYTHROCYTE [DISTWIDTH] IN BLOOD BY AUTOMATED COUNT: 12.6 % (ref 11.9–14.6)
GLOBULIN SER CALC-MCNC: 3.3 G/DL (ref 2.8–4.5)
GLUCOSE SERPL-MCNC: 105 MG/DL (ref 65–100)
HCT VFR BLD AUTO: 40.3 % (ref 35.8–46.3)
HDLC SERPL-MCNC: 58 MG/DL (ref 40–60)
HDLC SERPL: 2.6 {RATIO}
HGB BLD-MCNC: 13.3 G/DL (ref 11.7–15.4)
IMM GRANULOCYTES # BLD AUTO: 0 K/UL (ref 0–0.5)
IMM GRANULOCYTES NFR BLD AUTO: 0 % (ref 0–5)
LDLC SERPL CALC-MCNC: 75.8 MG/DL
LYMPHOCYTES # BLD: 2.1 K/UL (ref 0.5–4.6)
LYMPHOCYTES NFR BLD: 30 % (ref 13–44)
MCH RBC QN AUTO: 33 PG (ref 26.1–32.9)
MCHC RBC AUTO-ENTMCNC: 33 G/DL (ref 31.4–35)
MCV RBC AUTO: 100 FL (ref 82–102)
MONOCYTES # BLD: 0.7 K/UL (ref 0.1–1.3)
MONOCYTES NFR BLD: 10 % (ref 4–12)
NEUTS SEG # BLD: 4 K/UL (ref 1.7–8.2)
NEUTS SEG NFR BLD: 56 % (ref 43–78)
NRBC # BLD: 0 K/UL (ref 0–0.2)
PLATELET # BLD AUTO: 240 K/UL (ref 150–450)
PMV BLD AUTO: 11.7 FL (ref 9.4–12.3)
POTASSIUM SERPL-SCNC: 3.8 MMOL/L (ref 3.5–5.1)
PROT SERPL-MCNC: 7 G/DL (ref 6.3–8.2)
RBC # BLD AUTO: 4.03 M/UL (ref 4.05–5.2)
SODIUM SERPL-SCNC: 137 MMOL/L (ref 133–143)
TRIGL SERPL-MCNC: 76 MG/DL (ref 35–150)
VLDLC SERPL CALC-MCNC: 15.2 MG/DL (ref 6–23)
WBC # BLD AUTO: 7.1 K/UL (ref 4.3–11.1)

## 2022-11-17 ENCOUNTER — OFFICE VISIT (OUTPATIENT)
Dept: INTERNAL MEDICINE CLINIC | Facility: CLINIC | Age: 77
End: 2022-11-17
Payer: MEDICARE

## 2022-11-17 VITALS
BODY MASS INDEX: 27.88 KG/M2 | SYSTOLIC BLOOD PRESSURE: 122 MMHG | OXYGEN SATURATION: 100 % | DIASTOLIC BLOOD PRESSURE: 72 MMHG | HEIGHT: 60 IN | WEIGHT: 142 LBS | TEMPERATURE: 97 F | HEART RATE: 55 BPM

## 2022-11-17 DIAGNOSIS — R73.9 BLOOD GLUCOSE ELEVATED: ICD-10-CM

## 2022-11-17 DIAGNOSIS — E78.00 PURE HYPERCHOLESTEROLEMIA: ICD-10-CM

## 2022-11-17 DIAGNOSIS — M65.341 TRIGGER FINGER, RIGHT RING FINGER: ICD-10-CM

## 2022-11-17 DIAGNOSIS — I10 PRIMARY HYPERTENSION: Primary | ICD-10-CM

## 2022-11-17 PROBLEM — D49.6 NEOPLASM OF UNSPECIFIED BEHAVIOR OF BRAIN (HCC): Status: RESOLVED | Noted: 2022-11-17 | Resolved: 2022-11-17

## 2022-11-17 PROBLEM — D49.6 NEOPLASM OF UNSPECIFIED BEHAVIOR OF BRAIN (HCC): Status: ACTIVE | Noted: 2022-11-17

## 2022-11-17 PROCEDURE — G8400 PT W/DXA NO RESULTS DOC: HCPCS | Performed by: INTERNAL MEDICINE

## 2022-11-17 PROCEDURE — 20600 DRAIN/INJ JOINT/BURSA W/O US: CPT | Performed by: INTERNAL MEDICINE

## 2022-11-17 PROCEDURE — 3078F DIAST BP <80 MM HG: CPT | Performed by: INTERNAL MEDICINE

## 2022-11-17 PROCEDURE — 1123F ACP DISCUSS/DSCN MKR DOCD: CPT | Performed by: INTERNAL MEDICINE

## 2022-11-17 PROCEDURE — 1036F TOBACCO NON-USER: CPT | Performed by: INTERNAL MEDICINE

## 2022-11-17 PROCEDURE — 99214 OFFICE O/P EST MOD 30 MIN: CPT | Performed by: INTERNAL MEDICINE

## 2022-11-17 PROCEDURE — G8427 DOCREV CUR MEDS BY ELIG CLIN: HCPCS | Performed by: INTERNAL MEDICINE

## 2022-11-17 PROCEDURE — G8417 CALC BMI ABV UP PARAM F/U: HCPCS | Performed by: INTERNAL MEDICINE

## 2022-11-17 PROCEDURE — 3074F SYST BP LT 130 MM HG: CPT | Performed by: INTERNAL MEDICINE

## 2022-11-17 PROCEDURE — G8484 FLU IMMUNIZE NO ADMIN: HCPCS | Performed by: INTERNAL MEDICINE

## 2022-11-17 PROCEDURE — 1090F PRES/ABSN URINE INCON ASSESS: CPT | Performed by: INTERNAL MEDICINE

## 2022-11-17 RX ORDER — EZETIMIBE 10 MG/1
10 TABLET ORAL DAILY
Qty: 90 TABLET | Refills: 3 | Status: SHIPPED | OUTPATIENT
Start: 2022-11-17 | End: 2022-11-17 | Stop reason: SDUPTHER

## 2022-11-17 RX ORDER — EZETIMIBE 10 MG/1
10 TABLET ORAL DAILY
Qty: 90 TABLET | Refills: 3 | Status: SHIPPED | OUTPATIENT
Start: 2022-11-17

## 2022-11-17 RX ORDER — LOSARTAN POTASSIUM AND HYDROCHLOROTHIAZIDE 25; 100 MG/1; MG/1
1 TABLET ORAL DAILY
Qty: 90 TABLET | Refills: 3 | Status: SHIPPED | OUTPATIENT
Start: 2022-11-17

## 2022-11-17 RX ORDER — LOSARTAN POTASSIUM AND HYDROCHLOROTHIAZIDE 25; 100 MG/1; MG/1
1 TABLET ORAL DAILY
Qty: 90 TABLET | Refills: 1 | Status: SHIPPED | OUTPATIENT
Start: 2022-11-17 | End: 2022-11-17 | Stop reason: SDUPTHER

## 2022-11-17 NOTE — PROGRESS NOTES
ASSESSMENT/PLAN:    Procedure:  Right Ring Finger DIP Joint injection [fourth Injection]: After full discussion of the nature of Finger Joint osteoarthritis and outlining a treatment plan with Ms. Nia Patel, we discussed the complications, limitations, expectations, alternatives, and risks of injection to the arthritic joint. She understood this information well and verbally consented to this treatment. The skin of the symptomatic extremity was prepped with Isopropyl Alcohol and under aseptic conditions the Ring Finger DIP Joint joint was injected with a combinationRight ring finger triggering. Joint injection with 0.5 mg Celestone with diabetic needle on medial aspect of the finger. No complications. without Epinephrine. There was good filling of the joint space. A dry, sterile bandage was applied and she  tolerated the injection without difficulty. I advised her  of the expected response, possible reactions and the instructions for care of the hand. 1. Primary hypertension  Treatment regimen is effective. - Basic Metabolic Panel; Future  - CBC; Future  - losartan-hydroCHLOROthiazide (HYZAAR) 100-25 MG per tablet; Take 1 tablet by mouth daily  Dispense: 90 tablet; Refill: 3    2. Pure hypercholesterolemia  Improved. Treatment regimen is effective. - Lipid Panel; Future  - ALT; Future  - AST; Future  - ezetimibe (ZETIA) 10 MG tablet; Take 1 tablet by mouth daily  Dispense: 90 tablet; Refill: 3    3. Blood glucose elevated  The patient is asked to make an attempt to improve diet and exercise patterns to aid in medical management of this problem. - Hemoglobin A1C; Future  - Basic Metabolic Panel; Future    4. Trigger finger, right ring finger  Injection. See procedure. - 20600 - IN DRAIN/INJECT SMALL JOINT/BURSA         Evaluation and management of the chronic condition(s) delineated. No negative side effects reported. I have reviewed all the lab results.  There are some abnormalities that are either expected or not critical to the patient's health, and are discussed in the office today and are addressed. Please refer to the above assessement and plan narrative and orders and follow up plan. Medication discussed and refilled as needed. Physical exam findings are stable unless otherwise indicated and this is addressed. The most recent lab work and imaging and consultant/urgent care visits and imaging are reviewed and discussed and considered during this visit encounter. 1. Primary hypertension  -     Basic Metabolic Panel; Future  -     CBC; Future  -     losartan-hydroCHLOROthiazide (HYZAAR) 100-25 MG per tablet; Take 1 tablet by mouth daily, Disp-90 tablet, R-3Normal  2. Pure hypercholesterolemia  -     Lipid Panel; Future  -     ALT; Future  -     AST; Future  -     ezetimibe (ZETIA) 10 MG tablet; Take 1 tablet by mouth daily, Disp-90 tablet, R-3Normal  3. Blood glucose elevated  -     Hemoglobin A1C; Future  -     Basic Metabolic Panel; Future  4. Trigger finger, right ring finger  -      - MA DRAIN/INJECT SMALL JOINT/BURSA         On this date, 22, I have spent 30 minutes reviewing previous notes, test results and face to face with the patient discussing the diagnosis and importance of compliance with the treatment plan as well as documenting on the day of the visit. An electronic signature was used to authenticate this note. -- Joane Cogan, MD     Return in about 6 months (around 2023).     SUBJECTIVE/OBJECTIVE:      HPI:   Yolis Sin (: 1945 is a 67 y.o. female, here for evaluation of the following chief complaint(s):   Chief Complaint   Patient presents with    Hypertension     6 month recheck    Cholesterol Problem    Discuss Labs       Patient is here for follow-up and management of chronic medical conditions, review of recent labs, review of any imaging completed since our last office visit and discuss any consultants opinions or management changes. Had covid booster last Saturday, 11/12/22, and had a pretty bad reaction to Covid booster. Felt terrible. Fever, body aches, severe lethargy, no nausea or vomiting, severe fatigue and stayed in the bed all day and \"wasn't even aware of the day passing\"  Symptoms started less than 24 hours after vaccine. She says she is reluctant to have further boosters going forward. Hypertension - stable, well controlled, takes medications as prescribed. denies chest pain, shortness of breath, abdominal pain, nausea, vomiting, diarrhea, dizziness or fainting, headaches or vision changes. Key CAD CHF Meds            ezetimibe (ZETIA) 10 MG tablet (Taking)    Sig - Route: Take 1 tablet by mouth daily - Oral    losartan-hydroCHLOROthiazide (HYZAAR) 100-25 MG per tablet (Taking)    Sig - Route: Take 1 tablet by mouth daily - Oral    rivaroxaban (XARELTO) 20 MG TABS tablet (Taking)    Sig - Route: Take 1 tablet by mouth daily - Oral            Hyperlipidemia - stable, well controlled, takes medication as prescribed, denies any chest pain, shortness of breath, dizziness, headaches or tia symptoms. No reported myalgias on treatment. Statin intolerant. Bempedoic Acid cost prohibitive  Cardiology follow up notes reviewed. ezetimibe - 10 MG  Lab Results   Component Value Date    TRIG 76 11/10/2022     Lab Results   Component Value Date    HDL 58 11/10/2022     Lab Results   Component Value Date    LDLCALC 75.8 11/10/2022     Lab Results   Component Value Date    CHOLHDLRATIO 2.6 11/10/2022     ezetimibe - 10 MG    Trigger Finger  Patient complaints of right Ring finger DIP dislocation catching. This has been present several months however recently has become progressively more aggravating. This affects most every activity involving gripping and . Originally only a catching sensation was present, however now catching is present.  She presents today for evaluation  Treatment to date has been steroid injection which provided significant relief for about six months, with significant relief. She requests injection today. Hand surgery consultation discussed and offered. Prefers treatment today as she had good results for months. Labs reviewed and discussed and medication refilled as needed for chronic medications during ov or adjusted based on lab results and/or our discussion as appropriate. See discussion. The patient's available records and electronic chart records are reviewed. The PMH, PSH, medications, allergies, medications, FH, health maintenance and vaccination status are all reviewed and updated as appropriate. Records from outside providers have been reviewed, summarized, and considered as noted in the history of present illness, past medical history, and objective data of this note and encounter.           Health Maintenance   Topic Date Due    DEXA (modify frequency per FRAX score)  Never done    Pneumococcal 65+ years Vaccine (2 - PPSV23 if available, else PCV20) 02/01/2017    COVID-19 Vaccine (5 - Booster for Pfizer series) 01/07/2023    Annual Wellness Visit (AWV)  05/12/2023    Depression Screen  08/18/2023    DTaP/Tdap/Td vaccine (2 - Td or Tdap) 01/07/2031    Flu vaccine  Completed    Shingles vaccine  Completed    Hepatitis A vaccine  Aged Out    Hib vaccine  Aged Out    Meningococcal (ACWY) vaccine  Aged Out    Hepatitis C screen  Discontinued     Patient Active Problem List   Diagnosis    Hypertension    Aneurysm, carotid artery, internal    Osteoarthritis of basilar joint of thumb    Osteopenia    Low back pain    Thoracic ascending aortic aneurysm    Hyperlipemia    History of stroke    Aortic atherosclerosis (HCC)    Statin intolerance    GERD (gastroesophageal reflux disease)    Stage 1 mild COPD by GOLD classification (HCC)    Paroxysmal atrial fibrillation ([address]    Neoplasm of unspecified behavior of brain ([address]       Review of Systems    Lab Results   Component Value Date/Time    WBC 7.1 11/10/2022 07:55 AM    HGB 13.3 11/10/2022 07:55 AM    HCT 40.3 11/10/2022 07:55 AM    .0 11/10/2022 07:55 AM    RDW 12.6 11/10/2022 07:55 AM     11/10/2022 07:55 AM    NEUTOPHILPCT 58 05/04/2022 11:57 AM    LYMPHOPCT 30 11/10/2022 07:55 AM    LYMPHOPCT 27 05/04/2022 11:57 AM    MONOPCT 10 11/10/2022 07:55 AM    MONOPCT 11 05/04/2022 11:57 AM    EOSRELPCT 3 11/10/2022 07:55 AM    BASOPCT 1 11/10/2022 07:55 AM    BASOPCT 1 05/04/2022 11:57 AM    LYMPHSABS 2.1 11/10/2022 07:55 AM    LYMPHSABS 1.8 05/04/2022 11:57 AM    MONOSABS 0.7 11/10/2022 07:55 AM    MONOSABS 0.7 05/04/2022 11:57 AM    EOSABS 0.2 11/10/2022 07:55 AM    EOSABS 0.2 05/04/2022 11:57 AM    BASOSABS 0.1 11/10/2022 07:55 AM    IMMGRAN 0 11/10/2022 07:55 AM    GRANULOCYTEABSOLUTECOUNT 0.0 05/04/2022 11:57 AM       Lab Results   Component Value Date/Time     11/10/2022 07:55 AM    K 3.8 11/10/2022 07:55 AM     11/10/2022 07:55 AM    CO2 30 11/10/2022 07:55 AM    ANIONGAP 5 11/10/2022 07:55 AM    GLUCOSE 105 11/10/2022 07:55 AM    BUN 14 11/10/2022 07:55 AM    CREATININE 0.80 11/10/2022 07:55 AM    GFRAA 96 10/28/2021 08:08 AM    LABGLOM >60 11/10/2022 07:55 AM    CALCIUM 9.6 11/10/2022 07:55 AM    BILITOT 0.5 11/10/2022 07:55 AM    ALT 21 11/10/2022 07:55 AM    AST 23 11/10/2022 07:55 AM    ALKPHOS 66 11/10/2022 07:55 AM    ALKPHOS 77 05/04/2022 11:57 AM    PROT 7.0 11/10/2022 07:55 AM    LABALBU 3.7 11/10/2022 07:55 AM    GLOB 3.3 11/10/2022 07:55 AM    ALBUMIN 1.1 11/10/2022 07:55 AM       Lab Results   Component Value Date/Time    CHOL 149 11/10/2022 07:55 AM    HDL 58 11/10/2022 07:55 AM    TRIG 76 11/10/2022 07:55 AM    LDLCALC 75.8 11/10/2022 07:55 AM    VLDL 14 05/04/2022 11:57 AM       Lab Results   Component Value Date/Time    LABA1C 5.8 12/01/2020 11:08 AM    LABA1C 5.4 05/01/2020 09:12 AM       Lab Results   Component Value Date/Time    TSH 2.450 05/04/2022 11:57 AM    TSH 2.140 01/07/2021 02:29 PM TSH 3.460 12/01/2020 11:08 AM       No results found for: PSA    Lab Results   Component Value Date/Time    SPECGRAV 1.012 05/04/2022 11:57 AM    PHUR 7.5 05/04/2022 11:57 AM    COLORU Yellow 05/04/2022 11:57 AM    CLARITYU Clear 05/04/2022 11:57 AM    LEUKOCYTESUR Negative 05/04/2022 11:57 AM    PROTEINU Negative 05/04/2022 11:57 AM    GLUCOSEU Negative 05/04/2022 11:57 AM    KETUA Negative 05/04/2022 11:57 AM    BLOODU Negative 05/04/2022 11:57 AM    BILIRUBINUR Negative 05/04/2022 11:57 AM    UROBILINOGEN 0.2 05/04/2022 11:57 AM    NITRU Negative 05/04/2022 11:57 AM    LABMICR Comment 05/04/2022 11:57 AM           Vitals:    11/17/22 1541   BP: 122/72   Site: Left Upper Arm   Position: Sitting   Cuff Size: Small Adult   Pulse: 55   Temp: 97 °F (36.1 °C)   TempSrc: Skin   SpO2: 100%   Weight: 142 lb (64.4 kg)   Height: 5' (1.524 m)     Wt Readings from Last 3 Encounters:   11/17/22 142 lb (64.4 kg)   08/18/22 144 lb 12.8 oz (65.7 kg)   11/04/21 134 lb (60.8 kg)     BP Readings from Last 3 Encounters:   11/17/22 122/72   08/18/22 (!) 140/80   05/11/22 120/64     Physical Exam  Vitals and nursing note reviewed. HENT:      Head: Normocephalic and atraumatic. Eyes:      Extraocular Movements: Extraocular movements intact. Conjunctiva/sclera: Conjunctivae normal.      Pupils: Pupils are equal, round, and reactive to light. Cardiovascular:      Rate and Rhythm: Normal rate and regular rhythm. Heart sounds: Normal heart sounds. Pulmonary:      Effort: Pulmonary effort is normal.      Breath sounds: Normal breath sounds. No wheezing or rhonchi. Musculoskeletal:      Right hand: Deformity present. No swelling. Decreased range of motion. Normal sensation. There is no disruption of two-point discrimination. Normal capillary refill. Normal pulse. Neurological:      General: No focal deficit present. Mental Status: She is alert. Mental status is at baseline.    Psychiatric:         Mood and Affect: Mood normal.         Behavior: Behavior normal.

## 2023-01-27 ENCOUNTER — TELEPHONE (OUTPATIENT)
Dept: CARDIOLOGY CLINIC | Age: 78
End: 2023-01-27

## 2023-01-27 NOTE — TELEPHONE ENCOUNTER
Pt was a previous pt of Dr. Leland Lees. Pt is experiencing BP of 177/89, no CP or SOB, and a little dizzy. Unknown HR.

## 2023-01-27 NOTE — TELEPHONE ENCOUNTER
BP has been elevated for about 10 days. Went to Urgent Care BP was 177/89. This am at home 133/89. BP at home 130-170 /80-90's. She is on Hyzaar 100/25mg qday. I had her check BP at home it is 138/88 and HR 67.

## 2023-01-27 NOTE — TELEPHONE ENCOUNTER
Mariama Angel routed conversation to [facility] Cardiology Triage 10 minutes ago (2:38 PM)     Mariama Angel 10 minutes ago (2:38 PM)     KL  Pt was a previous pt of Dr. Bradford Ramsey. Pt is experiencing BP of 177/89, no CP or SOB, and a little dizzy. Unknown HR.           Note         Latrice Blade [phone]  Mariama Angel

## 2023-01-30 RX ORDER — AMLODIPINE BESYLATE 5 MG/1
5 TABLET ORAL DAILY
Qty: 30 TABLET | Refills: 3 | Status: SHIPPED | OUTPATIENT
Start: 2023-01-30

## 2023-01-30 NOTE — TELEPHONE ENCOUNTER
Stay on hyzaar, add norvsac 5, work on low salt diet. Bring in home BP machine and readings when she sees me in 2 weeks.    Thanks

## 2023-03-17 ENCOUNTER — OFFICE VISIT (OUTPATIENT)
Dept: CARDIOLOGY CLINIC | Age: 78
End: 2023-03-17
Payer: MEDICARE

## 2023-03-17 VITALS
HEIGHT: 60 IN | HEART RATE: 68 BPM | DIASTOLIC BLOOD PRESSURE: 86 MMHG | WEIGHT: 144 LBS | SYSTOLIC BLOOD PRESSURE: 144 MMHG | BODY MASS INDEX: 28.27 KG/M2

## 2023-03-17 DIAGNOSIS — Z78.9 STATIN INTOLERANCE: ICD-10-CM

## 2023-03-17 DIAGNOSIS — I48.0 PAROXYSMAL ATRIAL FIBRILLATION (HCC): Primary | ICD-10-CM

## 2023-03-17 DIAGNOSIS — I70.0 AORTIC ATHEROSCLEROSIS (HCC): ICD-10-CM

## 2023-03-17 DIAGNOSIS — I71.21 ANEURYSM OF ASCENDING AORTA WITHOUT RUPTURE: ICD-10-CM

## 2023-03-17 DIAGNOSIS — I10 PRIMARY HYPERTENSION: ICD-10-CM

## 2023-03-17 PROCEDURE — G8428 CUR MEDS NOT DOCUMENT: HCPCS | Performed by: INTERNAL MEDICINE

## 2023-03-17 PROCEDURE — G8417 CALC BMI ABV UP PARAM F/U: HCPCS | Performed by: INTERNAL MEDICINE

## 2023-03-17 PROCEDURE — 1090F PRES/ABSN URINE INCON ASSESS: CPT | Performed by: INTERNAL MEDICINE

## 2023-03-17 PROCEDURE — 93000 ELECTROCARDIOGRAM COMPLETE: CPT | Performed by: INTERNAL MEDICINE

## 2023-03-17 PROCEDURE — 1123F ACP DISCUSS/DSCN MKR DOCD: CPT | Performed by: INTERNAL MEDICINE

## 2023-03-17 PROCEDURE — G8484 FLU IMMUNIZE NO ADMIN: HCPCS | Performed by: INTERNAL MEDICINE

## 2023-03-17 PROCEDURE — 3079F DIAST BP 80-89 MM HG: CPT | Performed by: INTERNAL MEDICINE

## 2023-03-17 PROCEDURE — 99214 OFFICE O/P EST MOD 30 MIN: CPT | Performed by: INTERNAL MEDICINE

## 2023-03-17 PROCEDURE — 3077F SYST BP >= 140 MM HG: CPT | Performed by: INTERNAL MEDICINE

## 2023-03-17 PROCEDURE — 1036F TOBACCO NON-USER: CPT | Performed by: INTERNAL MEDICINE

## 2023-03-17 PROCEDURE — G8400 PT W/DXA NO RESULTS DOC: HCPCS | Performed by: INTERNAL MEDICINE

## 2023-03-17 NOTE — PROGRESS NOTES
[address], [address] [facility], [address] [place] [address]  PHONE: [phone]     23    NAME:  Swathi Sharif  : 1945  MRN: [id number]       SUBJECTIVE:   Swathi Sharif is a 65 y.o. female seen for a follow up visit regarding the following:     Chief Complaint   Patient presents with    Atrial Fibrillation       HPI: Here for PAF, Aneurysm. Echo : AO 4.1cm, normal EF. Mod AI and MR. CTA 2021:   Stable to slightly enlarged ascending tubular thoracic aorta now measuring   4.2 cm. Feeling well on meds. Doing well on anticoagulation treatment as reviewed today, no bleeding issues or excessive bruising noted. NO angina, CP, OG, SOB. No palp. Off norvasc now, BP back down. Feeling well on meds. Past Medical History, Past Surgical History, Family history, Social History, and Medications were all reviewed with the patient today and updated as necessary. Current Outpatient Medications   Medication Sig Dispense Refill    Multiple Vitamin (MULTIVITAMIN ADULT PO) Take by mouth      CALCIUM-MAGNESIUM PO Take by mouth      ezetimibe (ZETIA) 10 MG tablet Take 1 tablet by mouth daily 90 tablet 3    losartan-hydroCHLOROthiazide (HYZAAR) 100-25 MG per tablet Take 1 tablet by mouth daily 90 tablet 3    rivaroxaban (XARELTO) 20 MG TABS tablet Take 1 tablet by mouth daily 90 tablet 3    ascorbic acid (VITAMIN C) 1000 MG tablet Take by mouth      esomeprazole (NEXIUM) 40 MG delayed release capsule Take 40 mg by mouth daily as needed       No current facility-administered medications for this visit. Allergies   Allergen Reactions    Atorvastatin Other (See Comments)     Patient states had a bad reaction to this medicine. But cant remember.      Patient Active Problem List    Diagnosis Date Noted    Thoracic ascending aortic aneurysm     Aortic atherosclerosis ([facility]     Stage 1 mild COPD by GOLD classification ([facility]     Hypertension     Aneurysm, carotid artery, internal 2020 Hyperlipemia     GERD (gastroesophageal reflux disease)     Paroxysmal atrial fibrillation ([facility] 2020    History of stroke 2020    Statin intolerance 2020    Low back pain 2020    Osteoarthritis of basilar joint of thumb     Osteopenia       Past Surgical History:   Procedure Laterality Date    CARPAL TUNNEL RELEASE Bilateral , 2005    CATARACT REMOVAL Bilateral 07/10/2019    TONSILLECTOMY      TOTAL HIP ARTHROPLASTY Right 2019    TUBAL LIGATION       Family History   Problem Relation Age of Onset    COPD Sister     Stroke Mother     Lung Disease Father         collapsed lung; tobacco abuse     Heart Attack Father     Thyroid Cancer Mother     Diabetes Sister     Cancer Mother         cervical    Diabetes Mother     Thyroid Disease Niece         treated with medication    Cancer Brother         liver    Diabetes Brother      Social History     Tobacco Use    Smoking status: Former     Packs/day: 0.50     Types: Cigarettes     Quit date: 2005     Years since quittin.6    Smokeless tobacco: Never    Tobacco comments:     Quit smoking: 10-11 yrs ago quit   Substance Use Topics    Alcohol use: Yes     Alcohol/week: 1.0 standard drink         ROS:    No obvious pertinent positives on review of systems except for what was outlined in the HPI today.       PHYSICAL EXAM:     BP (!) 144/86   Pulse 68   Ht 5' (1.524 m)   Wt 144 lb (65.3 kg)   BMI 28.12 kg/m²    General/Constitutional:   Alert and oriented x 3, no acute distress  HEENT:   normocephalic, atraumatic, moist mucous membranes  Neck:   No JVD or carotid bruits bilaterally  Cardiovascular:   regular rate and rhythm, no murmur/rub/gallop appreciated  Pulmonary:   clear to auscultation bilaterally, no respiratory distress  Abdomen:   soft, non-tender, non-distended  Ext:   No sig LE edema bilaterally  Skin:  warm and dry, no obvious rashes seen  Neuro:   no obvious sensory or motor deficits  Psychiatric:   normal mood and affect    EKG Today and independently reviewed by me: sinus rhythm, normal intervals and non-specific ST/T wave changes. Lab Results   Component Value Date/Time     11/10/2022 07:55 AM    K 3.8 11/10/2022 07:55 AM     11/10/2022 07:55 AM    CO2 30 11/10/2022 07:55 AM    BUN 14 11/10/2022 07:55 AM    CREATININE 0.80 11/10/2022 07:55 AM    GLUCOSE 105 11/10/2022 07:55 AM    CALCIUM 9.6 11/10/2022 07:55 AM        Lab Results   Component Value Date    WBC 7.1 11/10/2022    HGB 13.3 11/10/2022    HCT 40.3 11/10/2022    .0 11/10/2022     11/10/2022       Lab Results   Component Value Date    TSH 2.450 05/04/2022       Lab Results   Component Value Date    LABA1C 5.8 12/01/2020     Lab Results   Component Value Date     12/01/2020       Lab Results   Component Value Date    CHOL 149 11/10/2022    CHOL 157 05/04/2022    CHOL 194 10/28/2021     Lab Results   Component Value Date    TRIG 76 11/10/2022    TRIG 70 05/04/2022    TRIG 81 10/28/2021     Lab Results   Component Value Date    HDL 58 11/10/2022    HDL 55 05/04/2022    HDL 54 10/28/2021     Lab Results   Component Value Date    LDLCALC 75.8 11/10/2022    LDLCALC 88 05/04/2022    LDLCALC 125 (H) 10/28/2021     Lab Results   Component Value Date    LABVLDL 15.2 11/10/2022    LABVLDL 22.8 12/01/2020    LABVLDL 25 05/01/2020    VLDL 14 05/04/2022    VLDL 15 10/28/2021     Lab Results   Component Value Date    CHOLHDLRATIO 2.6 11/10/2022    CHOLHDLRATIO 2.9 12/01/2020    CHOLHDLRATIO 2.7 12/23/2019           I have Independently reviewed prior care notes, any ER records available, cardiac testing, labs and results with the patient and before seeing the patient today. Also independently reviewed outside records when available. ASSESSMENT:    Jose Eduardo Dalton was seen today for atrial fibrillation.     Diagnoses and all orders for this visit:    Paroxysmal atrial fibrillation (HCC)  -     EKG 12 Lead  -     Transthoracic echocardiogram (TTE) complete with contrast, bubble, strain, and 3D PRN; Future    Aneurysm of ascending aorta without rupture  -     Transthoracic echocardiogram (TTE) complete with contrast, bubble, strain, and 3D PRN; Future    Primary hypertension    Aortic atherosclerosis (HCC)  -     Transthoracic echocardiogram (TTE) complete with contrast, bubble, strain, and 3D PRN; Future    Statin intolerance        PLAN:     PAF:  follow, on xarelto, in sinus today. Follow. AI/AO:  echo in 6 mos. HTN:  off norvasc, and on hyzaar. The patient has been instructed to call with any angina or equivalent as reviewed today. All questions were answered with the patient voicing complete understanding. I have reviewed their anticoagulation treatment, instructed patient to call with any bleeding issues such as melana or other. The patient will call with any issues related to their treatment. All questions were answered with the patient voicing understanding. Patient has been instructed and agrees to call our office with any issues or other concerns related to their cardiac condition(s) and/or complaint(s). Return in about 6 months (around 9/17/2023).        Rama Andino, DO  3/17/2023

## 2023-05-11 ENCOUNTER — HOSPITAL ENCOUNTER (OUTPATIENT)
Dept: LAB | Age: 78
Discharge: HOME | End: 2023-05-14

## 2023-05-11 DIAGNOSIS — E78.00 PURE HYPERCHOLESTEROLEMIA: ICD-10-CM

## 2023-05-11 DIAGNOSIS — R73.9 BLOOD GLUCOSE ELEVATED: ICD-10-CM

## 2023-05-11 DIAGNOSIS — I10 PRIMARY HYPERTENSION: ICD-10-CM

## 2023-05-11 LAB
ALT SERPL-CCNC: 21 U/L (ref 12–65)
ANION GAP SERPL CALC-SCNC: 7 MMOL/L (ref 2–11)
AST SERPL-CCNC: 23 U/L (ref 15–37)
BUN SERPL-MCNC: 12 MG/DL (ref 8–23)
CALCIUM SERPL-MCNC: 9.1 MG/DL (ref 8.3–10.4)
CHLORIDE SERPL-SCNC: 101 MMOL/L (ref 101–110)
CHOLEST SERPL-MCNC: 153 MG/DL
CO2 SERPL-SCNC: 30 MMOL/L (ref 21–32)
CREAT SERPL-MCNC: 0.8 MG/DL (ref 0.6–1)
ERYTHROCYTE [DISTWIDTH] IN BLOOD BY AUTOMATED COUNT: 12.8 % (ref 11.9–14.6)
GLUCOSE SERPL-MCNC: 99 MG/DL (ref 65–100)
HCT VFR BLD AUTO: 39.9 % (ref 35.8–46.3)
HDLC SERPL-MCNC: 56 MG/DL (ref 40–60)
HDLC SERPL: 2.7
HGB BLD-MCNC: 13.4 G/DL (ref 11.7–15.4)
LDLC SERPL CALC-MCNC: 84.4 MG/DL
MCH RBC QN AUTO: 32.7 PG (ref 26.1–32.9)
MCHC RBC AUTO-ENTMCNC: 33.6 G/DL (ref 31.4–35)
MCV RBC AUTO: 97.3 FL (ref 82–102)
NRBC # BLD: 0 K/UL (ref 0–0.2)
PLATELET # BLD AUTO: 250 K/UL (ref 150–450)
PMV BLD AUTO: 11.2 FL (ref 9.4–12.3)
POTASSIUM SERPL-SCNC: 3.7 MMOL/L (ref 3.5–5.1)
RBC # BLD AUTO: 4.1 M/UL (ref 4.05–5.2)
SODIUM SERPL-SCNC: 138 MMOL/L (ref 133–143)
TRIGL SERPL-MCNC: 63 MG/DL (ref 35–150)
VLDLC SERPL CALC-MCNC: 12.6 MG/DL (ref 6–23)
WBC # BLD AUTO: 6.8 K/UL (ref 4.3–11.1)

## 2023-05-12 LAB
EST. AVERAGE GLUCOSE BLD GHB EST-MCNC: 120 MG/DL
HBA1C MFR BLD: 5.8 % (ref 4.8–5.6)

## 2023-05-22 ENCOUNTER — TELEPHONE (OUTPATIENT)
Dept: ORTHOPEDIC SURGERY | Age: 78
End: 2023-05-22

## 2023-05-25 ENCOUNTER — OFFICE VISIT (OUTPATIENT)
Dept: INTERNAL MEDICINE CLINIC | Facility: CLINIC | Age: 78
End: 2023-05-25
Payer: MEDICARE

## 2023-05-25 VITALS
SYSTOLIC BLOOD PRESSURE: 130 MMHG | WEIGHT: 141.6 LBS | OXYGEN SATURATION: 95 % | HEART RATE: 57 BPM | TEMPERATURE: 98.4 F | DIASTOLIC BLOOD PRESSURE: 60 MMHG | HEIGHT: 60 IN | BODY MASS INDEX: 27.8 KG/M2

## 2023-05-25 DIAGNOSIS — E78.00 PURE HYPERCHOLESTEROLEMIA: ICD-10-CM

## 2023-05-25 DIAGNOSIS — M65.341 TRIGGER FINGER, RIGHT RING FINGER: ICD-10-CM

## 2023-05-25 DIAGNOSIS — Z91.81 AT HIGH RISK FOR FALLS: ICD-10-CM

## 2023-05-25 DIAGNOSIS — J44.9 CHRONIC OBSTRUCTIVE PULMONARY DISEASE, UNSPECIFIED COPD TYPE (HCC): ICD-10-CM

## 2023-05-25 DIAGNOSIS — Z00.00 MEDICARE ANNUAL WELLNESS VISIT, SUBSEQUENT: Primary | ICD-10-CM

## 2023-05-25 DIAGNOSIS — I10 PRIMARY HYPERTENSION: ICD-10-CM

## 2023-05-25 DIAGNOSIS — Z23 ENCOUNTER FOR IMMUNIZATION: ICD-10-CM

## 2023-05-25 DIAGNOSIS — I48.0 PAROXYSMAL ATRIAL FIBRILLATION (HCC): ICD-10-CM

## 2023-05-25 PROCEDURE — 3074F SYST BP LT 130 MM HG: CPT | Performed by: INTERNAL MEDICINE

## 2023-05-25 PROCEDURE — 3078F DIAST BP <80 MM HG: CPT | Performed by: INTERNAL MEDICINE

## 2023-05-25 PROCEDURE — G0439 PPPS, SUBSEQ VISIT: HCPCS | Performed by: INTERNAL MEDICINE

## 2023-05-25 PROCEDURE — 1090F PRES/ABSN URINE INCON ASSESS: CPT | Performed by: INTERNAL MEDICINE

## 2023-05-25 PROCEDURE — G8417 CALC BMI ABV UP PARAM F/U: HCPCS | Performed by: INTERNAL MEDICINE

## 2023-05-25 PROCEDURE — 1036F TOBACCO NON-USER: CPT | Performed by: INTERNAL MEDICINE

## 2023-05-25 PROCEDURE — G8399 PT W/DXA RESULTS DOCUMENT: HCPCS | Performed by: INTERNAL MEDICINE

## 2023-05-25 PROCEDURE — 90677 PCV20 VACCINE IM: CPT | Performed by: INTERNAL MEDICINE

## 2023-05-25 PROCEDURE — 99213 OFFICE O/P EST LOW 20 MIN: CPT | Performed by: INTERNAL MEDICINE

## 2023-05-25 PROCEDURE — 1123F ACP DISCUSS/DSCN MKR DOCD: CPT | Performed by: INTERNAL MEDICINE

## 2023-05-25 PROCEDURE — G0009 ADMIN PNEUMOCOCCAL VACCINE: HCPCS | Performed by: INTERNAL MEDICINE

## 2023-05-25 PROCEDURE — 3023F SPIROM DOC REV: CPT | Performed by: INTERNAL MEDICINE

## 2023-05-25 PROCEDURE — G8427 DOCREV CUR MEDS BY ELIG CLIN: HCPCS | Performed by: INTERNAL MEDICINE

## 2023-05-25 RX ORDER — LOSARTAN POTASSIUM 100 MG/1
TABLET ORAL
COMMUNITY
Start: 2021-03-02

## 2023-05-25 RX ORDER — EZETIMIBE 10 MG/1
10 TABLET ORAL DAILY
Qty: 90 TABLET | Refills: 3 | Status: SHIPPED | OUTPATIENT
Start: 2023-05-25

## 2023-05-25 RX ORDER — LOSARTAN POTASSIUM AND HYDROCHLOROTHIAZIDE 25; 100 MG/1; MG/1
1 TABLET ORAL DAILY
Qty: 90 TABLET | Refills: 3 | Status: SHIPPED | OUTPATIENT
Start: 2023-05-25

## 2023-05-25 SDOH — ECONOMIC STABILITY: INCOME INSECURITY: HOW HARD IS IT FOR YOU TO PAY FOR THE VERY BASICS LIKE FOOD, HOUSING, MEDICAL CARE, AND HEATING?: NOT HARD AT ALL

## 2023-05-25 SDOH — ECONOMIC STABILITY: FOOD INSECURITY: WITHIN THE PAST 12 MONTHS, YOU WORRIED THAT YOUR FOOD WOULD RUN OUT BEFORE YOU GOT MONEY TO BUY MORE.: NEVER TRUE

## 2023-05-25 SDOH — ECONOMIC STABILITY: FOOD INSECURITY: WITHIN THE PAST 12 MONTHS, THE FOOD YOU BOUGHT JUST DIDN'T LAST AND YOU DIDN'T HAVE MONEY TO GET MORE.: NEVER TRUE

## 2023-05-25 SDOH — ECONOMIC STABILITY: HOUSING INSECURITY
IN THE LAST 12 MONTHS, WAS THERE A TIME WHEN YOU DID NOT HAVE A STEADY PLACE TO SLEEP OR SLEPT IN A SHELTER (INCLUDING NOW)?: NO

## 2023-05-25 ASSESSMENT — PATIENT HEALTH QUESTIONNAIRE - PHQ9
SUM OF ALL RESPONSES TO PHQ QUESTIONS 1-9: 0
SUM OF ALL RESPONSES TO PHQ QUESTIONS 1-9: 0
SUM OF ALL RESPONSES TO PHQ9 QUESTIONS 1 & 2: 0
SUM OF ALL RESPONSES TO PHQ QUESTIONS 1-9: 0
2. FEELING DOWN, DEPRESSED OR HOPELESS: 0
SUM OF ALL RESPONSES TO PHQ QUESTIONS 1-9: 0
1. LITTLE INTEREST OR PLEASURE IN DOING THINGS: 0

## 2023-05-31 ENCOUNTER — OFFICE VISIT (OUTPATIENT)
Dept: ORTHOPEDIC SURGERY | Age: 78
End: 2023-05-31
Payer: MEDICARE

## 2023-05-31 DIAGNOSIS — S70.01XA CONTUSION OF RIGHT HIP, INITIAL ENCOUNTER: ICD-10-CM

## 2023-05-31 DIAGNOSIS — Z96.641 STATUS POST RIGHT HIP REPLACEMENT: Primary | ICD-10-CM

## 2023-05-31 DIAGNOSIS — M25.551 RIGHT HIP PAIN: ICD-10-CM

## 2023-05-31 PROCEDURE — G8417 CALC BMI ABV UP PARAM F/U: HCPCS | Performed by: PHYSICIAN ASSISTANT

## 2023-05-31 PROCEDURE — 1123F ACP DISCUSS/DSCN MKR DOCD: CPT | Performed by: PHYSICIAN ASSISTANT

## 2023-05-31 PROCEDURE — 1090F PRES/ABSN URINE INCON ASSESS: CPT | Performed by: PHYSICIAN ASSISTANT

## 2023-05-31 PROCEDURE — G8399 PT W/DXA RESULTS DOCUMENT: HCPCS | Performed by: PHYSICIAN ASSISTANT

## 2023-05-31 PROCEDURE — G8428 CUR MEDS NOT DOCUMENT: HCPCS | Performed by: PHYSICIAN ASSISTANT

## 2023-05-31 PROCEDURE — 99213 OFFICE O/P EST LOW 20 MIN: CPT | Performed by: PHYSICIAN ASSISTANT

## 2023-05-31 NOTE — PROGRESS NOTES
Name: Phani Barnhart  YOB: 1945  Gender: female  MRN: [id number]    CC:   Chief Complaint   Patient presents with    Hip Pain     Right-Fall          HPI:   The right hip pain has been present for 3 weeks, s/p fall at home, and is improving. The pain is located over the side of right hip with some associated swelling. It does not hurt to walk at this point. The pain does not radiate down the leg. Numbness and tingling are not noted. The patient is not having difficulty putting socks and shoes on. Treatment has been Tylenol, cold therapy,and rest.    Patient is unable to take NSAIDs due to being on Xarelto for a-fib. H/o right RAJAT in 2019 which had been doing well. Review of Systems  As per HPI. Pertinent positives and negatives are addressed with the patient, particularly those related to musculoskeletal concerns. Non-orthopaedic concerns were referred back to the primary care physician. [address]:    Current Outpatient Medications:     ezetimibe (ZETIA) 10 MG tablet, Take 1 tablet by mouth daily, Disp: 90 tablet, Rfl: 3    losartan-hydroCHLOROthiazide (HYZAAR) 100-25 MG per tablet, Take 1 tablet by mouth daily, Disp: 90 tablet, Rfl: 3    rivaroxaban (XARELTO) 20 MG TABS tablet, Take 1 tablet by mouth daily, Disp: 90 tablet, Rfl: 3    hydrocortisone 2.5 % ointment, APPLY TOPICALLY TO THE AFFECTED AREA THREE TIMES DAILY FOR 14 DAYS, Disp: , Rfl:     losartan (COZAAR) 100 MG tablet,  Instructions: TAKE 1 TABLET BY MOUTH DAILY, Disp: , Rfl:     Multiple Vitamin (MULTIVITAMIN ADULT PO), Take by mouth, Disp: , Rfl:     CALCIUM-MAGNESIUM PO, Take by mouth, Disp: , Rfl:     ascorbic acid (VITAMIN C) 1000 MG tablet, Take by mouth, Disp: , Rfl:     esomeprazole (NEXIUM) 40 MG delayed release capsule, Take 1 capsule by mouth daily as needed, Disp: , Rfl:   Allergies   Allergen Reactions    Atorvastatin Other (See Comments)     Patient states had a bad reaction to this medicine.  But cant

## 2023-07-28 NOTE — PROGRESS NOTES
Problem: Mobility Impaired (Adult and Pediatric)  Goal: *Acute Goals and Plan of Care (Insert Text)  Description  GOALS (1-4 days):  (1.)Ms. Ira Dale will move from supine to sit and sit to supine  in bed with SUPERVISION. (2.)Ms. Ira Dale will transfer from bed to chair and chair to bed with SUPERVISION using the least restrictive device. (3.)Ms. Ira Dale will ambulate with SUPERVISION for 250 feet with the least restrictive device. (4.)Ms. Ira Dale will ambulate up/down 2 steps with appropriate railing with MINIMAL ASSIST with no device. (5.)Ms. Ira Dale will state/observe MCKENZIE precautions with 0 verbal cues. ________________________________________________________________________________________________     Outcome: Progressing Towards Goal     PHYSICAL THERAPY JOINT CAMP MCKENZIE: Daily Note and PM 8/2/2019  INPATIENT: Hospital Day: 2  Payor: Nikky Carr / Plan: [address] [place] [address] HMO / Product Type: Managed Care Medicare /      NAME/AGE/GENDER: Fransico Schultz is a 75 y.o. female   PRIMARY DIAGNOSIS:  Unilateral primary osteoarthritis, right hip [M16.11]   Procedure(s) and Anesthesia Type:     * RIGHT TOTAL HIP ARTHOPLASTY  - Spinal (Right)  ICD-10: Treatment Diagnosis:    · Pain in Right Hip (M25.551)  · Stiffness of Right Hip, Not elsewhere classified (M25.651)  · Difficulty in walking, Not elsewhere classified (R26.2)      ASSESSMENT:     Ms. Ira Dale presents s/p R MCKENZIE. Patient demonstrates decreased R LE strength and ROM and decreased independence with mobility. Patient is independent at baseline and would benefit from therapy to facilitate a return to prior level. Patient will return home at d/c. She lives alone but will have family/friends to assist her as needed. 8/2/19 AM:  Pt is sitting up in chair on arrival she is agreeable to PT and plans to go home today after group PT. She ambulated into the restroom to void. Exercises performed per chart below.      This section established at most recent assessment   PROBLEM LIST (Impairments causing functional limitations):  1. Decreased Strength  2. Decreased ADL/Functional Activities  3. Decreased Transfer Abilities  4. Decreased Ambulation Ability/Technique  5. Decreased Balance  6. Increased Pain  7. Decreased Flexibility/Joint Mobility  8. Decreased Knowledge of Precautions  9. Decreased [place] with Home Exercise Program   INTERVENTIONS PLANNED: (Benefits and precautions of physical therapy have been discussed with the patient.)  1. bed mobility  2. gait training  3. home exercise program (HEP)  4. Range of Motion: active/assisted/passive  5. Therapeutic Activities  6. therapeutic exercise/strengthening  7. transfer training  8. Group Therapy     TREATMENT PLAN: Frequency/Duration: Follow patient BID for duration of hospital stay to address above goals. Rehabilitation Potential For Stated Goals: Good     RECOMMENDED REHABILITATION/EQUIPMENT: (at time of discharge pending progress): Continue Skilled Therapy. HISTORY:   History of Present Injury/Illness (Reason for Referral):  R MCKENZIE  Past Medical History/Comorbidities:   Ms. Deb Haywood  has a past medical history of Aortic atherosclerosis ([address] Arthritis, Ascending aortic aneurysm ([address] Chronic airway obstruction, not elsewhere classified, GERD (gastroesophageal reflux disease), Hiatal hernia, Hyperlipemia, Hypertension, Osteopenia, and Vitamin D deficiency. Ms. Deb Haywood  has a past surgical history that includes hx carpal tunnel release (Bilateral, 2004, 2005); hx tonsillectomy; hx cataract removal (07/10/2019); and hx tubal ligation.   Social History/Living Environment:   Home Environment: Private residence  # Steps to Enter: 2  One/Two Story Residence: Two story, live on 1st floor  # of Interior Steps: 14  Living Alone: Yes  Support Systems: Family member(s), Friends \ neighbors  Patient Expects to be Discharged to[de-identified] Private residence  Current DME Used/Available at Home: Commode, bedside, Shower chair, Walker, rolling  Tub or Shower Type: Tub/Shower combination  Prior Level of Function/Work/Activity:  Independent   Number of Personal Factors/Comorbidities that affect the Plan of Care: 0: LOW COMPLEXITY   EXAMINATION:   Most Recent Physical Functioning:                            Bed Mobility  Supine to Sit: Stand-by assistance    Transfers  Sit to Stand: Stand-by assistance  Stand to Sit: Stand-by assistance  Bed to Chair: Stand-by assistance    Balance  Sitting: Intact  Standing: Intact; With support              Weight Bearing Status  Right Side Weight Bearing: As tolerated  Distance (ft): 274 Feet (ft)(x2)  Ambulation - Level of Assistance: Stand-by assistance  Assistive Device: Walker, rolling  Speed/Prabha: Delayed;Pace decreased (<100 feet/min)  Step Length: Left shortened;Right shortened  Stance: Right decreased  Gait Abnormalities: Antalgic;Decreased step clearance  Interventions: Safety awareness training;Verbal cues     Braces/Orthotics: none    Right Hip Cold  Type: Cold/ice packs      Body Structures Involved:  1. Joints  2. Muscles Body Functions Affected:  1. Movement Related Activities and Participation Affected:  1. Mobility   Number of elements that affect the Plan of Care: 4+: HIGH COMPLEXITY   CLINICAL PRESENTATION:   Presentation: Stable and uncomplicated: LOW COMPLEXITY   CLINICAL DECISION MAKIN [facility] [address] [address] AM-PAC 6 Clicks   Basic Mobility Inpatient Short Form  How much difficulty does the patient currently have. .. Unable A Lot A Little None   1. Turning over in bed (including adjusting bedclothes, sheets and blankets)? ? 1   ? 2   ? 3   ? 4   2. Sitting down on and standing up from a chair with arms ( e.g., wheelchair, bedside commode, etc.)   ? 1   ? 2   ? 3   ? 4   3. Moving from lying on back to sitting on the side of the bed?   ? 1   ? 2   ? 3   ? 4   How much help from another person does the patient currently need. .. Total A Lot A Little None   4.   Moving to and from a bed to a chair (including a wheelchair)? ? 1   ? 2   ? 3   ? 4   5. Need to walk in hospital room? ? 1   ? 2   ? 3   ? 4   6. Climbing 3-5 steps with a railing? ? 1   ? 2   ? 3   ? 4   © 2007, Trustees of [address] [place] [address] [address] [address], under license to [employer]. All rights reserved     Score:  Initial: 18 Most Recent: X (Date: -- )    Interpretation of Tool:  Represents activities that are increasingly more difficult (i.e. Bed mobility, Transfers, Gait). Medical Necessity:     · Patient is expected to demonstrate progress in strength, range of motion, balance and coordination  ·  to increase independence with mobility and HEP. · .  Reason for Services/Other Comments:  · Patient continues to require skilled intervention due to decreased strength and coordination and decreased independence with mobility s/p MCKENZIE. · .   Use of outcome tool(s) and clinical judgement create a POC that gives a: Clear prediction of patient's progress: LOW COMPLEXITY            TREATMENT:   (In addition to Assessment/Re-Assessment sessions the following treatments were rendered)     Pre-treatment Symptoms/Complaints:  Minimal complaints of pain. Pain: Initial:      Post Session:  Not rated     Therapeutic Exercise: (45 Minutes(group)):  Exercises per grid below to improve mobility and strength. Required minimal verbal cues to promote proper body alignment. Progressed repetitions as indicated. Gait Training (15 Minutes):  Gait training to improve and/or restore physical functioning as related to mobility, strength and balance. Ambulated 274 Feet (ft)(x2) with Stand-by assistance using a Walker, rolling and minimal Safety awareness training;Verbal cues related to their stance phase and stride length to promote proper body alignment, promote proper body posture and promote proper body mechanics. Date:  8/1/19 Date:  8/2/19 Date:     ACTIVITY/EXERCISE AM PM AM PM AM PM   GROUP THERAPY  ? ?  ?  x  ?  ?    Ankle Pumps 10 10 15     Quad Sets  10 10 15     Gluteal Sets  10 10 15     Hip ABd/ADduction  10 10 15     Straight Leg Raises         Knee Slides  10 10 15     Short Arc Quads  10  15     Long Arc Quads   10 15     Chair Slides                  B = bilateral; AA = active assistive; A = active; P = passive      Treatment/Session Assessment:     Response to Treatment:  Pt continues to do well. Education:  ? Home Exercises  ? Fall Precautions  ? Hip Precautions ? D/C Instruction Review  ? Knee/Hip Prosthesis Review  ? Walker Management/Safety ? Adaptive Equipment as Needed       Interdisciplinary Collaboration:   o Physical Therapy Assistant  o Registered Nurse    After treatment position/precautions:   o Up in chair  o Bed/Chair-wheels locked  o Call light within reach  o Family at bedside    Compliance with Program/Exercises: Will assess as treatment progresses. Recommendations/Intent for next treatment session:  Treatment next visit will focus on increasing Ms. Nails's independence with bed mobility, transfers, gait training, strength/ROM exercises, modalities for pain, and patient education.       Total Treatment Duration:  PT Patient Time In/Time Out  Time In: 1300  Time Out: [address], PTA Vacuum Extraction was not used

## 2023-08-07 ENCOUNTER — APPOINTMENT (RX ONLY)
Dept: URBAN - METROPOLITAN AREA CLINIC 23 | Facility: CLINIC | Age: 78
Setting detail: DERMATOLOGY
End: 2023-08-07

## 2023-08-07 DIAGNOSIS — D18.0 HEMANGIOMA: ICD-10-CM

## 2023-08-07 DIAGNOSIS — L81.4 OTHER MELANIN HYPERPIGMENTATION: ICD-10-CM

## 2023-08-07 DIAGNOSIS — L57.8 OTHER SKIN CHANGES DUE TO CHRONIC EXPOSURE TO NONIONIZING RADIATION: ICD-10-CM

## 2023-08-07 DIAGNOSIS — L82.1 OTHER SEBORRHEIC KERATOSIS: ICD-10-CM

## 2023-08-07 DIAGNOSIS — D22 MELANOCYTIC NEVI: ICD-10-CM

## 2023-08-07 PROBLEM — D22.72 MELANOCYTIC NEVI OF LEFT LOWER LIMB, INCLUDING HIP: Status: ACTIVE | Noted: 2023-08-07

## 2023-08-07 PROBLEM — D22.5 MELANOCYTIC NEVI OF TRUNK: Status: ACTIVE | Noted: 2023-08-07

## 2023-08-07 PROBLEM — D18.01 HEMANGIOMA OF SKIN AND SUBCUTANEOUS TISSUE: Status: ACTIVE | Noted: 2023-08-07

## 2023-08-07 PROCEDURE — 99213 OFFICE O/P EST LOW 20 MIN: CPT

## 2023-08-07 PROCEDURE — ? COUNSELING

## 2023-08-07 ASSESSMENT — LOCATION DETAILED DESCRIPTION DERM
LOCATION DETAILED: LEFT ANTERIOR DISTAL THIGH
LOCATION DETAILED: MIDDLE STERNUM
LOCATION DETAILED: RIGHT VENTRAL PROXIMAL FOREARM
LOCATION DETAILED: LEFT LATERAL SUPERIOR CHEST
LOCATION DETAILED: RIGHT LATERAL SUPERIOR CHEST
LOCATION DETAILED: LEFT POSTERIOR SHOULDER
LOCATION DETAILED: RIGHT ANTERIOR DISTAL THIGH
LOCATION DETAILED: LEFT SUPERIOR UPPER BACK
LOCATION DETAILED: RIGHT MEDIAL SUPERIOR CHEST
LOCATION DETAILED: LEFT VENTRAL DISTAL FOREARM
LOCATION DETAILED: RIGHT SUPERIOR UPPER BACK
LOCATION DETAILED: INFERIOR THORACIC SPINE
LOCATION DETAILED: LEFT ANTERIOR PROXIMAL THIGH
LOCATION DETAILED: RIGHT POSTERIOR SHOULDER
LOCATION DETAILED: RIGHT PROXIMAL DORSAL FOREARM
LOCATION DETAILED: LEFT SUPERIOR LATERAL MALAR CHEEK
LOCATION DETAILED: LEFT SUPERIOR LATERAL MIDBACK
LOCATION DETAILED: RIGHT MEDIAL UPPER BACK
LOCATION DETAILED: RIGHT INFERIOR UPPER BACK
LOCATION DETAILED: LEFT PROXIMAL DORSAL FOREARM

## 2023-08-07 ASSESSMENT — LOCATION ZONE DERM
LOCATION ZONE: LEG
LOCATION ZONE: ARM
LOCATION ZONE: FACE
LOCATION ZONE: TRUNK

## 2023-08-07 ASSESSMENT — LOCATION SIMPLE DESCRIPTION DERM
LOCATION SIMPLE: RIGHT THIGH
LOCATION SIMPLE: CHEST
LOCATION SIMPLE: LEFT UPPER BACK
LOCATION SIMPLE: LEFT SHOULDER
LOCATION SIMPLE: RIGHT FOREARM
LOCATION SIMPLE: LEFT THIGH
LOCATION SIMPLE: UPPER BACK
LOCATION SIMPLE: LEFT CHEEK
LOCATION SIMPLE: LEFT FOREARM
LOCATION SIMPLE: RIGHT UPPER BACK
LOCATION SIMPLE: RIGHT SHOULDER
LOCATION SIMPLE: LEFT LOWER BACK

## 2023-09-18 ENCOUNTER — OFFICE VISIT (OUTPATIENT)
Age: 78
End: 2023-09-18
Payer: MEDICARE

## 2023-09-18 VITALS
BODY MASS INDEX: 27.88 KG/M2 | DIASTOLIC BLOOD PRESSURE: 84 MMHG | SYSTOLIC BLOOD PRESSURE: 130 MMHG | WEIGHT: 142 LBS | HEART RATE: 62 BPM | HEIGHT: 60 IN

## 2023-09-18 DIAGNOSIS — I48.0 PAROXYSMAL ATRIAL FIBRILLATION (HCC): ICD-10-CM

## 2023-09-18 DIAGNOSIS — I70.0 AORTIC ATHEROSCLEROSIS (HCC): Primary | ICD-10-CM

## 2023-09-18 DIAGNOSIS — I10 PRIMARY HYPERTENSION: ICD-10-CM

## 2023-09-18 DIAGNOSIS — I71.21 ANEURYSM OF ASCENDING AORTA WITHOUT RUPTURE (HCC): ICD-10-CM

## 2023-09-18 PROCEDURE — 3075F SYST BP GE 130 - 139MM HG: CPT | Performed by: INTERNAL MEDICINE

## 2023-09-18 PROCEDURE — 99214 OFFICE O/P EST MOD 30 MIN: CPT | Performed by: INTERNAL MEDICINE

## 2023-09-18 PROCEDURE — G8428 CUR MEDS NOT DOCUMENT: HCPCS | Performed by: INTERNAL MEDICINE

## 2023-09-18 PROCEDURE — 1036F TOBACCO NON-USER: CPT | Performed by: INTERNAL MEDICINE

## 2023-09-18 PROCEDURE — 3079F DIAST BP 80-89 MM HG: CPT | Performed by: INTERNAL MEDICINE

## 2023-09-18 PROCEDURE — 1090F PRES/ABSN URINE INCON ASSESS: CPT | Performed by: INTERNAL MEDICINE

## 2023-09-18 PROCEDURE — G8417 CALC BMI ABV UP PARAM F/U: HCPCS | Performed by: INTERNAL MEDICINE

## 2023-09-18 PROCEDURE — 1123F ACP DISCUSS/DSCN MKR DOCD: CPT | Performed by: INTERNAL MEDICINE

## 2023-09-18 PROCEDURE — G8399 PT W/DXA RESULTS DOCUMENT: HCPCS | Performed by: INTERNAL MEDICINE

## 2023-09-18 NOTE — PROGRESS NOTES
[address] [facility], [address]  PHONE: [phone]     23    NAME:  Carlyn Santiago  : 1945  MRN: [id number]       SUBJECTIVE:   Carlyn Santiago is a 65 y.o. female seen for a follow up visit regarding the following:     Chief Complaint   Patient presents with    Atrial Fibrillation     ECHO        HPI: Here for PAF, Aneurysm. Echo : AO 4.1cm, normal EF. Mod AI and MR. CTA 2021:   Stable to slightly enlarged ascending tubular thoracic aorta now measuring   4.2 cm. Echo 2023: normal EF, mild to mod AI and MR.      moving to [facility]. No new angina. Doing well on anticoagulation treatment as reviewed today, no bleeding issues or excessive bruising noted. NO angina, CP, OG, SOB. No palp. Off norvasc now, BP back down. Feeling well on meds. Past Medical History, Past Surgical History, Family history, Social History, and Medications were all reviewed with the patient today and updated as necessary. Current Outpatient Medications   Medication Sig Dispense Refill    Famotidine (PEPCID AC PO) Take by mouth      ezetimibe (ZETIA) 10 MG tablet Take 1 tablet by mouth daily 90 tablet 3    losartan-hydroCHLOROthiazide (HYZAAR) 100-25 MG per tablet Take 1 tablet by mouth daily 90 tablet 3    rivaroxaban (XARELTO) 20 MG TABS tablet Take 1 tablet by mouth daily 90 tablet 3    Multiple Vitamin (MULTIVITAMIN ADULT PO) Take by mouth      CALCIUM-MAGNESIUM PO Take by mouth      ascorbic acid (VITAMIN C) 1000 MG tablet Take by mouth      esomeprazole (NEXIUM) 40 MG delayed release capsule Take 1 capsule by mouth daily as needed      hydrocortisone 2.5 % ointment APPLY TOPICALLY TO THE AFFECTED AREA THREE TIMES DAILY FOR 14 DAYS      losartan (COZAAR) 100 MG tablet   Instructions: TAKE 1 TABLET BY MOUTH DAILY       No current facility-administered medications for this visit.         Allergies   Allergen Reactions    Atorvastatin Other (See Comments)

## 2023-11-21 ENCOUNTER — HOSPITAL ENCOUNTER (OUTPATIENT)
Dept: LAB | Age: 78
Discharge: HOME | End: 2023-11-24

## 2023-11-21 DIAGNOSIS — J44.9 CHRONIC OBSTRUCTIVE PULMONARY DISEASE, UNSPECIFIED COPD TYPE (HCC): ICD-10-CM

## 2023-11-21 DIAGNOSIS — E78.00 PURE HYPERCHOLESTEROLEMIA: ICD-10-CM

## 2023-11-21 DIAGNOSIS — I10 PRIMARY HYPERTENSION: ICD-10-CM

## 2023-11-21 DIAGNOSIS — I48.0 PAROXYSMAL ATRIAL FIBRILLATION (HCC): ICD-10-CM

## 2023-11-21 DIAGNOSIS — M65.341 TRIGGER FINGER, RIGHT RING FINGER: ICD-10-CM

## 2023-11-21 LAB
ALT SERPL-CCNC: 21 U/L (ref 12–65)
ANION GAP SERPL CALC-SCNC: 6 MMOL/L (ref 2–11)
AST SERPL-CCNC: 17 U/L (ref 15–37)
BUN SERPL-MCNC: 11 MG/DL (ref 8–23)
CALCIUM SERPL-MCNC: 9.5 MG/DL (ref 8.3–10.4)
CHLORIDE SERPL-SCNC: 105 MMOL/L (ref 101–110)
CHOLEST SERPL-MCNC: 149 MG/DL
CO2 SERPL-SCNC: 31 MMOL/L (ref 21–32)
CREAT SERPL-MCNC: 0.8 MG/DL (ref 0.6–1)
GLUCOSE SERPL-MCNC: 101 MG/DL (ref 65–100)
HDLC SERPL-MCNC: 52 MG/DL (ref 40–60)
HDLC SERPL: 2.9
LDLC SERPL CALC-MCNC: 83 MG/DL
POTASSIUM SERPL-SCNC: 3.5 MMOL/L (ref 3.5–5.1)
SODIUM SERPL-SCNC: 142 MMOL/L (ref 133–143)
TRIGL SERPL-MCNC: 70 MG/DL (ref 35–150)
VLDLC SERPL CALC-MCNC: 14 MG/DL (ref 6–23)

## 2023-11-28 ASSESSMENT — PATIENT HEALTH QUESTIONNAIRE - PHQ9
2. FEELING DOWN, DEPRESSED OR HOPELESS: 0
SUM OF ALL RESPONSES TO PHQ9 QUESTIONS 1 & 2: 0
SUM OF ALL RESPONSES TO PHQ QUESTIONS 1-9: 0
2. FEELING DOWN, DEPRESSED OR HOPELESS: NOT AT ALL
SUM OF ALL RESPONSES TO PHQ9 QUESTIONS 1 & 2: 0
1. LITTLE INTEREST OR PLEASURE IN DOING THINGS: 0
1. LITTLE INTEREST OR PLEASURE IN DOING THINGS: NOT AT ALL
SUM OF ALL RESPONSES TO PHQ QUESTIONS 1-9: 0

## 2023-11-30 ENCOUNTER — OFFICE VISIT (OUTPATIENT)
Dept: INTERNAL MEDICINE CLINIC | Facility: CLINIC | Age: 78
End: 2023-11-30
Payer: MEDICARE

## 2023-11-30 VITALS
DIASTOLIC BLOOD PRESSURE: 74 MMHG | BODY MASS INDEX: 27.29 KG/M2 | HEART RATE: 66 BPM | WEIGHT: 139 LBS | TEMPERATURE: 97.2 F | HEIGHT: 60 IN | SYSTOLIC BLOOD PRESSURE: 138 MMHG | OXYGEN SATURATION: 97 %

## 2023-11-30 DIAGNOSIS — M18.11 PRIMARY OSTEOARTHRITIS OF FIRST CARPOMETACARPAL JOINT OF RIGHT HAND: ICD-10-CM

## 2023-11-30 DIAGNOSIS — I48.0 PAROXYSMAL ATRIAL FIBRILLATION (HCC): ICD-10-CM

## 2023-11-30 DIAGNOSIS — G89.29 CHRONIC BILATERAL LOW BACK PAIN WITHOUT SCIATICA: ICD-10-CM

## 2023-11-30 DIAGNOSIS — M54.50 CHRONIC BILATERAL LOW BACK PAIN WITHOUT SCIATICA: ICD-10-CM

## 2023-11-30 DIAGNOSIS — E78.00 PURE HYPERCHOLESTEROLEMIA: ICD-10-CM

## 2023-11-30 DIAGNOSIS — Z23 ENCOUNTER FOR IMMUNIZATION: Primary | ICD-10-CM

## 2023-11-30 DIAGNOSIS — I10 PRIMARY HYPERTENSION: ICD-10-CM

## 2023-11-30 PROCEDURE — 1123F ACP DISCUSS/DSCN MKR DOCD: CPT | Performed by: INTERNAL MEDICINE

## 2023-11-30 PROCEDURE — G8484 FLU IMMUNIZE NO ADMIN: HCPCS | Performed by: INTERNAL MEDICINE

## 2023-11-30 PROCEDURE — G8427 DOCREV CUR MEDS BY ELIG CLIN: HCPCS | Performed by: INTERNAL MEDICINE

## 2023-11-30 PROCEDURE — 99214 OFFICE O/P EST MOD 30 MIN: CPT | Performed by: INTERNAL MEDICINE

## 2023-11-30 PROCEDURE — G8399 PT W/DXA RESULTS DOCUMENT: HCPCS | Performed by: INTERNAL MEDICINE

## 2023-11-30 PROCEDURE — 3075F SYST BP GE 130 - 139MM HG: CPT | Performed by: INTERNAL MEDICINE

## 2023-11-30 PROCEDURE — 90694 VACC AIIV4 NO PRSRV 0.5ML IM: CPT | Performed by: INTERNAL MEDICINE

## 2023-11-30 PROCEDURE — 1036F TOBACCO NON-USER: CPT | Performed by: INTERNAL MEDICINE

## 2023-11-30 PROCEDURE — 3078F DIAST BP <80 MM HG: CPT | Performed by: INTERNAL MEDICINE

## 2023-11-30 PROCEDURE — G8417 CALC BMI ABV UP PARAM F/U: HCPCS | Performed by: INTERNAL MEDICINE

## 2023-11-30 PROCEDURE — G0008 ADMIN INFLUENZA VIRUS VAC: HCPCS | Performed by: INTERNAL MEDICINE

## 2023-11-30 PROCEDURE — 1090F PRES/ABSN URINE INCON ASSESS: CPT | Performed by: INTERNAL MEDICINE

## 2023-11-30 RX ORDER — LOSARTAN POTASSIUM AND HYDROCHLOROTHIAZIDE 25; 100 MG/1; MG/1
1 TABLET ORAL DAILY
Qty: 90 TABLET | Refills: 3 | Status: SHIPPED | OUTPATIENT
Start: 2023-11-30

## 2023-11-30 RX ORDER — EZETIMIBE 10 MG/1
10 TABLET ORAL DAILY
Qty: 90 TABLET | Refills: 3 | Status: SHIPPED | OUTPATIENT
Start: 2023-11-30

## 2023-11-30 ASSESSMENT — ENCOUNTER SYMPTOMS
SHORTNESS OF BREATH: 0
BACK PAIN: 1
CHEST TIGHTNESS: 0

## 2023-11-30 NOTE — PROGRESS NOTES
ASSESSMENT/PLAN:              Evaluation and management of the chronic condition(s) delineated. No negative side effects reported. I have reviewed all the lab results. There are some abnormalities that are either expected or not critical to the patient's health, and are discussed in the office today and are addressed. Please refer to the above assessement and plan narrative and orders and follow up plan. Medication discussed and refilled as needed. Physical exam findings are stable unless otherwise indicated and this is addressed. The most recent lab work and imaging and consultant/urgent care visits and imaging are reviewed and discussed and considered during this visit encounter. 1. Encounter for immunization  -     Influenza, FLUAD, (age 72 y+), IM, Preservative Free, 0.5 mL  -     Lipid Panel; Future  -     Urinalysis; Future  -     TSH; Future  -     Comprehensive Metabolic Panel; Future  -     CBC with Auto Differential; Future  2. Pure hypercholesterolemia  -     ezetimibe (ZETIA) 10 MG tablet; Take 1 tablet by mouth daily, Disp-90 tablet, R-3Normal  -     Lipid Panel; Future  -     Urinalysis; Future  -     TSH; Future  -     Comprehensive Metabolic Panel; Future  -     CBC with Auto Differential; Future  3. Primary hypertension  -     losartan-hydroCHLOROthiazide (HYZAAR) 100-25 MG per tablet; Take 1 tablet by mouth daily, Disp-90 tablet, R-3Normal  -     Lipid Panel; Future  -     Urinalysis; Future  -     TSH; Future  -     Comprehensive Metabolic Panel; Future  -     CBC with Auto Differential; Future  4. Paroxysmal atrial fibrillation (HCC)  -     rivaroxaban (XARELTO) 20 MG TABS tablet; Take 1 tablet by mouth daily, Disp-90 tablet, R-3Normal  -     Lipid Panel; Future  -     Urinalysis; Future  -     TSH; Future  -     Comprehensive Metabolic Panel; Future  -     CBC with Auto Differential; Future  5.  Primary osteoarthritis of first carpometacarpal joint of right hand  -     External Pt here for 3 month follow up. Pt had CT yesterday.

## 2024-01-09 ENCOUNTER — TELEPHONE (OUTPATIENT)
Age: 79
End: 2024-01-09

## 2024-01-15 ENCOUNTER — TELEPHONE (OUTPATIENT)
Dept: INTERNAL MEDICINE CLINIC | Facility: CLINIC | Age: 79
End: 2024-01-15

## 2024-01-15 NOTE — TELEPHONE ENCOUNTER
----- Message from Linda Cornejo sent at 1/10/2024  3:50 PM EST -----  Subject: Referral Request    Reason for referral request? rheumatology for arthritis in hands and back   Provider patient wants to be referred to(if known):     Provider Phone Number(if known):[phone]    Additional Information for Provider? patient states that this is an new   request due to previous request is not covered due to change of insurance   and require to have an new referral sent to [address] [place] [address]  [address] [place] [address] Fax: [phone]  ---------------------------------------------------------------------------  --------------  CALL BACK INFO    [phone]; OK to leave message on voicemail  ---------------------------------------------------------------------------  --------------

## 2024-01-15 NOTE — TELEPHONE ENCOUNTER
Left message for patient to contact office to inform us which Rheumatologist her insurance will cover.

## 2024-03-11 ENCOUNTER — TELEPHONE (OUTPATIENT)
Dept: INTERNAL MEDICINE CLINIC | Facility: CLINIC | Age: 79
End: 2024-03-11

## 2024-03-11 NOTE — TELEPHONE ENCOUNTER
Please contact pt to schedule first available to discuss need for referral for arthritic pain. Thank you.

## 2024-03-11 NOTE — TELEPHONE ENCOUNTER
----- Message from Paulina Nicole sent at 3/11/2024 11:19 AM EDT -----  Subject: Referral Request    Reason for referral request? Arthritis  Provider patient wants to be referred to(if known):     Provider Phone Number(if known):[phone]    Additional Information for Provider? Patient is calling because she would   like a referral to [facility], she states no specific   provider. Please advise.  ---------------------------------------------------------------------------  --------------  CALL BACK INFO    [phone]; OK to leave message on voicemail  ---------------------------------------------------------------------------  --------------

## 2024-03-18 ENCOUNTER — TELEPHONE (OUTPATIENT)
Dept: INTERNAL MEDICINE CLINIC | Facility: CLINIC | Age: 79
End: 2024-03-18

## 2024-03-18 DIAGNOSIS — G89.29 CHRONIC BILATERAL LOW BACK PAIN WITHOUT SCIATICA: ICD-10-CM

## 2024-03-18 DIAGNOSIS — M54.50 CHRONIC BILATERAL LOW BACK PAIN WITHOUT SCIATICA: ICD-10-CM

## 2024-03-18 DIAGNOSIS — M18.11 PRIMARY OSTEOARTHRITIS OF FIRST CARPOMETACARPAL JOINT OF RIGHT HAND: Primary | ICD-10-CM

## 2024-03-18 NOTE — TELEPHONE ENCOUNTER
Patient requesting her Rheumatology referral be resent to [facility]. New referral placed and signed.

## 2024-03-22 ENCOUNTER — OFFICE VISIT (OUTPATIENT)
Age: 79
End: 2024-03-22
Payer: COMMERCIAL

## 2024-03-22 VITALS
SYSTOLIC BLOOD PRESSURE: 150 MMHG | WEIGHT: 139.4 LBS | DIASTOLIC BLOOD PRESSURE: 80 MMHG | HEART RATE: 62 BPM | HEIGHT: 60 IN | BODY MASS INDEX: 27.37 KG/M2

## 2024-03-22 DIAGNOSIS — I71.21 ANEURYSM OF ASCENDING AORTA WITHOUT RUPTURE (HCC): ICD-10-CM

## 2024-03-22 DIAGNOSIS — I10 PRIMARY HYPERTENSION: ICD-10-CM

## 2024-03-22 DIAGNOSIS — I48.0 PAROXYSMAL ATRIAL FIBRILLATION (HCC): Primary | ICD-10-CM

## 2024-03-22 DIAGNOSIS — I70.0 AORTIC ATHEROSCLEROSIS (HCC): ICD-10-CM

## 2024-03-22 DIAGNOSIS — E78.00 PURE HYPERCHOLESTEROLEMIA: ICD-10-CM

## 2024-03-22 PROCEDURE — 3079F DIAST BP 80-89 MM HG: CPT | Performed by: INTERNAL MEDICINE

## 2024-03-22 PROCEDURE — 1123F ACP DISCUSS/DSCN MKR DOCD: CPT | Performed by: INTERNAL MEDICINE

## 2024-03-22 PROCEDURE — 3077F SYST BP >= 140 MM HG: CPT | Performed by: INTERNAL MEDICINE

## 2024-03-22 PROCEDURE — 93000 ELECTROCARDIOGRAM COMPLETE: CPT | Performed by: INTERNAL MEDICINE

## 2024-03-22 PROCEDURE — 99214 OFFICE O/P EST MOD 30 MIN: CPT | Performed by: INTERNAL MEDICINE

## 2024-03-22 NOTE — PROGRESS NOTES
[address] [facility], [place] [address]  PHONE: [phone]     24    NAME:  Magdalene Whittington  : 1945  MRN: [id number]       SUBJECTIVE:   Magdalene Whittington is a 78 y.o. female seen for a follow up visit regarding the following:     Chief Complaint   Patient presents with    Atrial Fibrillation       HPI: Here for PAF, Aneurysm.    Echo : AO 4.1cm, normal EF. Mod AI and MR.   CTA 2021:   Stable to slightly enlarged ascending tubular thoracic aorta now measuring   4.2 cm.   Echo 2023: normal EF, mild to mod AI and MR.      Moving to new [facility], more active now.  Busy, feeling well.   Getting over URI.   No new angina.  Doing well on anticoagulation treatment as reviewed today, no bleeding issues or excessive bruising noted.      NO angina, CP, OG, SOB.  No palp.            Past Medical History, Past Surgical History, Family history, Social History, and Medications were all reviewed with the patient today and updated as necessary.     Current Outpatient Medications   Medication Sig Dispense Refill    Potassium 99 MG TABS Take 2 tablets by mouth daily      rivaroxaban (XARELTO) 20 MG TABS tablet Take 1 tablet by mouth daily 90 tablet 3    ezetimibe (ZETIA) 10 MG tablet Take 1 tablet by mouth daily 90 tablet 3    losartan-hydroCHLOROthiazide (HYZAAR) 100-25 MG per tablet Take 1 tablet by mouth daily 90 tablet 3    Multiple Vitamin (MULTIVITAMIN ADULT PO) Take by mouth      CALCIUM-MAGNESIUM PO Take by mouth      ascorbic acid (VITAMIN C) 1000 MG tablet Take by mouth      esomeprazole (NEXIUM) 40 MG delayed release capsule Take 1 capsule by mouth daily as needed       No current facility-administered medications for this visit.        Allergies   Allergen Reactions    Atorvastatin Other (See Comments)     Patient states had a bad reaction to this medicine. But cant remember.     Patient Active Problem List    Diagnosis Date Noted    Thoracic ascending aortic aneurysm (HCC)

## 2024-04-16 DIAGNOSIS — I10 PRIMARY HYPERTENSION: ICD-10-CM

## 2024-04-16 DIAGNOSIS — E78.00 PURE HYPERCHOLESTEROLEMIA: ICD-10-CM

## 2024-04-16 RX ORDER — LOSARTAN POTASSIUM AND HYDROCHLOROTHIAZIDE 25; 100 MG/1; MG/1
1 TABLET ORAL DAILY
Qty: 90 TABLET | Refills: 3 | Status: SHIPPED | OUTPATIENT
Start: 2024-04-16

## 2024-04-16 RX ORDER — EZETIMIBE 10 MG/1
10 TABLET ORAL DAILY
Qty: 90 TABLET | Refills: 3 | Status: SHIPPED | OUTPATIENT
Start: 2024-04-16 | End: 2024-04-19 | Stop reason: SDUPTHER

## 2024-04-16 NOTE — TELEPHONE ENCOUNTER
Please send in medication refills at [facility] mail order- information in the chart  Thank you   deondre

## 2024-04-19 ENCOUNTER — TELEPHONE (OUTPATIENT)
Dept: INTERNAL MEDICINE CLINIC | Facility: CLINIC | Age: 79
End: 2024-04-19

## 2024-04-19 DIAGNOSIS — E78.00 PURE HYPERCHOLESTEROLEMIA: ICD-10-CM

## 2024-04-19 RX ORDER — EZETIMIBE 10 MG/1
10 TABLET ORAL DAILY
Qty: 90 TABLET | Refills: 1 | Status: SHIPPED | OUTPATIENT
Start: 2024-04-19

## 2024-04-19 NOTE — TELEPHONE ENCOUNTER
Prior Authorization was initiated through written form for Ezetimibe with [facility] PPO    Received an Approval letter for coverage from 2/1/2024- 4/19/2025    Patient notified of Approval

## 2024-04-19 NOTE — TELEPHONE ENCOUNTER
Isabela Ren, RN, BSN, IBCLC     Refills sent to [facility].

## 2024-04-19 NOTE — TELEPHONE ENCOUNTER
Patient states she would like a prescription for Ezetimibe sent to [facility] since she has had problems with mail order and she can get cheaper with [facility]

## 2024-05-14 ENCOUNTER — TELEPHONE (OUTPATIENT)
Age: 79
End: 2024-05-14

## 2024-05-14 NOTE — TELEPHONE ENCOUNTER
----- Message from Indu Lund MA sent at 5/14/2024 10:07 AM EDT -----  Regarding: FW: Blood thinner  Contact: [phone]    ----- Message -----  From: Jolly Crawford MA  Sent: 5/13/2024  11:50 AM EDT  To: Indu Lund MA  Subject: FW: Blood thinner                                  ----- Message -----  From: Magdalene Whittington  Sent: 5/13/2024  11:27 AM EDT  To: [facility] Clinical Staff  Subject: Blood thinner                                    Yes, for quite some time. Noticed one of the side effect of Xarelto is dizziness.

## 2024-05-14 NOTE — TELEPHONE ENCOUNTER
Pt.is calling reporting she has been dizzy for about 3 years.When it first started she contributed  to her stroke.She says a side effect of Xarelto is dizziness. However she c/o vertigo on Eliquis and even prior to being on either of these medicatios.I told pt.that she has long hx of dizziness on different meds dating back to 2020.I advised she contact her PCP about ongoing dizziness /vertigo and see what they advise.

## 2024-05-29 ENCOUNTER — NURSE ONLY (OUTPATIENT)
Dept: INTERNAL MEDICINE CLINIC | Facility: CLINIC | Age: 79
End: 2024-05-29

## 2024-05-29 DIAGNOSIS — E78.00 PURE HYPERCHOLESTEROLEMIA: ICD-10-CM

## 2024-05-29 DIAGNOSIS — Z23 ENCOUNTER FOR IMMUNIZATION: ICD-10-CM

## 2024-05-29 DIAGNOSIS — I48.0 PAROXYSMAL ATRIAL FIBRILLATION (HCC): ICD-10-CM

## 2024-05-29 DIAGNOSIS — I10 PRIMARY HYPERTENSION: ICD-10-CM

## 2024-05-30 LAB
ALBUMIN SERPL-MCNC: 4 G/DL (ref 3.2–4.6)
ALBUMIN/GLOB SERPL: 1.3 (ref 1–1.9)
ALP SERPL-CCNC: 78 U/L (ref 35–104)
ALT SERPL-CCNC: 16 U/L (ref 12–65)
AMORPH CRY URNS QL MICRO: ABNORMAL
ANION GAP SERPL CALC-SCNC: 10 MMOL/L (ref 9–18)
APPEARANCE UR: ABNORMAL
AST SERPL-CCNC: 31 U/L (ref 15–37)
BACTERIA URNS QL MICRO: ABNORMAL /HPF
BASOPHILS # BLD: 0.1 K/UL (ref 0–0.2)
BASOPHILS NFR BLD: 1 % (ref 0–2)
BILIRUB SERPL-MCNC: 0.5 MG/DL (ref 0–1.2)
BILIRUB UR QL: NEGATIVE
BUN SERPL-MCNC: 14 MG/DL (ref 8–23)
CALCIUM SERPL-MCNC: 9.6 MG/DL (ref 8.8–10.2)
CHLORIDE SERPL-SCNC: 102 MMOL/L (ref 98–107)
CHOLEST SERPL-MCNC: 167 MG/DL (ref 0–200)
CO2 SERPL-SCNC: 30 MMOL/L (ref 20–28)
COLOR UR: ABNORMAL
CREAT SERPL-MCNC: 0.71 MG/DL (ref 0.6–1.1)
DIFFERENTIAL METHOD BLD: ABNORMAL
EOSINOPHIL # BLD: 0.2 K/UL (ref 0–0.8)
EOSINOPHIL NFR BLD: 3 % (ref 0.5–7.8)
EPI CELLS #/AREA URNS HPF: ABNORMAL /HPF
ERYTHROCYTE [DISTWIDTH] IN BLOOD BY AUTOMATED COUNT: 13.2 % (ref 11.9–14.6)
GLOBULIN SER CALC-MCNC: 3.1 G/DL (ref 2.3–3.5)
GLUCOSE SERPL-MCNC: 99 MG/DL (ref 70–99)
GLUCOSE UR STRIP.AUTO-MCNC: NEGATIVE MG/DL
HCT VFR BLD AUTO: 36.8 % (ref 35.8–46.3)
HDLC SERPL-MCNC: 57 MG/DL (ref 40–60)
HDLC SERPL: 2.9 (ref 0–5)
HGB BLD-MCNC: 11.8 G/DL (ref 11.7–15.4)
HGB UR QL STRIP: ABNORMAL
IMM GRANULOCYTES # BLD AUTO: 0 K/UL (ref 0–0.5)
IMM GRANULOCYTES NFR BLD AUTO: 0 % (ref 0–5)
KETONES UR QL STRIP.AUTO: NEGATIVE MG/DL
LDLC SERPL CALC-MCNC: 88 MG/DL (ref 0–100)
LEUKOCYTE ESTERASE UR QL STRIP.AUTO: ABNORMAL
LYMPHOCYTES # BLD: 1.9 K/UL (ref 0.5–4.6)
LYMPHOCYTES NFR BLD: 26 % (ref 13–44)
MCH RBC QN AUTO: 31 PG (ref 26.1–32.9)
MCHC RBC AUTO-ENTMCNC: 32.1 G/DL (ref 31.4–35)
MCV RBC AUTO: 96.6 FL (ref 82–102)
MONOCYTES # BLD: 0.6 K/UL (ref 0.1–1.3)
MONOCYTES NFR BLD: 9 % (ref 4–12)
NEUTS SEG # BLD: 4.5 K/UL (ref 1.7–8.2)
NEUTS SEG NFR BLD: 61 % (ref 43–78)
NITRITE UR QL STRIP.AUTO: NEGATIVE
NRBC # BLD: 0 K/UL (ref 0–0.2)
OTHER OBSERVATIONS: ABNORMAL
PH UR STRIP: 7 (ref 5–9)
PLATELET # BLD AUTO: 276 K/UL (ref 150–450)
PMV BLD AUTO: 11.3 FL (ref 9.4–12.3)
POTASSIUM SERPL-SCNC: 3.9 MMOL/L (ref 3.5–5.1)
PROT SERPL-MCNC: 7.1 G/DL (ref 6.3–8.2)
PROT UR STRIP-MCNC: NEGATIVE MG/DL
RBC # BLD AUTO: 3.81 M/UL (ref 4.05–5.2)
RBC #/AREA URNS HPF: ABNORMAL /HPF
SODIUM SERPL-SCNC: 143 MMOL/L (ref 136–145)
SP GR UR REFRACTOMETRY: 1.01 (ref 1–1.02)
TRIGL SERPL-MCNC: 111 MG/DL (ref 0–150)
TSH, 3RD GENERATION: 3.4 UIU/ML (ref 0.27–4.2)
UROBILINOGEN UR QL STRIP.AUTO: 0.2 EU/DL (ref 0.2–1)
VLDLC SERPL CALC-MCNC: 22 MG/DL (ref 6–23)
WBC # BLD AUTO: 7.2 K/UL (ref 4.3–11.1)
WBC URNS QL MICRO: ABNORMAL /HPF

## 2024-06-02 SDOH — HEALTH STABILITY: PHYSICAL HEALTH: ON AVERAGE, HOW MANY DAYS PER WEEK DO YOU ENGAGE IN MODERATE TO STRENUOUS EXERCISE (LIKE A BRISK WALK)?: 3 DAYS

## 2024-06-02 SDOH — ECONOMIC STABILITY: FOOD INSECURITY: WITHIN THE PAST 12 MONTHS, YOU WORRIED THAT YOUR FOOD WOULD RUN OUT BEFORE YOU GOT MONEY TO BUY MORE.: NEVER TRUE

## 2024-06-02 SDOH — ECONOMIC STABILITY: FOOD INSECURITY: WITHIN THE PAST 12 MONTHS, THE FOOD YOU BOUGHT JUST DIDN'T LAST AND YOU DIDN'T HAVE MONEY TO GET MORE.: NEVER TRUE

## 2024-06-02 SDOH — HEALTH STABILITY: PHYSICAL HEALTH: ON AVERAGE, HOW MANY MINUTES DO YOU ENGAGE IN EXERCISE AT THIS LEVEL?: 20 MIN

## 2024-06-02 SDOH — ECONOMIC STABILITY: INCOME INSECURITY: HOW HARD IS IT FOR YOU TO PAY FOR THE VERY BASICS LIKE FOOD, HOUSING, MEDICAL CARE, AND HEATING?: NOT HARD AT ALL

## 2024-06-02 SDOH — ECONOMIC STABILITY: TRANSPORTATION INSECURITY
IN THE PAST 12 MONTHS, HAS LACK OF TRANSPORTATION KEPT YOU FROM MEETINGS, WORK, OR FROM GETTING THINGS NEEDED FOR DAILY LIVING?: NO

## 2024-06-02 ASSESSMENT — LIFESTYLE VARIABLES
HOW OFTEN DO YOU HAVE SIX OR MORE DRINKS ON ONE OCCASION: 1
HOW OFTEN DO YOU HAVE A DRINK CONTAINING ALCOHOL: 2-3 TIMES A WEEK
HOW OFTEN DO YOU HAVE A DRINK CONTAINING ALCOHOL: 4
HOW MANY STANDARD DRINKS CONTAINING ALCOHOL DO YOU HAVE ON A TYPICAL DAY: 1 OR 2
HOW MANY STANDARD DRINKS CONTAINING ALCOHOL DO YOU HAVE ON A TYPICAL DAY: 1

## 2024-06-02 ASSESSMENT — PATIENT HEALTH QUESTIONNAIRE - PHQ9
2. FEELING DOWN, DEPRESSED OR HOPELESS: NOT AT ALL
SUM OF ALL RESPONSES TO PHQ QUESTIONS 1-9: 0
SUM OF ALL RESPONSES TO PHQ QUESTIONS 1-9: 0
SUM OF ALL RESPONSES TO PHQ9 QUESTIONS 1 & 2: 0
SUM OF ALL RESPONSES TO PHQ QUESTIONS 1-9: 0
SUM OF ALL RESPONSES TO PHQ QUESTIONS 1-9: 0
1. LITTLE INTEREST OR PLEASURE IN DOING THINGS: NOT AT ALL

## 2024-06-05 ENCOUNTER — OFFICE VISIT (OUTPATIENT)
Dept: INTERNAL MEDICINE CLINIC | Facility: CLINIC | Age: 79
End: 2024-06-05
Payer: COMMERCIAL

## 2024-06-05 ENCOUNTER — HOSPITAL ENCOUNTER (OUTPATIENT)
Dept: GENERAL RADIOLOGY | Age: 79
Discharge: HOME | End: 2024-06-08
Payer: COMMERCIAL

## 2024-06-05 VITALS
DIASTOLIC BLOOD PRESSURE: 80 MMHG | HEIGHT: 60 IN | OXYGEN SATURATION: 97 % | SYSTOLIC BLOOD PRESSURE: 128 MMHG | BODY MASS INDEX: 28.47 KG/M2 | HEART RATE: 68 BPM | WEIGHT: 145 LBS | TEMPERATURE: 97.2 F

## 2024-06-05 DIAGNOSIS — I10 PRIMARY HYPERTENSION: ICD-10-CM

## 2024-06-05 DIAGNOSIS — J44.9 STAGE 1 MILD COPD BY GOLD CLASSIFICATION (HCC): ICD-10-CM

## 2024-06-05 DIAGNOSIS — R06.09 DOE (DYSPNEA ON EXERTION): ICD-10-CM

## 2024-06-05 DIAGNOSIS — Z00.00 MEDICARE ANNUAL WELLNESS VISIT, SUBSEQUENT: Primary | ICD-10-CM

## 2024-06-05 DIAGNOSIS — N39.0 URINARY TRACT INFECTION WITHOUT HEMATURIA, SITE UNSPECIFIED: ICD-10-CM

## 2024-06-05 DIAGNOSIS — M79.671 FOOT PAIN, BILATERAL: ICD-10-CM

## 2024-06-05 DIAGNOSIS — M79.672 FOOT PAIN, BILATERAL: ICD-10-CM

## 2024-06-05 PROBLEM — G72.0 STATIN MYOPATHY: Status: ACTIVE | Noted: 2020-04-29

## 2024-06-05 PROBLEM — T46.6X5A STATIN MYOPATHY: Status: ACTIVE | Noted: 2020-04-29

## 2024-06-05 LAB — NT PRO BNP: 320 PG/ML (ref 0–450)

## 2024-06-05 PROCEDURE — 1123F ACP DISCUSS/DSCN MKR DOCD: CPT | Performed by: INTERNAL MEDICINE

## 2024-06-05 PROCEDURE — 99214 OFFICE O/P EST MOD 30 MIN: CPT | Performed by: INTERNAL MEDICINE

## 2024-06-05 PROCEDURE — 3074F SYST BP LT 130 MM HG: CPT | Performed by: INTERNAL MEDICINE

## 2024-06-05 PROCEDURE — 3079F DIAST BP 80-89 MM HG: CPT | Performed by: INTERNAL MEDICINE

## 2024-06-05 PROCEDURE — G0439 PPPS, SUBSEQ VISIT: HCPCS | Performed by: INTERNAL MEDICINE

## 2024-06-05 PROCEDURE — 71046 X-RAY EXAM CHEST 2 VIEWS: CPT

## 2024-06-05 RX ORDER — GABAPENTIN 100 MG/1
100 CAPSULE ORAL NIGHTLY
Qty: 30 CAPSULE | Refills: 5 | Status: SHIPPED | OUTPATIENT
Start: 2024-06-05 | End: 2024-12-02

## 2024-06-05 RX ORDER — LOSARTAN POTASSIUM AND HYDROCHLOROTHIAZIDE 25; 100 MG/1; MG/1
1 TABLET ORAL DAILY
Qty: 90 TABLET | Refills: 3 | Status: SHIPPED | OUTPATIENT
Start: 2024-06-05

## 2024-06-05 RX ORDER — NITROFURANTOIN 25; 75 MG/1; MG/1
100 CAPSULE ORAL 2 TIMES DAILY
Qty: 14 CAPSULE | Refills: 0 | Status: SHIPPED | OUTPATIENT
Start: 2024-06-05 | End: 2024-06-12

## 2024-06-05 RX ORDER — NITROFURANTOIN 25; 75 MG/1; MG/1
100 CAPSULE ORAL 2 TIMES DAILY
Qty: 14 CAPSULE | Refills: 0 | Status: SHIPPED | OUTPATIENT
Start: 2024-06-05 | End: 2024-06-05

## 2024-06-05 NOTE — PROGRESS NOTES
Medicare Annual Wellness Visit    Magdalene Whittington is here for Medicare AWV (Lab review) and Foot Problem (Swelling and burning pain,bilateral)    Assessment & Plan     Chest x-ray is ordered to complete.    Brain natruretic peptide level to evaluate dyspnea on exertion and fatigue.    Medications refilled.    Possible urinary tract infection?  Treat with Macrobid 1 tablet twice daily for 7 days.    Gabapentin 100 mg at night for foot pain when sleeping numbness burning tingling and possibly neuropathy like symptoms?    On this date, 6/5/24, outside of the AWV, I have spent 30 minutes reviewing previous notes, test results and face to face with the patient discussing the diagnosis and importance of compliance with the treatment plan as well as documenting on the day of the visit.     An electronic signature was used to authenticate this note.  -- Federico Valentine MD   Medicare annual wellness visit, subsequent  Primary hypertension  -     losartan-hydroCHLOROthiazide (HYZAAR) 100-25 MG per tablet; Take 1 tablet by mouth daily, Disp-90 tablet, R-3Print  Urinary tract infection without hematuria, site unspecified  -     nitrofurantoin, macrocrystal-monohydrate, (MACROBID) 100 MG capsule; Take 1 capsule by mouth 2 times daily for 7 days, Disp-14 capsule, R-0Normal  Foot pain, bilateral  -     gabapentin (NEURONTIN) 100 MG capsule; Take 1 capsule by mouth nightly for 180 days. 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    Recommendations for Preventive Services Due: see orders and patient instructions/AVS.  Recommended screening schedule for the next 5-10 years is provided to the patient in written form: see Patient Instructions/AVS.     Return in 6 months (on 12/5/2024), or if symptoms worsen or fail to improve.

## 2024-06-05 NOTE — PATIENT INSTRUCTIONS
- 6/5/2024  Medicare offers a range of preventive health benefits. Some of the tests and screenings are paid in full while other may be subject to a deductible, co-insurance, and/or copay.    Some of these benefits include a comprehensive review of your medical history including lifestyle, illnesses that may run in your family, and various assessments and screenings as appropriate.    After reviewing your medical record and screening and assessments performed today your provider may have ordered immunizations, labs, imaging, and/or referrals for you.  A list of these orders (if applicable) as well as your Preventive Care list are included within your After Visit Summary for your review.    Other Preventive Recommendations:    A preventive eye exam performed by an eye specialist is recommended every 1-2 years to screen for glaucoma; cataracts, macular degeneration, and other eye disorders.  A preventive dental visit is recommended every 6 months.  Try to get at least 150 minutes of exercise per week or 10,000 steps per day on a pedometer .  Order or download the FREE \"Exercise & Physical Activity: Your Everyday Guide\" from [employer]. Call 1-817.471.3525 or search [employer] online.  You need 5517-1669 mg of calcium and 9355-7783 IU of vitamin D per day. It is possible to meet your calcium requirement with diet alone, but a vitamin D supplement is usually necessary to meet this goal.  When exposed to the sun, use a sunscreen that protects against both UVA and UVB radiation with an SPF of 30 or greater. Reapply every 2 to 3 hours or after sweating, drying off with a towel, or swimming.  Always wear a seat belt when traveling in a car. Always wear a helmet when riding a bicycle or motorcycle.

## 2024-07-25 ENCOUNTER — OFFICE VISIT (OUTPATIENT)
Dept: INTERNAL MEDICINE CLINIC | Facility: CLINIC | Age: 79
End: 2024-07-25
Payer: COMMERCIAL

## 2024-07-25 VITALS
WEIGHT: 146 LBS | HEIGHT: 60 IN | BODY MASS INDEX: 28.66 KG/M2 | OXYGEN SATURATION: 96 % | TEMPERATURE: 97.2 F | SYSTOLIC BLOOD PRESSURE: 126 MMHG | DIASTOLIC BLOOD PRESSURE: 70 MMHG | HEART RATE: 75 BPM

## 2024-07-25 DIAGNOSIS — I71.21 ANEURYSM OF ASCENDING AORTA WITHOUT RUPTURE (HCC): ICD-10-CM

## 2024-07-25 DIAGNOSIS — J44.9 STAGE 1 MILD COPD BY GOLD CLASSIFICATION (HCC): Primary | ICD-10-CM

## 2024-07-25 DIAGNOSIS — I48.0 PAROXYSMAL ATRIAL FIBRILLATION (HCC): ICD-10-CM

## 2024-07-25 DIAGNOSIS — I10 PRIMARY HYPERTENSION: ICD-10-CM

## 2024-07-25 PROCEDURE — G2211 COMPLEX E/M VISIT ADD ON: HCPCS | Performed by: INTERNAL MEDICINE

## 2024-07-25 PROCEDURE — 3078F DIAST BP <80 MM HG: CPT | Performed by: INTERNAL MEDICINE

## 2024-07-25 PROCEDURE — 3074F SYST BP LT 130 MM HG: CPT | Performed by: INTERNAL MEDICINE

## 2024-07-25 PROCEDURE — 1123F ACP DISCUSS/DSCN MKR DOCD: CPT | Performed by: INTERNAL MEDICINE

## 2024-07-25 PROCEDURE — 99213 OFFICE O/P EST LOW 20 MIN: CPT | Performed by: INTERNAL MEDICINE

## 2024-07-25 RX ORDER — ALBUTEROL SULFATE 90 UG/1
2 AEROSOL, METERED RESPIRATORY (INHALATION) 4 TIMES DAILY PRN
Qty: 18 G | Refills: 5 | Status: SHIPPED | OUTPATIENT
Start: 2024-07-25

## 2024-07-25 NOTE — PROGRESS NOTES
Chief Complaint   Patient presents with    Cough     Persistent, mentioned at last OV (6/5/2024) and sent for chest Xray       Magdalene Whittington 78 y.o. female     We had talked about her shortness of breath and ongoing mild cough and wheezing and had done a chest x-ray which was clear.  She has stage I at least, COPD.  She is becoming more symptomatic she thinks.  After further consideration she would like to try some treatment.  The problem is the cost of the medication available for COPD treatment it seems.  On exam her chest is clear but she does become shortness breath with walking after for example walking into the office from her car when she had to walk little further distance in normal limits become more of an issue.  She has a history of thoracic aortic aneurysm history of stroke hypertension and A-fib.  Her most expensive medication is Xarelto.  We discussed various options and treatments and even discussed seeing pulmonology as well.  I do not believe she is ever had any formal pulmonary function testing she is more in favor of just treatment at this time.  Treatment for symptomatic shortness of breath.  Due to cost we will try albuterol as needed and if symptoms or not alleviated or continue to worsen I will probably have to send her to pulmonology.    Blood Pressure 126/70 (Site: Left Upper Arm, Position: Sitting, Cuff Size: Small Adult)   Pulse 75   Temperature 97.2 °F (36.2 °C) (Temporal)   Height 1.524 m (5')   Weight 66.2 kg (146 lb)   Oxygen Saturation 96%   Body Mass Index 28.51 kg/m²   Physical Exam  Vitals and nursing note reviewed. Exam conducted with a chaperone present.   Constitutional:       General: She is not in acute distress.     Appearance: She is not toxic-appearing or diaphoretic.   Cardiovascular:      Rate and Rhythm: Normal rate and regular rhythm.      Heart sounds: Normal heart sounds. No murmur heard.  Pulmonary:      Effort: Pulmonary effort is normal.      Breath sounds:

## 2024-09-23 ENCOUNTER — OFFICE VISIT (OUTPATIENT)
Age: 79
End: 2024-09-23
Payer: COMMERCIAL

## 2024-09-23 VITALS
WEIGHT: 155 LBS | HEART RATE: 96 BPM | BODY MASS INDEX: 30.43 KG/M2 | DIASTOLIC BLOOD PRESSURE: 80 MMHG | HEIGHT: 60 IN | SYSTOLIC BLOOD PRESSURE: 134 MMHG

## 2024-09-23 DIAGNOSIS — I10 PRIMARY HYPERTENSION: ICD-10-CM

## 2024-09-23 DIAGNOSIS — Z86.73 HISTORY OF STROKE: ICD-10-CM

## 2024-09-23 DIAGNOSIS — I70.0 AORTIC ATHEROSCLEROSIS (HCC): ICD-10-CM

## 2024-09-23 DIAGNOSIS — I71.21 ANEURYSM OF ASCENDING AORTA WITHOUT RUPTURE (HCC): ICD-10-CM

## 2024-09-23 DIAGNOSIS — I48.0 PAROXYSMAL ATRIAL FIBRILLATION (HCC): Primary | ICD-10-CM

## 2024-09-23 PROCEDURE — 1123F ACP DISCUSS/DSCN MKR DOCD: CPT | Performed by: INTERNAL MEDICINE

## 2024-09-23 PROCEDURE — 99214 OFFICE O/P EST MOD 30 MIN: CPT | Performed by: INTERNAL MEDICINE

## 2024-09-23 PROCEDURE — 3079F DIAST BP 80-89 MM HG: CPT | Performed by: INTERNAL MEDICINE

## 2024-09-23 PROCEDURE — 3075F SYST BP GE 130 - 139MM HG: CPT | Performed by: INTERNAL MEDICINE

## 2024-09-23 RX ORDER — LISINOPRIL 20 MG/1
20 TABLET ORAL 2 TIMES DAILY
Qty: 180 TABLET | Refills: 1 | Status: SHIPPED | OUTPATIENT
Start: 2024-09-23

## 2024-10-03 ENCOUNTER — TELEPHONE (OUTPATIENT)
Age: 79
End: 2024-10-03

## 2024-10-03 NOTE — TELEPHONE ENCOUNTER
Had previously been on Lisinopril HCTZ but at OV 9/23 was c/o dizziness and was started on plain Lisinopril 20mg bid.Dizziness has resolved./80's but her feet are very swollen and go down just a little at night but not all the way.She does not weight daily but is a little SOB.She is placing her feet up and on low salt diet.She ask what she can do?

## 2024-10-03 NOTE — TELEPHONE ENCOUNTER
Pt call about the medication Lisinopril  that was prescribed  to her the last time she visit. The old medication had water pills combined and the new medication do not have it and pt ankle are swollen. Pt said it look like balloons.

## 2024-10-07 DIAGNOSIS — I48.0 PAROXYSMAL ATRIAL FIBRILLATION (HCC): ICD-10-CM

## 2024-10-07 NOTE — TELEPHONE ENCOUNTER
MEDICATION REFILL REQUEST      Name of Medication:  xarelto  Dose:  20 mg  Frequency:  QD  Quantity:  90  Days' supply: 90      [phone]  [phone]

## 2024-10-10 ENCOUNTER — OFFICE VISIT (OUTPATIENT)
Age: 79
End: 2024-10-10
Payer: COMMERCIAL

## 2024-10-10 VITALS
WEIGHT: 161 LBS | BODY MASS INDEX: 31.61 KG/M2 | SYSTOLIC BLOOD PRESSURE: 142 MMHG | HEART RATE: 117 BPM | HEIGHT: 60 IN | DIASTOLIC BLOOD PRESSURE: 88 MMHG

## 2024-10-10 DIAGNOSIS — E78.00 PURE HYPERCHOLESTEROLEMIA: ICD-10-CM

## 2024-10-10 DIAGNOSIS — I10 PRIMARY HYPERTENSION: ICD-10-CM

## 2024-10-10 DIAGNOSIS — I50.32 CHRONIC DIASTOLIC CONGESTIVE HEART FAILURE (HCC): ICD-10-CM

## 2024-10-10 DIAGNOSIS — I48.0 PAROXYSMAL ATRIAL FIBRILLATION (HCC): Primary | ICD-10-CM

## 2024-10-10 DIAGNOSIS — I71.21 ANEURYSM OF ASCENDING AORTA WITHOUT RUPTURE (HCC): ICD-10-CM

## 2024-10-10 PROCEDURE — 1123F ACP DISCUSS/DSCN MKR DOCD: CPT | Performed by: INTERNAL MEDICINE

## 2024-10-10 PROCEDURE — 3077F SYST BP >= 140 MM HG: CPT | Performed by: INTERNAL MEDICINE

## 2024-10-10 PROCEDURE — 93000 ELECTROCARDIOGRAM COMPLETE: CPT | Performed by: INTERNAL MEDICINE

## 2024-10-10 PROCEDURE — 3079F DIAST BP 80-89 MM HG: CPT | Performed by: INTERNAL MEDICINE

## 2024-10-10 PROCEDURE — 99214 OFFICE O/P EST MOD 30 MIN: CPT | Performed by: INTERNAL MEDICINE

## 2024-10-10 RX ORDER — DILTIAZEM HYDROCHLORIDE 120 MG/1
120 CAPSULE, COATED, EXTENDED RELEASE ORAL DAILY
Qty: 30 CAPSULE | Refills: 11 | Status: SHIPPED | OUTPATIENT
Start: 2024-10-10

## 2024-10-10 RX ORDER — FUROSEMIDE 20 MG
20 TABLET ORAL DAILY
Qty: 60 TABLET | Refills: 3 | Status: SHIPPED | OUTPATIENT
Start: 2024-10-10

## 2024-10-10 NOTE — PROGRESS NOTES
alcohol         ROS:    No obvious pertinent positives on review of systems except for what was outlined in the HPI today.      PHYSICAL EXAM:     BP (!) 142/88   Pulse (!) 117   Ht 1.524 m (5')   Wt 73 kg (161 lb)   BMI 31.44 kg/m²    General/Constitutional:   Alert and oriented x 3, no acute distress  HEENT:   normocephalic, atraumatic, moist mucous membranes  Neck:   No JVD or carotid bruits bilaterally  Cardiovascular:   tachy, irreg, no murmur/rub/gallop appreciated  Pulmonary:   clear to auscultation bilaterally, no respiratory distress  Abdomen:   soft, non-tender, non-distended  Ext:  mod LE edema bilaterally  Skin:  warm and dry, no obvious rashes seen  Neuro:   no obvious sensory or motor deficits  Psychiatric:   normal mood and affect      EKG Today and independently reviewed by me: AF RVR, normal intervals and non-specific ST/T wave changes.        Lab Results   Component Value Date/Time     05/29/2024 10:16 AM    K 3.9 05/29/2024 10:16 AM     05/29/2024 10:16 AM    CO2 30 05/29/2024 10:16 AM    BUN 14 05/29/2024 10:16 AM    CREATININE 0.71 05/29/2024 10:16 AM    GLUCOSE 99 05/29/2024 10:16 AM    CALCIUM 9.6 05/29/2024 10:16 AM        Lab Results   Component Value Date    WBC 7.2 05/29/2024    HGB 11.8 05/29/2024    HCT 36.8 05/29/2024    MCV 96.6 05/29/2024     05/29/2024       Lab Results   Component Value Date    TSH 3.400 05/29/2024       Lab Results   Component Value Date    LABA1C 5.8 (H) 05/11/2023     Lab Results   Component Value Date     05/11/2023       Lab Results   Component Value Date    CHOL 167 05/29/2024    CHOL 149 11/21/2023    CHOL 153 05/11/2023     Lab Results   Component Value Date    TRIG 111 05/29/2024    TRIG 70 11/21/2023    TRIG 63 05/11/2023     Lab Results   Component Value Date    HDL 57 05/29/2024    HDL 52 11/21/2023    HDL 56 05/11/2023     No components found for: \"LDLCHOLESTEROL\", \"LDLCALC\"  Lab Results   Component Value Date    VLDL 22

## 2024-10-16 DIAGNOSIS — I48.0 PAROXYSMAL ATRIAL FIBRILLATION (HCC): ICD-10-CM

## 2024-10-16 LAB
ALBUMIN SERPL-MCNC: 3.6 G/DL (ref 3.2–4.6)
ALBUMIN/GLOB SERPL: 1.1 (ref 1–1.9)
ALP SERPL-CCNC: 91 U/L (ref 35–104)
ALT SERPL-CCNC: 12 U/L (ref 8–45)
ANION GAP SERPL CALC-SCNC: 15 MMOL/L (ref 9–18)
AST SERPL-CCNC: 19 U/L (ref 15–37)
BILIRUB SERPL-MCNC: 0.3 MG/DL (ref 0–1.2)
BUN SERPL-MCNC: 14 MG/DL (ref 8–23)
CALCIUM SERPL-MCNC: 8.8 MG/DL (ref 8.8–10.2)
CHLORIDE SERPL-SCNC: 95 MMOL/L (ref 98–107)
CO2 SERPL-SCNC: 28 MMOL/L (ref 20–28)
CREAT SERPL-MCNC: 0.79 MG/DL (ref 0.6–1.1)
ERYTHROCYTE [DISTWIDTH] IN BLOOD BY AUTOMATED COUNT: 17.2 % (ref 11.9–14.6)
GLOBULIN SER CALC-MCNC: 3.4 G/DL (ref 2.3–3.5)
GLUCOSE SERPL-MCNC: 159 MG/DL (ref 70–99)
HCT VFR BLD AUTO: 26.4 % (ref 35.8–46.3)
HGB BLD-MCNC: 7.5 G/DL (ref 11.7–15.4)
MAGNESIUM SERPL-MCNC: 1.6 MG/DL (ref 1.8–2.4)
MCH RBC QN AUTO: 22.5 PG (ref 26.1–32.9)
MCHC RBC AUTO-ENTMCNC: 28.4 G/DL (ref 31.4–35)
MCV RBC AUTO: 79.3 FL (ref 82–102)
NRBC # BLD: 0 K/UL (ref 0–0.2)
NT PRO BNP: 1150 PG/ML (ref 0–450)
PLATELET # BLD AUTO: 374 K/UL (ref 150–450)
PMV BLD AUTO: 11.2 FL (ref 9.4–12.3)
POTASSIUM SERPL-SCNC: 3.1 MMOL/L (ref 3.5–5.1)
PROT SERPL-MCNC: 7.1 G/DL (ref 6.3–8.2)
RBC # BLD AUTO: 3.33 M/UL (ref 4.05–5.2)
SODIUM SERPL-SCNC: 138 MMOL/L (ref 136–145)
TSH, 3RD GENERATION: 1.71 UIU/ML (ref 0.27–4.2)
WBC # BLD AUTO: 7.4 K/UL (ref 4.3–11.1)

## 2024-10-17 ENCOUNTER — OFFICE VISIT (OUTPATIENT)
Age: 79
End: 2024-10-17
Payer: COMMERCIAL

## 2024-10-17 VITALS
DIASTOLIC BLOOD PRESSURE: 82 MMHG | BODY MASS INDEX: 29.06 KG/M2 | SYSTOLIC BLOOD PRESSURE: 130 MMHG | HEIGHT: 60 IN | WEIGHT: 148 LBS | HEART RATE: 96 BPM

## 2024-10-17 DIAGNOSIS — I48.0 PAROXYSMAL ATRIAL FIBRILLATION (HCC): ICD-10-CM

## 2024-10-17 DIAGNOSIS — I71.21 ANEURYSM OF ASCENDING AORTA WITHOUT RUPTURE (HCC): ICD-10-CM

## 2024-10-17 DIAGNOSIS — G72.0 STATIN MYOPATHY: ICD-10-CM

## 2024-10-17 DIAGNOSIS — T46.6X5A STATIN MYOPATHY: ICD-10-CM

## 2024-10-17 DIAGNOSIS — I10 PRIMARY HYPERTENSION: ICD-10-CM

## 2024-10-17 DIAGNOSIS — I50.32 CHRONIC DIASTOLIC CONGESTIVE HEART FAILURE (HCC): Primary | ICD-10-CM

## 2024-10-17 DIAGNOSIS — R06.02 SHORTNESS OF BREATH: ICD-10-CM

## 2024-10-17 PROCEDURE — 99214 OFFICE O/P EST MOD 30 MIN: CPT | Performed by: INTERNAL MEDICINE

## 2024-10-17 PROCEDURE — 1123F ACP DISCUSS/DSCN MKR DOCD: CPT | Performed by: INTERNAL MEDICINE

## 2024-10-17 PROCEDURE — 3079F DIAST BP 80-89 MM HG: CPT | Performed by: INTERNAL MEDICINE

## 2024-10-17 PROCEDURE — 3075F SYST BP GE 130 - 139MM HG: CPT | Performed by: INTERNAL MEDICINE

## 2024-10-17 RX ORDER — MAGNESIUM OXIDE 400 MG/1
400 TABLET ORAL DAILY
Qty: 30 TABLET | Refills: 1 | Status: SHIPPED | OUTPATIENT
Start: 2024-10-17

## 2024-10-17 RX ORDER — POTASSIUM CHLORIDE 1500 MG/1
20 TABLET, EXTENDED RELEASE ORAL 2 TIMES DAILY
Qty: 180 TABLET | Refills: 1 | Status: SHIPPED | OUTPATIENT
Start: 2024-10-17

## 2024-10-17 NOTE — PROGRESS NOTES
Take 2 tablets by mouth daily      Multiple Vitamin (MULTIVITAMIN ADULT PO) Take by mouth      CALCIUM-MAGNESIUM PO Take by mouth      esomeprazole (NEXIUM) 40 MG delayed release capsule Take 1 capsule by mouth daily as needed      lisinopril (PRINIVIL;ZESTRIL) 20 MG tablet Take 1 tablet by mouth 2 times daily 180 tablet 1    gabapentin (NEURONTIN) 100 MG capsule Take 1 capsule by mouth nightly for 180 days. Intended supply: 30 days (Patient not taking: Reported on 7/25/2024) 30 capsule 5     No current facility-administered medications for this visit.        Allergies   Allergen Reactions    Atorvastatin Other (See Comments)     Patient states had a bad reaction to this medicine. But cant remember.     Patient Active Problem List    Diagnosis Date Noted    Chronic diastolic congestive heart failure (HCC) 10/10/2024    Thoracic ascending aortic aneurysm (HCC)     Aortic atherosclerosis (HCC)     Stage 1 mild COPD by GOLD classification ([facility])     Hypertension     Aneurysm, carotid artery, internal 12/01/2020    Hyperlipemia     GERD (gastroesophageal reflux disease)     Paroxysmal atrial fibrillation (HCC) 05/18/2020    History of stroke 04/29/2020    Statin myopathy 04/29/2020    Low back pain 02/14/2020    Osteoarthritis of basilar joint of thumb     Osteopenia       Past Surgical History:   Procedure Laterality Date    CARPAL TUNNEL RELEASE Bilateral 2004, 2005    CATARACT REMOVAL Bilateral 07/10/2019    TONSILLECTOMY      TOTAL HIP ARTHROPLASTY Right 2019    TUBAL LIGATION       Family History   Problem Relation Age of Onset    COPD Sister     Stroke Mother     Lung Disease Father         collapsed lung; tobacco abuse     Heart Attack Father     Thyroid Cancer Mother     Diabetes Sister     Cancer Mother         cervical    Diabetes Mother     Thyroid Disease Niece         treated with medication    Cancer Brother         liver    Diabetes Brother      Social History     Tobacco Use    Smoking status: Former

## 2024-10-21 ENCOUNTER — OFFICE VISIT (OUTPATIENT)
Dept: INTERNAL MEDICINE CLINIC | Facility: CLINIC | Age: 79
End: 2024-10-21
Payer: COMMERCIAL

## 2024-10-21 VITALS
SYSTOLIC BLOOD PRESSURE: 112 MMHG | TEMPERATURE: 97 F | OXYGEN SATURATION: 99 % | WEIGHT: 147 LBS | DIASTOLIC BLOOD PRESSURE: 66 MMHG | HEART RATE: 81 BPM | HEIGHT: 60 IN | BODY MASS INDEX: 28.86 KG/M2

## 2024-10-21 DIAGNOSIS — D64.9 SYMPTOMATIC ANEMIA: Primary | ICD-10-CM

## 2024-10-21 DIAGNOSIS — I48.0 PAROXYSMAL ATRIAL FIBRILLATION (HCC): ICD-10-CM

## 2024-10-21 DIAGNOSIS — Z86.73 HISTORY OF STROKE: ICD-10-CM

## 2024-10-21 DIAGNOSIS — I10 PRIMARY HYPERTENSION: ICD-10-CM

## 2024-10-21 DIAGNOSIS — I67.1 ANEURYSM, CAROTID ARTERY, INTERNAL: ICD-10-CM

## 2024-10-21 DIAGNOSIS — E78.00 PURE HYPERCHOLESTEROLEMIA: ICD-10-CM

## 2024-10-21 DIAGNOSIS — I70.0 AORTIC ATHEROSCLEROSIS (HCC): ICD-10-CM

## 2024-10-21 DIAGNOSIS — I50.32 CHRONIC DIASTOLIC CONGESTIVE HEART FAILURE (HCC): ICD-10-CM

## 2024-10-21 DIAGNOSIS — I71.21 ANEURYSM OF ASCENDING AORTA WITHOUT RUPTURE (HCC): ICD-10-CM

## 2024-10-21 PROCEDURE — 1123F ACP DISCUSS/DSCN MKR DOCD: CPT | Performed by: INTERNAL MEDICINE

## 2024-10-21 PROCEDURE — 3078F DIAST BP <80 MM HG: CPT | Performed by: INTERNAL MEDICINE

## 2024-10-21 PROCEDURE — 3074F SYST BP LT 130 MM HG: CPT | Performed by: INTERNAL MEDICINE

## 2024-10-21 PROCEDURE — 99214 OFFICE O/P EST MOD 30 MIN: CPT | Performed by: INTERNAL MEDICINE

## 2024-10-21 PROCEDURE — G2211 COMPLEX E/M VISIT ADD ON: HCPCS | Performed by: INTERNAL MEDICINE

## 2024-10-21 RX ORDER — ACETAMINOPHEN 325 MG/1
650 TABLET ORAL ONCE
Status: CANCELLED | OUTPATIENT
Start: 2024-10-21 | End: 2024-10-21

## 2024-10-21 RX ORDER — EPINEPHRINE 1 MG/ML
0.3 INJECTION, SOLUTION, CONCENTRATE INTRAVENOUS PRN
Status: CANCELLED | OUTPATIENT
Start: 2024-10-21

## 2024-10-21 RX ORDER — EZETIMIBE 10 MG/1
10 TABLET ORAL DAILY
Qty: 90 TABLET | Refills: 1 | Status: SHIPPED | OUTPATIENT
Start: 2024-10-21

## 2024-10-21 RX ORDER — SODIUM CHLORIDE 9 MG/ML
INJECTION, SOLUTION INTRAVENOUS CONTINUOUS
Status: CANCELLED | OUTPATIENT
Start: 2024-10-21

## 2024-10-21 RX ORDER — DIPHENHYDRAMINE HYDROCHLORIDE 50 MG/ML
50 INJECTION INTRAMUSCULAR; INTRAVENOUS
Status: CANCELLED | OUTPATIENT
Start: 2024-10-21

## 2024-10-21 RX ORDER — FUROSEMIDE 10 MG/ML
20 INJECTION INTRAMUSCULAR; INTRAVENOUS ONCE
Status: CANCELLED | OUTPATIENT
Start: 2024-10-21 | End: 2024-10-21

## 2024-10-21 RX ORDER — ALBUTEROL SULFATE 90 UG/1
4 INHALANT RESPIRATORY (INHALATION) PRN
Status: CANCELLED | OUTPATIENT
Start: 2024-10-21

## 2024-10-21 RX ORDER — DIPHENHYDRAMINE HCL 25 MG
25 TABLET ORAL ONCE
Status: CANCELLED | OUTPATIENT
Start: 2024-10-21 | End: 2024-10-21

## 2024-10-21 RX ORDER — ACETAMINOPHEN 325 MG/1
650 TABLET ORAL
Status: CANCELLED | OUTPATIENT
Start: 2024-10-21

## 2024-10-21 RX ORDER — FAMOTIDINE 10 MG/ML
20 INJECTION, SOLUTION INTRAVENOUS
Status: CANCELLED | OUTPATIENT
Start: 2024-10-21

## 2024-10-21 RX ORDER — SODIUM CHLORIDE 9 MG/ML
20 INJECTION, SOLUTION INTRAVENOUS CONTINUOUS
Status: CANCELLED | OUTPATIENT
Start: 2024-10-21

## 2024-10-21 RX ORDER — FERRIC MALTOL 30 MG/1
1 CAPSULE ORAL DAILY
Qty: 30 CAPSULE | Refills: 0 | Status: SHIPPED | COMMUNITY
Start: 2024-10-21

## 2024-10-21 RX ORDER — SODIUM CHLORIDE 0.9 % (FLUSH) 0.9 %
5-40 SYRINGE (ML) INJECTION PRN
Status: CANCELLED | OUTPATIENT
Start: 2024-10-21

## 2024-10-21 RX ORDER — ONDANSETRON 2 MG/ML
8 INJECTION INTRAMUSCULAR; INTRAVENOUS
Status: CANCELLED | OUTPATIENT
Start: 2024-10-21

## 2024-10-21 RX ORDER — SODIUM CHLORIDE 9 MG/ML
25 INJECTION, SOLUTION INTRAVENOUS PRN
Status: CANCELLED | OUTPATIENT
Start: 2024-10-21

## 2024-10-21 ASSESSMENT — ENCOUNTER SYMPTOMS
ABDOMINAL PAIN: 0
CONSTIPATION: 0
DIARRHEA: 0
ANAL BLEEDING: 0
BLOOD IN STOOL: 0

## 2024-10-21 NOTE — PATIENT INSTRUCTIONS
Blood transfusion outpatient 2 units ordered    Avoid Aspirin, Advil, and Aleve    Remain off Xarelto blood thinning medication    Tylenol is ok for you to take.      Start Accrufer 30 mg Iron tablets.  30 days of samples provided.      Gastroenterology referral placed to assist with care and help to determine etiology of loss of blood.          Please call if you do not hear about an appointment for an outpatient transfusion in 24 hrs so I can assist further.      ARNALDO MYERS MD    Return in about 4 weeks (around 11/18/2024), or if symptoms worsen or fail to improve.

## 2024-10-21 NOTE — PROGRESS NOTES
ASSESSMENT/PLAN:  Assessment & Plan  1. Iron Deficiency Anemia.  Her hemoglobin level is 7.5, indicating significant anemia. She reports fatigue and a slight upset stomach but no visible blood in her stool. A blood transfusion of 2 units will be arranged at the outpatient infusion center to improve her hemoglobin levels and alleviate symptoms. She is advised to discontinue Xarelto and avoid aspirin, Advil, and Aleve. Iron tablets will be provided, to be taken with food, and she is advised that taking iron with orange juice may enhance absorption. A referral to Gastroenterology will be made to investigate potential causes of blood loss, such as a gastrointestinal ulcer.      Patient Instructions   Blood transfusion outpatient 2 units ordered    Avoid Aspirin, Advil, and Aleve    Remain off Xarelto blood thinning medication    Tylenol is ok for you to take.      Start Accrufer 30 mg Iron tablets.  30 days of samples provided.      Gastroenterology referral placed to assist with care and help to determine etiology of loss of blood.          Please call if you do not hear about an appointment for an outpatient transfusion in 24 hrs so I can assist further.      ARNALDO MYERS MD    Return in about 4 weeks (around 11/18/2024), or if symptoms worsen or fail to improve.      1. Symptomatic anemia  -     [facility]  -     ACCRUFER 30 MG CAPS; Take 1 capsule by mouth daily, Disp-30 capsule, R-0, DAWSample  2. Pure hypercholesterolemia  -     ezetimibe (ZETIA) 10 MG tablet; Take 1 tablet by mouth daily, Disp-90 tablet, R-1Normal  -     [facility]  3. Paroxysmal atrial fibrillation (HCC)  -     [facility]  4. Primary hypertension  -     [facility]  5. Aortic atherosclerosis (HCC)  -     [facility]  6. Aneurysm, carotid artery,

## 2024-10-22 ENCOUNTER — HOSPITAL ENCOUNTER (OUTPATIENT)
Dept: LAB | Age: 79
Discharge: HOME | End: 2024-10-22
Payer: COMMERCIAL

## 2024-10-22 LAB
BASOPHILS # BLD: 0.1 K/UL (ref 0–0.2)
BASOPHILS NFR BLD: 1 % (ref 0–2)
DIFFERENTIAL METHOD BLD: ABNORMAL
EOSINOPHIL # BLD: 0.2 K/UL (ref 0–0.8)
EOSINOPHIL NFR BLD: 3 % (ref 0.5–7.8)
ERYTHROCYTE [DISTWIDTH] IN BLOOD BY AUTOMATED COUNT: 17.2 % (ref 11.9–14.6)
HCT VFR BLD AUTO: 26.9 % (ref 35.8–46.3)
HGB BLD-MCNC: 7.4 G/DL (ref 11.7–15.4)
IMM GRANULOCYTES # BLD AUTO: 0 K/UL (ref 0–0.5)
IMM GRANULOCYTES NFR BLD AUTO: 0 % (ref 0–5)
LYMPHOCYTES # BLD: 1.9 K/UL (ref 0.5–4.6)
LYMPHOCYTES NFR BLD: 30 % (ref 13–44)
MCH RBC QN AUTO: 21.4 PG (ref 26.1–32.9)
MCHC RBC AUTO-ENTMCNC: 27.5 G/DL (ref 31.4–35)
MCV RBC AUTO: 78 FL (ref 82–102)
MONOCYTES # BLD: 0.7 K/UL (ref 0.1–1.3)
MONOCYTES NFR BLD: 10 % (ref 4–12)
NEUTS SEG # BLD: 3.5 K/UL (ref 1.7–8.2)
NEUTS SEG NFR BLD: 55 % (ref 43–78)
NRBC # BLD: 0 K/UL (ref 0–0.2)
PLATELET # BLD AUTO: 471 K/UL (ref 150–450)
PMV BLD AUTO: 10.6 FL (ref 9.4–12.3)
RBC # BLD AUTO: 3.45 M/UL (ref 4.05–5.2)
WBC # BLD AUTO: 6.4 K/UL (ref 4.3–11.1)

## 2024-10-22 PROCEDURE — 86923 COMPATIBILITY TEST ELECTRIC: CPT

## 2024-10-22 PROCEDURE — 86900 BLOOD TYPING SEROLOGIC ABO: CPT

## 2024-10-22 PROCEDURE — 86850 RBC ANTIBODY SCREEN: CPT

## 2024-10-22 PROCEDURE — 86901 BLOOD TYPING SEROLOGIC RH(D): CPT

## 2024-10-23 LAB — HISTORY CHECK: NORMAL

## 2024-10-24 ENCOUNTER — HOSPITAL ENCOUNTER (OUTPATIENT)
Dept: INFUSION THERAPY | Age: 79
Setting detail: INFUSION SERIES
Discharge: HOME | End: 2024-10-24
Payer: COMMERCIAL

## 2024-10-24 VITALS
RESPIRATION RATE: 16 BRPM | SYSTOLIC BLOOD PRESSURE: 127 MMHG | OXYGEN SATURATION: 97 % | HEART RATE: 90 BPM | TEMPERATURE: 98.6 F | DIASTOLIC BLOOD PRESSURE: 70 MMHG

## 2024-10-24 DIAGNOSIS — I10 PRIMARY HYPERTENSION: ICD-10-CM

## 2024-10-24 DIAGNOSIS — I48.0 PAROXYSMAL ATRIAL FIBRILLATION (HCC): ICD-10-CM

## 2024-10-24 DIAGNOSIS — D64.9 SYMPTOMATIC ANEMIA: ICD-10-CM

## 2024-10-24 DIAGNOSIS — I67.1 ANEURYSM, CAROTID ARTERY, INTERNAL: ICD-10-CM

## 2024-10-24 DIAGNOSIS — I71.21 ANEURYSM OF ASCENDING AORTA WITHOUT RUPTURE (HCC): Primary | ICD-10-CM

## 2024-10-24 DIAGNOSIS — I50.32 CHRONIC DIASTOLIC CONGESTIVE HEART FAILURE (HCC): ICD-10-CM

## 2024-10-24 DIAGNOSIS — I70.0 AORTIC ATHEROSCLEROSIS (HCC): ICD-10-CM

## 2024-10-24 DIAGNOSIS — Z86.73 HISTORY OF STROKE: ICD-10-CM

## 2024-10-24 PROCEDURE — 96375 TX/PRO/DX INJ NEW DRUG ADDON: CPT

## 2024-10-24 PROCEDURE — 36430 TRANSFUSION BLD/BLD COMPNT: CPT

## 2024-10-24 PROCEDURE — P9016 RBC LEUKOCYTES REDUCED: HCPCS

## 2024-10-24 PROCEDURE — 6370000000 HC RX 637 (ALT 250 FOR IP): Performed by: INTERNAL MEDICINE

## 2024-10-24 PROCEDURE — 96374 THER/PROPH/DIAG INJ IV PUSH: CPT

## 2024-10-24 PROCEDURE — 6360000002 HC RX W HCPCS: Performed by: INTERNAL MEDICINE

## 2024-10-24 RX ORDER — DIPHENHYDRAMINE HYDROCHLORIDE 50 MG/ML
50 INJECTION INTRAMUSCULAR; INTRAVENOUS
Status: DISCONTINUED | OUTPATIENT
Start: 2024-10-24 | End: 2024-10-25 | Stop reason: HOSPADM

## 2024-10-24 RX ORDER — ONDANSETRON 2 MG/ML
8 INJECTION INTRAMUSCULAR; INTRAVENOUS
OUTPATIENT
Start: 2024-10-24

## 2024-10-24 RX ORDER — ALBUTEROL SULFATE 90 UG/1
4 INHALANT RESPIRATORY (INHALATION) PRN
OUTPATIENT
Start: 2024-10-24

## 2024-10-24 RX ORDER — SODIUM CHLORIDE 9 MG/ML
25 INJECTION, SOLUTION INTRAVENOUS PRN
Status: CANCELLED | OUTPATIENT
Start: 2024-10-24

## 2024-10-24 RX ORDER — ONDANSETRON 2 MG/ML
8 INJECTION INTRAMUSCULAR; INTRAVENOUS
Status: DISCONTINUED | OUTPATIENT
Start: 2024-10-24 | End: 2024-10-25 | Stop reason: HOSPADM

## 2024-10-24 RX ORDER — SODIUM CHLORIDE 0.9 % (FLUSH) 0.9 %
5-40 SYRINGE (ML) INJECTION PRN
Status: CANCELLED | OUTPATIENT
Start: 2024-10-24

## 2024-10-24 RX ORDER — SODIUM CHLORIDE 9 MG/ML
20 INJECTION, SOLUTION INTRAVENOUS CONTINUOUS
Status: CANCELLED | OUTPATIENT
Start: 2024-10-24

## 2024-10-24 RX ORDER — SODIUM CHLORIDE 9 MG/ML
20 INJECTION, SOLUTION INTRAVENOUS CONTINUOUS
Status: DISCONTINUED | OUTPATIENT
Start: 2024-10-24 | End: 2024-10-25 | Stop reason: HOSPADM

## 2024-10-24 RX ORDER — EPINEPHRINE 1 MG/ML
0.3 INJECTION, SOLUTION, CONCENTRATE INTRAVENOUS PRN
Status: DISCONTINUED | OUTPATIENT
Start: 2024-10-24 | End: 2024-10-25 | Stop reason: HOSPADM

## 2024-10-24 RX ORDER — EPINEPHRINE 1 MG/ML
0.3 INJECTION, SOLUTION, CONCENTRATE INTRAVENOUS PRN
OUTPATIENT
Start: 2024-10-24

## 2024-10-24 RX ORDER — SODIUM CHLORIDE 9 MG/ML
25 INJECTION, SOLUTION INTRAVENOUS PRN
Status: DISCONTINUED | OUTPATIENT
Start: 2024-10-24 | End: 2024-10-25 | Stop reason: HOSPADM

## 2024-10-24 RX ORDER — ACETAMINOPHEN 325 MG/1
650 TABLET ORAL ONCE
Status: COMPLETED | OUTPATIENT
Start: 2024-10-24 | End: 2024-10-24

## 2024-10-24 RX ORDER — SODIUM CHLORIDE 0.9 % (FLUSH) 0.9 %
5-40 SYRINGE (ML) INJECTION PRN
Status: DISCONTINUED | OUTPATIENT
Start: 2024-10-24 | End: 2024-10-25 | Stop reason: HOSPADM

## 2024-10-24 RX ORDER — ACETAMINOPHEN 325 MG/1
650 TABLET ORAL
OUTPATIENT
Start: 2024-10-24

## 2024-10-24 RX ORDER — DIPHENHYDRAMINE HCL 25 MG
25 CAPSULE ORAL ONCE
Status: CANCELLED | OUTPATIENT
Start: 2024-10-24 | End: 2024-10-24

## 2024-10-24 RX ORDER — SODIUM CHLORIDE 9 MG/ML
INJECTION, SOLUTION INTRAVENOUS CONTINUOUS
Status: DISCONTINUED | OUTPATIENT
Start: 2024-10-24 | End: 2024-10-25 | Stop reason: HOSPADM

## 2024-10-24 RX ORDER — FUROSEMIDE 10 MG/ML
20 INJECTION INTRAMUSCULAR; INTRAVENOUS ONCE
Status: COMPLETED | OUTPATIENT
Start: 2024-10-24 | End: 2024-10-24

## 2024-10-24 RX ORDER — ACETAMINOPHEN 325 MG/1
650 TABLET ORAL ONCE
Status: CANCELLED | OUTPATIENT
Start: 2024-10-24 | End: 2024-10-24

## 2024-10-24 RX ORDER — FUROSEMIDE 10 MG/ML
20 INJECTION INTRAMUSCULAR; INTRAVENOUS ONCE
Status: CANCELLED | OUTPATIENT
Start: 2024-10-24 | End: 2024-10-24

## 2024-10-24 RX ORDER — DIPHENHYDRAMINE HYDROCHLORIDE 50 MG/ML
50 INJECTION INTRAMUSCULAR; INTRAVENOUS
OUTPATIENT
Start: 2024-10-24

## 2024-10-24 RX ORDER — ACETAMINOPHEN 325 MG/1
650 TABLET ORAL
Status: DISCONTINUED | OUTPATIENT
Start: 2024-10-24 | End: 2024-10-25 | Stop reason: HOSPADM

## 2024-10-24 RX ORDER — DIPHENHYDRAMINE HCL 25 MG
25 CAPSULE ORAL ONCE
Status: COMPLETED | OUTPATIENT
Start: 2024-10-24 | End: 2024-10-24

## 2024-10-24 RX ORDER — SODIUM CHLORIDE 9 MG/ML
INJECTION, SOLUTION INTRAVENOUS CONTINUOUS
OUTPATIENT
Start: 2024-10-24

## 2024-10-24 RX ORDER — ALBUTEROL SULFATE 90 UG/1
4 INHALANT RESPIRATORY (INHALATION) PRN
Status: DISCONTINUED | OUTPATIENT
Start: 2024-10-24 | End: 2024-10-25 | Stop reason: HOSPADM

## 2024-10-24 RX ADMIN — FUROSEMIDE 20 MG: 10 INJECTION, SOLUTION INTRAMUSCULAR; INTRAVENOUS at 17:13

## 2024-10-24 RX ADMIN — HYDROCORTISONE SODIUM SUCCINATE 25 MG: 100 INJECTION, POWDER, FOR SOLUTION INTRAMUSCULAR; INTRAVENOUS at 14:19

## 2024-10-24 RX ADMIN — ACETAMINOPHEN 650 MG: 325 TABLET ORAL at 14:18

## 2024-10-24 RX ADMIN — DIPHENHYDRAMINE HYDROCHLORIDE 25 MG: 25 CAPSULE ORAL at 14:18

## 2024-10-24 NOTE — PROGRESS NOTES
Arrived to the Infusion Center.  Assessment completed. 1 unit PRBC completed. Patient tolerated without problems. Any issues or concerns during appointment: None  Blood education and potential side effects reviewed with patient and written information on potential side effects and when to call physician sent home with patient  Instructed to call provider with any side effects or concerns  Patient aware of next infusion appointment on 10/25/24(date) at 8 30 AM (time) for 2nd unit of blood  Discharged ambulatory       (3) occasionally moist

## 2024-10-25 ENCOUNTER — HOSPITAL ENCOUNTER (OUTPATIENT)
Dept: INFUSION THERAPY | Age: 79
Setting detail: INFUSION SERIES
Discharge: HOME | End: 2024-10-25
Payer: COMMERCIAL

## 2024-10-25 VITALS
RESPIRATION RATE: 16 BRPM | TEMPERATURE: 97.7 F | SYSTOLIC BLOOD PRESSURE: 114 MMHG | OXYGEN SATURATION: 96 % | DIASTOLIC BLOOD PRESSURE: 65 MMHG | HEART RATE: 76 BPM

## 2024-10-25 PROCEDURE — P9016 RBC LEUKOCYTES REDUCED: HCPCS

## 2024-10-25 PROCEDURE — 6370000000 HC RX 637 (ALT 250 FOR IP): Performed by: INTERNAL MEDICINE

## 2024-10-25 PROCEDURE — 36430 TRANSFUSION BLD/BLD COMPNT: CPT

## 2024-10-25 PROCEDURE — 2580000003 HC RX 258: Performed by: INTERNAL MEDICINE

## 2024-10-25 RX ORDER — DIPHENHYDRAMINE HCL 25 MG
25 CAPSULE ORAL ONCE
Status: COMPLETED | OUTPATIENT
Start: 2024-10-25 | End: 2024-10-25

## 2024-10-25 RX ORDER — SODIUM CHLORIDE 0.9 % (FLUSH) 0.9 %
5-40 SYRINGE (ML) INJECTION PRN
Status: DISCONTINUED | OUTPATIENT
Start: 2024-10-25 | End: 2024-10-26 | Stop reason: HOSPADM

## 2024-10-25 RX ORDER — ACETAMINOPHEN 325 MG/1
650 TABLET ORAL ONCE
Status: COMPLETED | OUTPATIENT
Start: 2024-10-25 | End: 2024-10-25

## 2024-10-25 RX ADMIN — SODIUM CHLORIDE, PRESERVATIVE FREE 10 ML: 5 INJECTION INTRAVENOUS at 11:00

## 2024-10-25 RX ADMIN — DIPHENHYDRAMINE HYDROCHLORIDE 25 MG: 25 CAPSULE ORAL at 09:21

## 2024-10-25 RX ADMIN — ACETAMINOPHEN 650 MG: 325 TABLET ORAL at 09:21

## 2024-10-25 NOTE — PROGRESS NOTES
Pt arrived ambulatory.  Premeds and 1 unit PRBCs completed without complications.  Pt has no future OP appts scheduled at this time.  Discharged amulatory, no distress noted.  Patient instructed to call provider with temperature of 100.4 or greater or nausea/vomiting/ diarrhea or pain not controlled by medications

## 2024-10-26 LAB
ABO + RH BLD: NORMAL
BLD PROD TYP BPU: NORMAL
BLD PROD TYP BPU: NORMAL
BLOOD BANK BLOOD PRODUCT EXPIRATION DATE: NORMAL
BLOOD BANK BLOOD PRODUCT EXPIRATION DATE: NORMAL
BLOOD BANK DISPENSE STATUS: NORMAL
BLOOD BANK DISPENSE STATUS: NORMAL
BLOOD BANK ISBT PRODUCT BLOOD TYPE: 6200
BLOOD BANK ISBT PRODUCT BLOOD TYPE: 6200
BLOOD BANK PRODUCT CODE: NORMAL
BLOOD BANK UNIT TYPE AND RH: NORMAL
BLOOD BANK UNIT TYPE AND RH: NORMAL
BLOOD GROUP ANTIBODIES SERPL: NORMAL
BPU ID: NORMAL
BPU ID: NORMAL
CROSSMATCH RESULT: NORMAL
CROSSMATCH RESULT: NORMAL
SPECIMEN EXP DATE BLD: NORMAL
UNIT DIVISION: 0
UNIT DIVISION: 0
UNIT ISSUE DATE/TIME: NORMAL
UNIT ISSUE DATE/TIME: NORMAL

## 2024-10-31 NOTE — TELEPHONE ENCOUNTER
MEDICATION REFILL REQUEST      Name of Medication:  Furosemide  Dose:  20 mg  Frequency:  BID  Quantity:  180  Days' supply:  90 with 3 refills      Pharmacy Name/Location:  [phone]    Pt is calling needing a new rx because Dr rowley told her 1 to 2 a day and it takes 2 a day   So she needs a new RX stating 2 pills a day.

## 2024-11-01 RX ORDER — FUROSEMIDE 20 MG/1
20 TABLET ORAL 2 TIMES DAILY
Qty: 180 TABLET | Refills: 3 | Status: SHIPPED | OUTPATIENT
Start: 2024-11-01

## 2024-11-06 ENCOUNTER — TELEPHONE (OUTPATIENT)
Age: 79
End: 2024-11-06

## 2024-11-06 NOTE — TELEPHONE ENCOUNTER
Patient called stating she has the following questions :    Received letter from insurer authorizing a CT scan of heart 3D scan  Patient would like to know what that is for?      Please call and advise.

## 2024-11-07 NOTE — TELEPHONE ENCOUNTER
Called pt states unsure if was suppose to get CT done. Advised do not see any orders on my end only the NST. Pt gave a verbal understanding.     Pt states wanting to make sure a PA was done for th NST before appt day?

## 2024-11-07 NOTE — TELEPHONE ENCOUNTER
Advised by precert department can do PA only 2 weeks prior, but that it should be approved since have enough time to file PA.

## 2024-11-13 ENCOUNTER — OFFICE VISIT (OUTPATIENT)
Age: 79
End: 2024-11-13
Payer: COMMERCIAL

## 2024-11-13 VITALS
WEIGHT: 146.6 LBS | BODY MASS INDEX: 28.78 KG/M2 | RESPIRATION RATE: 17 BRPM | SYSTOLIC BLOOD PRESSURE: 120 MMHG | HEIGHT: 60 IN | OXYGEN SATURATION: 96 % | HEART RATE: 81 BPM | DIASTOLIC BLOOD PRESSURE: 72 MMHG

## 2024-11-13 DIAGNOSIS — Z80.0 FAMILY HX OF COLON CANCER: ICD-10-CM

## 2024-11-13 DIAGNOSIS — K21.9 GASTROESOPHAGEAL REFLUX DISEASE, UNSPECIFIED WHETHER ESOPHAGITIS PRESENT: ICD-10-CM

## 2024-11-13 DIAGNOSIS — D64.9 ANEMIA, UNSPECIFIED TYPE: Primary | ICD-10-CM

## 2024-11-13 DIAGNOSIS — R71.0 HEMOGLOBIN DROP: ICD-10-CM

## 2024-11-13 DIAGNOSIS — R11.0 CHRONIC NAUSEA: ICD-10-CM

## 2024-11-13 DIAGNOSIS — D64.9 ANEMIA, UNSPECIFIED TYPE: ICD-10-CM

## 2024-11-13 DIAGNOSIS — Z87.19 HX OF HIATAL HERNIA: ICD-10-CM

## 2024-11-13 LAB
FERRITIN SERPL-MCNC: 39 NG/ML (ref 8–388)
FOLATE SERPL-MCNC: 7 NG/ML (ref 3.1–17.5)
IRON SATN MFR SERPL: 20 % (ref 20–50)
IRON SERPL-MCNC: 85 UG/DL (ref 35–100)
TIBC SERPL-MCNC: 427 UG/DL (ref 240–450)
UIBC SERPL-MCNC: 342 UG/DL (ref 112–347)
VIT B12 SERPL-MCNC: 412 PG/ML (ref 193–986)

## 2024-11-13 PROCEDURE — 3078F DIAST BP <80 MM HG: CPT | Performed by: PHYSICIAN ASSISTANT

## 2024-11-13 PROCEDURE — 99204 OFFICE O/P NEW MOD 45 MIN: CPT | Performed by: PHYSICIAN ASSISTANT

## 2024-11-13 PROCEDURE — 3074F SYST BP LT 130 MM HG: CPT | Performed by: PHYSICIAN ASSISTANT

## 2024-11-13 PROCEDURE — 1123F ACP DISCUSS/DSCN MKR DOCD: CPT | Performed by: PHYSICIAN ASSISTANT

## 2024-11-13 PROCEDURE — 1159F MED LIST DOCD IN RCRD: CPT | Performed by: PHYSICIAN ASSISTANT

## 2024-11-13 PROCEDURE — 1160F RVW MEDS BY RX/DR IN RCRD: CPT | Performed by: PHYSICIAN ASSISTANT

## 2024-11-13 NOTE — PROGRESS NOTES
rate. Rhythm irregular.      Heart sounds: No murmur heard.  Pulmonary:      Effort: Pulmonary effort is normal. No respiratory distress.      Breath sounds: Normal breath sounds.   Abdominal:      General: There is no distension.      Palpations: Abdomen is soft.      Tenderness: There is abdominal tenderness (mild discomfort epigastrium). There is no guarding or rebound.   Skin:     General: Skin is warm and dry.      Coloration: Skin is not jaundiced.   Neurological:      General: No focal deficit present.      Mental Status: She is alert and oriented to person, place, and time.   Psychiatric:         Mood and Affect: Mood normal.         Behavior: Behavior normal.         Thought Content: Thought content normal.         Judgment: Judgment normal.              Return for scheduled EGD, scheduled colonoscopy, follow-up visit 1-2 weeks after procedure.            An electronic signature was used to authenticate this note.    --Melissa R Grinnell, PA-C

## 2024-11-14 ENCOUNTER — TELEPHONE (OUTPATIENT)
Age: 79
End: 2024-11-14

## 2024-11-14 ENCOUNTER — TELEPHONE (OUTPATIENT)
Dept: GASTROENTEROLOGY | Age: 79
End: 2024-11-14

## 2024-11-14 ENCOUNTER — PREP FOR PROCEDURE (OUTPATIENT)
Dept: GASTROENTEROLOGY | Age: 79
End: 2024-11-14

## 2024-11-14 DIAGNOSIS — Z87.19 HX OF HIATAL HERNIA: ICD-10-CM

## 2024-11-14 DIAGNOSIS — I10 PRIMARY HYPERTENSION: Primary | ICD-10-CM

## 2024-11-14 DIAGNOSIS — R71.0 HEMOGLOBIN DROP: ICD-10-CM

## 2024-11-14 DIAGNOSIS — R11.0 CHRONIC NAUSEA: ICD-10-CM

## 2024-11-14 DIAGNOSIS — E78.00 PURE HYPERCHOLESTEROLEMIA: ICD-10-CM

## 2024-11-14 DIAGNOSIS — Z80.0 FAMILY HISTORY OF COLON CANCER: ICD-10-CM

## 2024-11-14 PROBLEM — D64.9 ANEMIA: Status: ACTIVE | Noted: 2024-11-14

## 2024-11-14 PROBLEM — K21.9 GASTROESOPHAGEAL REFLUX DISEASE: Status: ACTIVE | Noted: 2024-11-14

## 2024-11-14 RX ORDER — SODIUM CHLORIDE 0.9 % (FLUSH) 0.9 %
5-40 SYRINGE (ML) INJECTION EVERY 12 HOURS SCHEDULED
Status: CANCELLED | OUTPATIENT
Start: 2024-11-14

## 2024-11-14 RX ORDER — SODIUM CHLORIDE 0.9 % (FLUSH) 0.9 %
5-40 SYRINGE (ML) INJECTION PRN
Status: CANCELLED | OUTPATIENT
Start: 2024-11-14

## 2024-11-14 RX ORDER — SODIUM CHLORIDE 9 MG/ML
25 INJECTION, SOLUTION INTRAVENOUS PRN
Status: CANCELLED | OUTPATIENT
Start: 2024-11-14

## 2024-11-14 NOTE — TELEPHONE ENCOUNTER
Cardiac Clearance        Physician or Practice Requesting: Gastroenterology   : Maricel  Contact Phone Number: [phone]  Fax Number: [phone]  Date of Surgery/Procedure: 11/21/24  Type of Surgery or Procedure: Colonoscopy / EGD   Type of Anesthesia:   Type of Clearance Requested: risk assessment and any medication hold   Medication to Hold:?  Days to Hold: ?

## 2024-11-14 NOTE — TELEPHONE ENCOUNTER
Drink two more 16-ounce glasses of water. Must finish drinking the final glass of water at least 4 hours prior to arrival time.    NOTHING TO EAT UNTIL AFTER YOUR PROCEDURE.    NOTHING TO DRINK 4 HOURS PRIOR TO COLONOSCOPY.    IMPORTANT:     If you do not follow these instructions, your colonoscopy could be canceled due to having an unclean prep.

## 2024-11-15 ENCOUNTER — TELEPHONE (OUTPATIENT)
Age: 79
End: 2024-11-15

## 2024-11-15 DIAGNOSIS — Z12.11 ENCOUNTER FOR SCREENING COLONOSCOPY: Primary | ICD-10-CM

## 2024-11-15 RX ORDER — SODIUM, POTASSIUM,MAG SULFATES 17.5-3.13G
1 SOLUTION, RECONSTITUTED, ORAL ORAL ONCE
Qty: 1 EACH | Refills: 0 | Status: SHIPPED | OUTPATIENT
Start: 2024-11-15 | End: 2024-11-15

## 2024-11-15 NOTE — TELEPHONE ENCOUNTER
MEDICATION REFILL REQUEST      Name of Medication:  Xarelto   Dose:    Frequency:    Quantity:    Days' supply:  30 day       [facility]

## 2024-11-18 ENCOUNTER — OFFICE VISIT (OUTPATIENT)
Age: 79
End: 2024-11-18
Payer: COMMERCIAL

## 2024-11-18 ENCOUNTER — TELEPHONE (OUTPATIENT)
Age: 79
End: 2024-11-18

## 2024-11-18 VITALS
SYSTOLIC BLOOD PRESSURE: 122 MMHG | WEIGHT: 147 LBS | DIASTOLIC BLOOD PRESSURE: 72 MMHG | BODY MASS INDEX: 28.86 KG/M2 | HEIGHT: 60 IN | HEART RATE: 80 BPM

## 2024-11-18 DIAGNOSIS — I70.0 AORTIC ATHEROSCLEROSIS (HCC): ICD-10-CM

## 2024-11-18 DIAGNOSIS — I71.21 ANEURYSM OF ASCENDING AORTA WITHOUT RUPTURE (HCC): ICD-10-CM

## 2024-11-18 DIAGNOSIS — I48.0 PAROXYSMAL ATRIAL FIBRILLATION (HCC): ICD-10-CM

## 2024-11-18 DIAGNOSIS — I50.32 CHRONIC DIASTOLIC CONGESTIVE HEART FAILURE (HCC): ICD-10-CM

## 2024-11-18 DIAGNOSIS — I71.21 ANEURYSM OF ASCENDING AORTA WITHOUT RUPTURE (HCC): Primary | ICD-10-CM

## 2024-11-18 DIAGNOSIS — T46.6X5A STATIN MYOPATHY: ICD-10-CM

## 2024-11-18 DIAGNOSIS — I10 PRIMARY HYPERTENSION: ICD-10-CM

## 2024-11-18 DIAGNOSIS — G72.0 STATIN MYOPATHY: ICD-10-CM

## 2024-11-18 DIAGNOSIS — E78.00 PURE HYPERCHOLESTEROLEMIA: ICD-10-CM

## 2024-11-18 LAB
ANION GAP SERPL CALC-SCNC: 11 MMOL/L (ref 7–16)
BUN SERPL-MCNC: 10 MG/DL (ref 8–23)
CALCIUM SERPL-MCNC: 9.3 MG/DL (ref 8.8–10.2)
CHLORIDE SERPL-SCNC: 97 MMOL/L (ref 98–107)
CO2 SERPL-SCNC: 31 MMOL/L (ref 20–29)
CREAT SERPL-MCNC: 0.93 MG/DL (ref 0.6–1.1)
ERYTHROCYTE [DISTWIDTH] IN BLOOD BY AUTOMATED COUNT: 23.2 % (ref 11.9–14.6)
GLUCOSE SERPL-MCNC: 94 MG/DL (ref 70–99)
HCT VFR BLD AUTO: 37.9 % (ref 35.8–46.3)
HGB BLD-MCNC: 11.5 G/DL (ref 11.7–15.4)
MAGNESIUM SERPL-MCNC: 1.8 MG/DL (ref 1.8–2.4)
MCH RBC QN AUTO: 24.7 PG (ref 26.1–32.9)
MCHC RBC AUTO-ENTMCNC: 30.3 G/DL (ref 31.4–35)
MCV RBC AUTO: 81.5 FL (ref 82–102)
NRBC # BLD: 0 K/UL (ref 0–0.2)
PLATELET # BLD AUTO: 261 K/UL (ref 150–450)
PMV BLD AUTO: 11.1 FL (ref 9.4–12.3)
POTASSIUM SERPL-SCNC: 3.6 MMOL/L (ref 3.5–5.1)
RBC # BLD AUTO: 4.65 M/UL (ref 4.05–5.2)
SODIUM SERPL-SCNC: 139 MMOL/L (ref 136–145)
WBC # BLD AUTO: 9.2 K/UL (ref 4.3–11.1)

## 2024-11-18 PROCEDURE — 3074F SYST BP LT 130 MM HG: CPT | Performed by: INTERNAL MEDICINE

## 2024-11-18 PROCEDURE — 3078F DIAST BP <80 MM HG: CPT | Performed by: INTERNAL MEDICINE

## 2024-11-18 PROCEDURE — 1123F ACP DISCUSS/DSCN MKR DOCD: CPT | Performed by: INTERNAL MEDICINE

## 2024-11-18 PROCEDURE — 99214 OFFICE O/P EST MOD 30 MIN: CPT | Performed by: INTERNAL MEDICINE

## 2024-11-18 PROCEDURE — 1126F AMNT PAIN NOTED NONE PRSNT: CPT | Performed by: INTERNAL MEDICINE

## 2024-11-18 NOTE — TELEPHONE ENCOUNTER
Called pt to verify if still taking Xarelto. Pt states currently off of med until advised by Dr. Lowry to resume. Pt states will let us know once needing a refill.

## 2024-11-18 NOTE — PROGRESS NOTES
[address] [facility], [place] [address]  PHONE: [phone]     24    NAME:  Magdalene Whittington  : 1945  MRN: [id number]       SUBJECTIVE:   Magdalene Whittington is a 78 y.o. female seen for a follow up visit regarding the following:     Chief Complaint   Patient presents with    Hypertension     Abn NST        HPI: Here for PAF, Aneurysm.       Echo : AO 4.1cm, normal EF. Mod AI and MR.   CTA 2021:   Stable to slightly enlarged ascending tubular thoracic aorta now measuring   4.2 cm.   Echo 2023: normal EF, mild to mod AI and MR.     NST 2024: The study is positive for myocardial ischemia. Findings suggest a high risk of cardiac events.      EGD and Colon planned this week for work-up of anemia.  LE edema and OG better on lasix and Dilt.  Had 2 units of PRBC 2 weeks ago.  She has felt better since 2 units, but feeling weak again.  No CP now.  Some OG, but not worse now.    Lives home alone now.   No CP.  No pressure.       OFF xarelto with anemia now.     Patient denies recent history of orthopnea, PND, excessive dizziness and/or syncope.              Past Medical History, Past Surgical History, Family history, Social History, and Medications were all reviewed with the patient today and updated as necessary.     Current Outpatient Medications   Medication Sig Dispense Refill    furosemide (LASIX) 20 MG tablet Take 1 tablet by mouth 2 times daily 180 tablet 3    ezetimibe (ZETIA) 10 MG tablet Take 1 tablet by mouth daily 90 tablet 1    ACCRUFER 30 MG CAPS Take 1 capsule by mouth daily (Patient taking differently: Take 1 capsule by mouth daily IRON) 30 capsule 0    potassium chloride (KLOR-CON M) 20 MEQ extended release tablet Take 1 tablet by mouth 2 times daily 180 tablet 1    magnesium oxide (MAG-OX) 400 MG tablet Take 1 tablet by mouth daily 30 tablet 1    dilTIAZem (CARDIZEM CD) 120 MG extended release capsule Take 1 capsule by mouth daily 30 capsule 11    lisinopril

## 2024-11-18 NOTE — TELEPHONE ENCOUNTER
Dr Lowry there isn't a 2 week echo available, there was in [facility] but pt unable to go there.  Her Echo is scheduled for the 30th of December.  Does she need an appt with you sooner than after that echo?

## 2024-11-18 NOTE — TELEPHONE ENCOUNTER
----- Message from Dr. Eliceo Lowry, DO sent at 11/18/2024  8:43 AM EST -----  She had a very abnormal NST, can you call her, see if she can come see me today as 1pm overbook slot?  Thanks

## 2024-11-20 ENCOUNTER — ANESTHESIA EVENT (OUTPATIENT)
Dept: ENDOSCOPY | Age: 79
End: 2024-11-20
Payer: COMMERCIAL

## 2024-11-20 NOTE — PROGRESS NOTES
Patient verified name, , and procedure.    Type: 1a; abbreviated assessment per anesthesia guidelines    Labs per anesthesia: None    Instructed pt that they will be notified the day before their procedure by the GI Lab for time of arrival if their procedure is [place] and Pre-op for [place] cases. Arrival times should be called by 5 pm. If no phone is received the patient should contact their respective hospital. The GI lab telephone number is [phone] and ES Pre-op is [phone].     Follow diet and prep instructions per offices.  Please drink 32 ounces of non-caffeinated clear liquids 2 hours prior to your arrival to avoid dehydration.       Bath or shower the night before and the am of surgery with non-moisturizing soap. No lotions, oils, powders, cologne on skin. No make up, eye make up or jewelry. Wear loose fitting comfortable, clean clothing.     Must have adult present in building the entire time .     Medications for the day of procedure Diltiazem, Esomeprazole (Nexium)    Please hold all vitamins x 7 days prior to procedure and NSAIDS (Aspirin, Excedrin, Goody powders, Motrin, Ibuprofen, Advil, Aleve and Naproxen) x 5 days prior to procedure. Should you have a procedure date that does not allow for the amount of time instructed above, please stop taking vitamins, supplements, and NSAIDS IMMEDIATELY.     The following discharge instructions reviewed with patient: medication given during procedure may cause drowsiness for several hours, therefore, do not drive or operate machinery for remainder of the day. You may not drink alcohol on the day of your procedure, please resume regular diet and activity unless otherwise directed. You may experience abdominal distention for several hours that is relieved by the passage of gas. Contact your physician if you have any of the following: fever or chills, severe abdominal pain or excessive amount of bleeding or a large amount when having a bowel movement.

## 2024-11-21 ENCOUNTER — HOSPITAL ENCOUNTER (OUTPATIENT)
Age: 79
Discharge: HOME | End: 2024-11-21
Attending: INTERNAL MEDICINE | Admitting: INTERNAL MEDICINE
Payer: COMMERCIAL

## 2024-11-21 ENCOUNTER — ANESTHESIA (OUTPATIENT)
Dept: ENDOSCOPY | Age: 79
End: 2024-11-21
Payer: COMMERCIAL

## 2024-11-21 VITALS
TEMPERATURE: 98.5 F | OXYGEN SATURATION: 92 % | WEIGHT: 146.2 LBS | BODY MASS INDEX: 28.7 KG/M2 | HEIGHT: 60 IN | DIASTOLIC BLOOD PRESSURE: 65 MMHG | RESPIRATION RATE: 21 BRPM | SYSTOLIC BLOOD PRESSURE: 112 MMHG | HEART RATE: 88 BPM

## 2024-11-21 PROCEDURE — 3700000000 HC ANESTHESIA ATTENDED CARE: Performed by: INTERNAL MEDICINE

## 2024-11-21 PROCEDURE — 43239 EGD BIOPSY SINGLE/MULTIPLE: CPT | Performed by: INTERNAL MEDICINE

## 2024-11-21 PROCEDURE — 3700000001 HC ADD 15 MINUTES (ANESTHESIA): Performed by: INTERNAL MEDICINE

## 2024-11-21 PROCEDURE — 88305 TISSUE EXAM BY PATHOLOGIST: CPT

## 2024-11-21 PROCEDURE — 7100000011 HC PHASE II RECOVERY - ADDTL 15 MIN: Performed by: INTERNAL MEDICINE

## 2024-11-21 PROCEDURE — C1726 CATH, BAL DIL, NON-VASCULAR: HCPCS | Performed by: INTERNAL MEDICINE

## 2024-11-21 PROCEDURE — 3609017700 HC EGD DILATION GASTRIC/DUODENAL STRICTURE: Performed by: INTERNAL MEDICINE

## 2024-11-21 PROCEDURE — 2500000003 HC RX 250 WO HCPCS: Performed by: NURSE ANESTHETIST, CERTIFIED REGISTERED

## 2024-11-21 PROCEDURE — 6360000002 HC RX W HCPCS: Performed by: NURSE ANESTHETIST, CERTIFIED REGISTERED

## 2024-11-21 PROCEDURE — 43249 ESOPH EGD DILATION <30 MM: CPT | Performed by: INTERNAL MEDICINE

## 2024-11-21 PROCEDURE — 7100000010 HC PHASE II RECOVERY - FIRST 15 MIN: Performed by: INTERNAL MEDICINE

## 2024-11-21 PROCEDURE — 2709999900 HC NON-CHARGEABLE SUPPLY: Performed by: INTERNAL MEDICINE

## 2024-11-21 RX ORDER — LIDOCAINE HYDROCHLORIDE 20 MG/ML
INJECTION, SOLUTION EPIDURAL; INFILTRATION; INTRACAUDAL; PERINEURAL
Status: DISCONTINUED | OUTPATIENT
Start: 2024-11-21 | End: 2024-11-21 | Stop reason: SDUPTHER

## 2024-11-21 RX ORDER — SODIUM CHLORIDE 0.9 % (FLUSH) 0.9 %
5-40 SYRINGE (ML) INJECTION EVERY 12 HOURS SCHEDULED
Status: DISCONTINUED | OUTPATIENT
Start: 2024-11-21 | End: 2024-11-21 | Stop reason: HOSPADM

## 2024-11-21 RX ORDER — PROPOFOL 10 MG/ML
INJECTION, EMULSION INTRAVENOUS
Status: DISCONTINUED | OUTPATIENT
Start: 2024-11-21 | End: 2024-11-21 | Stop reason: SDUPTHER

## 2024-11-21 RX ORDER — ONDANSETRON 2 MG/ML
4 INJECTION INTRAMUSCULAR; INTRAVENOUS
Status: DISCONTINUED | OUTPATIENT
Start: 2024-11-21 | End: 2024-11-21 | Stop reason: HOSPADM

## 2024-11-21 RX ORDER — SODIUM CHLORIDE 0.9 % (FLUSH) 0.9 %
5-40 SYRINGE (ML) INJECTION PRN
Status: DISCONTINUED | OUTPATIENT
Start: 2024-11-21 | End: 2024-11-21 | Stop reason: HOSPADM

## 2024-11-21 RX ORDER — SODIUM CHLORIDE 9 MG/ML
25 INJECTION, SOLUTION INTRAVENOUS PRN
Status: DISCONTINUED | OUTPATIENT
Start: 2024-11-21 | End: 2024-11-21 | Stop reason: HOSPADM

## 2024-11-21 RX ADMIN — PHENYLEPHRINE HYDROCHLORIDE 100 MCG: 0.1 INJECTION, SOLUTION INTRAVENOUS at 13:42

## 2024-11-21 RX ADMIN — PROPOFOL 50 MG: 10 INJECTION, EMULSION INTRAVENOUS at 13:29

## 2024-11-21 RX ADMIN — LIDOCAINE HYDROCHLORIDE 100 MG: 20 INJECTION, SOLUTION EPIDURAL; INFILTRATION; INTRACAUDAL; PERINEURAL at 13:29

## 2024-11-21 RX ADMIN — PROPOFOL 150 MCG/KG/MIN: 10 INJECTION, EMULSION INTRAVENOUS at 13:30

## 2024-11-21 RX ADMIN — PHENYLEPHRINE HYDROCHLORIDE 150 MCG: 0.1 INJECTION, SOLUTION INTRAVENOUS at 13:34

## 2024-11-21 ASSESSMENT — PAIN - FUNCTIONAL ASSESSMENT: PAIN_FUNCTIONAL_ASSESSMENT: 0-10

## 2024-11-21 NOTE — ANESTHESIA POSTPROCEDURE EVALUATION
Department of Anesthesiology  Postprocedure Note    Patient: Magdalene Whittington  MRN: [id number]  YOB: 1945  Date of evaluation: 11/21/2024    Procedure Summary       Date: 11/21/24 [place] [facility] [place] [facility] [place]    Anesthesia Start: 1324 Anesthesia Stop: 1353    Procedure: ESOPHAGOGASTRODUODENOSCOPY DILATION BALLOON / BIOPSY (Upper GI Region) Diagnosis:       Anemia, unspecified type      Hemoglobin drop      Hx of hiatal hernia      Chronic nausea      Gastroesophageal reflux disease, unspecified whether esophagitis present      Family history of colon cancer      (Anemia, unspecified type [D64.9])      (Hemoglobin drop [R71.0])      (Hx of hiatal hernia [Z87.19])      (Chronic nausea [R11.0])      (Gastroesophageal reflux disease, unspecified whether esophagitis present [K21.9])      (Family history of colon cancer [Z80.0])    Surgeons: Santosh Campos MD Responsible Provider: Martin Jules DO    Anesthesia Type: TIVA ASA Status: 4            Anesthesia Type: No value filed.    Chester Phase I:      Chester Phase II: Chester Score: 9    Anesthesia Post Evaluation    Patient location during evaluation: PACU  Level of consciousness: awake and alert  Airway patency: patent  Nausea & Vomiting: no nausea  Cardiovascular status: hemodynamically stable  Respiratory status: acceptable  Hydration status: euvolemic  Pain management: satisfactory to patient    No notable events documented.

## 2024-11-21 NOTE — DISCHARGE INSTRUCTIONS
Gastrointestinal Esophagogastroduodenoscopy (EGD)- Upper Exam Discharge Instructions     1. Call your physician for any problems or questions.  2. Contact the doctor's office for follow up appointment as directed.  3. Medication may cause drowsiness for several hours, therefore, do not drive or operate machinery for remainder of the day.  4. No alcohol today.  5. Do not make any important decisions such as signing legal paperwork.  6. Ordinarily, you may resume regular diet and activity after exam unless otherwise specified by your physician.  7. For mild soreness in your throat you may use Cepacol throat lozenges or warm  salt-water gargles as needed.

## 2024-11-21 NOTE — ANESTHESIA PRE PROCEDURE
Department of Anesthesiology  Preprocedure Note       Name:  Magdalene Whittington   Age:  78 y.o.  :  1945                                          MRN:  [id number]         Date:  2024      Surgeon: Surgeon(s):  Santosh Campos MD    Procedure: Procedure(s):  ESOPHAGOGASTRODUODENOSCOPY    Medications prior to admission:   Prior to Admission medications    Medication Sig Start Date End Date Taking? Authorizing Provider   rivaroxaban (XARELTO) 20 MG TABS tablet Take 1 tablet by mouth daily (with breakfast)   Yes Sergio Broussard MD   furosemide (LASIX) 20 MG tablet Take 1 tablet by mouth 2 times daily 24  Yes Eliceo Lowry DO   ezetimibe (ZETIA) 10 MG tablet Take 1 tablet by mouth daily 10/21/24  Yes Federico Valentine MD   ACCRUFER 30 MG CAPS Take 1 capsule by mouth daily  Patient taking differently: Take 1 capsule by mouth daily IRON 10/21/24  Yes Federico Valentine MD   potassium chloride (KLOR-CON M) 20 MEQ extended release tablet Take 1 tablet by mouth 2 times daily 10/17/24  Yes Eliceo Lowry DO   magnesium oxide (MAG-OX) 400 MG tablet Take 1 tablet by mouth daily 10/17/24  Yes Eliceo Lowry DO   dilTIAZem (CARDIZEM CD) 120 MG extended release capsule Take 1 capsule by mouth daily 10/10/24  Yes Eliceo Lowry DO   lisinopril (PRINIVIL;ZESTRIL) 20 MG tablet Take 1 tablet by mouth 2 times daily  Patient taking differently: Take 1 tablet by mouth every evening 24  Yes Eliceo Lowry DO   albuterol sulfate HFA (VENTOLIN HFA) 108 (90 Base) MCG/ACT inhaler Inhale 2 puffs into the lungs 4 times daily as needed for Wheezing 24  Yes Federico Valentine MD   CALCIUM-MAGNESIUM PO Take 1 tablet by mouth daily   Yes Sergio Broussard MD   esomeprazole (NEXIUM) 40 MG delayed release capsule Take 1 capsule by mouth daily as needed 9/3/20  Yes Automatic Reconciliation, Ar       Current medications:    Current Facility-Administered Medications   Medication

## 2024-11-21 NOTE — H&P
HISTORY AND PHYSICAL             Date: 11/21/2024        Patient Name: Magdalene Whittington     YOB: 1945      Age:  78 y.o.      History of Present Illness   1. Anemia, unspecified type  2. Hemoglobin drop  3. Hx of hiatal hernia  4. Chronic nausea  5. Gastroesophageal reflux disease, unspecified whether esophagitis present       Past Medical History     Past Medical History:   Diagnosis Date    Acute ischemic stroke (HCC) 12/22/2019    Aortic atherosclerosis (HCC)     Arthritis     Ascending aortic aneurysm (HCC)     Atrial fibrillation (HCC)     GERD (gastroesophageal reflux disease)     Hiatal hernia     Hyperlipidemia     Hypertension     Multiple thyroid nodules     Osteopenia     Status post total replacement of right hip     Thyroid nodule incidentally noted on imaging study 12/1/2020    Unilateral primary osteoarthritis, right hip     Vitamin D deficiency         Past Surgical History     Past Surgical History:   Procedure Laterality Date    CARPAL TUNNEL RELEASE Bilateral 2004, 2005    CATARACT REMOVAL Bilateral 07/10/2019    TONSILLECTOMY      TOTAL HIP ARTHROPLASTY Right 2019    TUBAL LIGATION          Medications Prior to Admission     Prior to Admission medications    Medication Sig Start Date End Date Taking? Authorizing Provider   ezetimibe (ZETIA) 10 MG tablet Take 1 tablet by mouth daily 10/21/24  Yes Federico Valentine MD   dilTIAZem (CARDIZEM CD) 120 MG extended release capsule Take 1 capsule by mouth daily 10/10/24  Yes Eliceo Lowry DO   furosemide (LASIX) 20 MG tablet Take 1 tablet by mouth 2 times daily 11/1/24   Eliceo Lowry DO   ACCRUFER 30 MG CAPS Take 1 capsule by mouth daily  Patient taking differently: Take 1 capsule by mouth daily IRON 10/21/24   Federico Valentine MD   potassium chloride (KLOR-CON M) 20 MEQ extended release tablet Take 1 tablet by mouth 2 times daily 10/17/24   Eliceo Lowry DO   magnesium oxide (MAG-OX) 400 MG tablet Take 1 tablet

## 2024-11-22 ENCOUNTER — TELEPHONE (OUTPATIENT)
Age: 79
End: 2024-11-22

## 2024-11-22 NOTE — TELEPHONE ENCOUNTER
Pt.had an endoscopic procedure yesterday and has been really dizzy since the procedure.She has been having some pain in chest when taking a deep breath./77 HR is fine.    I told her she needs to call whomever did her procedure yesterday to discuss her symptoms post procedure.If they feel this is cardiac call us back and we can make a fu appt.If symptoms get worse head to the ER.Pt.v/u.

## 2024-11-22 NOTE — TELEPHONE ENCOUNTER
Patient called stating she has the following concerns :    Chest discomfort  Felt when taking a deep breath  Dizziness  Onset of yesterday  Had an endoscopy yesterday

## 2024-11-25 ENCOUNTER — TELEPHONE (OUTPATIENT)
Dept: INTERNAL MEDICINE CLINIC | Facility: CLINIC | Age: 79
End: 2024-11-25

## 2024-11-25 NOTE — TELEPHONE ENCOUNTER
Post op appointment scheduled 1-26-16 at 330 PM.   Pt called, asks if dom needs to keep her 12/2/ lab appt since she just got labs done w/cardiologist.

## 2024-12-02 ENCOUNTER — LAB (OUTPATIENT)
Dept: INTERNAL MEDICINE CLINIC | Facility: CLINIC | Age: 79
End: 2024-12-02

## 2024-12-02 DIAGNOSIS — E78.00 PURE HYPERCHOLESTEROLEMIA: ICD-10-CM

## 2024-12-02 DIAGNOSIS — I10 PRIMARY HYPERTENSION: ICD-10-CM

## 2024-12-02 LAB
ALBUMIN SERPL-MCNC: 3.5 G/DL (ref 3.2–4.6)
ALBUMIN/GLOB SERPL: 0.9 (ref 1–1.9)
ALP SERPL-CCNC: 87 U/L (ref 35–104)
ALT SERPL-CCNC: 10 U/L (ref 8–45)
ANION GAP SERPL CALC-SCNC: 13 MMOL/L (ref 7–16)
AST SERPL-CCNC: 26 U/L (ref 15–37)
BASOPHILS # BLD: 0.1 K/UL (ref 0–0.2)
BASOPHILS NFR BLD: 1 % (ref 0–2)
BILIRUB SERPL-MCNC: 0.3 MG/DL (ref 0–1.2)
BUN SERPL-MCNC: 10 MG/DL (ref 8–23)
CALCIUM SERPL-MCNC: 9.1 MG/DL (ref 8.8–10.2)
CHLORIDE SERPL-SCNC: 99 MMOL/L (ref 98–107)
CHOLEST SERPL-MCNC: 142 MG/DL (ref 0–200)
CO2 SERPL-SCNC: 29 MMOL/L (ref 20–29)
CREAT SERPL-MCNC: 0.78 MG/DL (ref 0.6–1.1)
DIFFERENTIAL METHOD BLD: ABNORMAL
EOSINOPHIL # BLD: 0.2 K/UL (ref 0–0.8)
EOSINOPHIL NFR BLD: 2 % (ref 0.5–7.8)
ERYTHROCYTE [DISTWIDTH] IN BLOOD BY AUTOMATED COUNT: 23.9 % (ref 11.9–14.6)
GLOBULIN SER CALC-MCNC: 3.9 G/DL (ref 2.3–3.5)
GLUCOSE SERPL-MCNC: 112 MG/DL (ref 70–99)
HCT VFR BLD AUTO: 39.3 % (ref 35.8–46.3)
HDLC SERPL-MCNC: 47 MG/DL (ref 40–60)
HDLC SERPL: 3 (ref 0–5)
HGB BLD-MCNC: 11.8 G/DL (ref 11.7–15.4)
IMM GRANULOCYTES # BLD AUTO: 0 K/UL (ref 0–0.5)
IMM GRANULOCYTES NFR BLD AUTO: 1 % (ref 0–5)
LDLC SERPL CALC-MCNC: 75 MG/DL (ref 0–100)
LYMPHOCYTES # BLD: 1.5 K/UL (ref 0.5–4.6)
LYMPHOCYTES NFR BLD: 19 % (ref 13–44)
MCH RBC QN AUTO: 25.6 PG (ref 26.1–32.9)
MCHC RBC AUTO-ENTMCNC: 30 G/DL (ref 31.4–35)
MCV RBC AUTO: 85.2 FL (ref 82–102)
MONOCYTES # BLD: 0.5 K/UL (ref 0.1–1.3)
MONOCYTES NFR BLD: 6 % (ref 4–12)
NEUTS SEG # BLD: 5.9 K/UL (ref 1.7–8.2)
NEUTS SEG NFR BLD: 72 % (ref 43–78)
NRBC # BLD: 0 K/UL (ref 0–0.2)
PLATELET # BLD AUTO: 332 K/UL (ref 150–450)
PMV BLD AUTO: 10.7 FL (ref 9.4–12.3)
POTASSIUM SERPL-SCNC: 3.3 MMOL/L (ref 3.5–5.1)
PROT SERPL-MCNC: 7.4 G/DL (ref 6.3–8.2)
RBC # BLD AUTO: 4.61 M/UL (ref 4.05–5.2)
SODIUM SERPL-SCNC: 141 MMOL/L (ref 136–145)
TRIGL SERPL-MCNC: 100 MG/DL (ref 0–150)
VLDLC SERPL CALC-MCNC: 20 MG/DL (ref 6–23)
WBC # BLD AUTO: 8.1 K/UL (ref 4.3–11.1)

## 2024-12-04 ENCOUNTER — TELEPHONE (OUTPATIENT)
Age: 79
End: 2024-12-04

## 2024-12-04 DIAGNOSIS — R06.02 SOB (SHORTNESS OF BREATH): ICD-10-CM

## 2024-12-04 DIAGNOSIS — I10 HYPERTENSION: Primary | ICD-10-CM

## 2024-12-04 NOTE — TELEPHONE ENCOUNTER
Take lasix 40 in the AM.  Try this, see if the edema gets better.  Next week, let's get CMP, mag BNP.  Call if worsening.   Thanks

## 2024-12-04 NOTE — TELEPHONE ENCOUNTER
Pt.notified of 's response and v/u.Lab orders placed as below:  Orders Placed This Encounter   Procedures    Comprehensive Metabolic Panel     Standing Status:   Future     Standing Expiration Date:   12/4/2025    Magnesium     Standing Status:   Future     Standing Expiration Date:   12/4/2025    Brain Natriuretic Peptide     Standing Status:   Future     Standing Expiration Date:   12/4/2025

## 2024-12-04 NOTE — TELEPHONE ENCOUNTER
Pt.is calling reporting edema BLE's left worse than right.She denies any SOB.Has not been weighing daily.She had traditional Thanksgiving meal.BP seems good.Swelling does go down just a little at night.I did educate on need for weighing daily.She says she will start doing this.She has been taking Lasix 20mg bid but has not been taking her potassium because of the pill size.Should Lasix be increased?Does she need lab work since she has not been taking her potassium?

## 2024-12-10 ENCOUNTER — OFFICE VISIT (OUTPATIENT)
Age: 79
End: 2024-12-10
Payer: COMMERCIAL

## 2024-12-10 ENCOUNTER — TELEPHONE (OUTPATIENT)
Age: 79
End: 2024-12-10

## 2024-12-10 VITALS
WEIGHT: 149 LBS | SYSTOLIC BLOOD PRESSURE: 128 MMHG | HEIGHT: 60 IN | HEART RATE: 80 BPM | DIASTOLIC BLOOD PRESSURE: 78 MMHG | BODY MASS INDEX: 29.25 KG/M2

## 2024-12-10 DIAGNOSIS — I71.21 ANEURYSM OF ASCENDING AORTA WITHOUT RUPTURE (HCC): ICD-10-CM

## 2024-12-10 DIAGNOSIS — I50.32 CHRONIC DIASTOLIC CONGESTIVE HEART FAILURE (HCC): Primary | ICD-10-CM

## 2024-12-10 DIAGNOSIS — I48.0 PAROXYSMAL ATRIAL FIBRILLATION (HCC): ICD-10-CM

## 2024-12-10 DIAGNOSIS — I10 PRIMARY HYPERTENSION: ICD-10-CM

## 2024-12-10 PROCEDURE — 3078F DIAST BP <80 MM HG: CPT | Performed by: INTERNAL MEDICINE

## 2024-12-10 PROCEDURE — 1123F ACP DISCUSS/DSCN MKR DOCD: CPT | Performed by: INTERNAL MEDICINE

## 2024-12-10 PROCEDURE — 3074F SYST BP LT 130 MM HG: CPT | Performed by: INTERNAL MEDICINE

## 2024-12-10 PROCEDURE — 99214 OFFICE O/P EST MOD 30 MIN: CPT | Performed by: INTERNAL MEDICINE

## 2024-12-10 PROCEDURE — 1126F AMNT PAIN NOTED NONE PRSNT: CPT | Performed by: INTERNAL MEDICINE

## 2024-12-10 RX ORDER — TORSEMIDE 20 MG/1
20 TABLET ORAL DAILY
Qty: 90 TABLET | Refills: 3 | Status: SHIPPED | OUTPATIENT
Start: 2024-12-10

## 2024-12-10 RX ORDER — POTASSIUM CHLORIDE 20 MEQ/15ML
20 SOLUTION ORAL 2 TIMES DAILY
Qty: 900 ML | Refills: 3 | Status: SHIPPED | OUTPATIENT
Start: 2024-12-10

## 2024-12-10 NOTE — PROGRESS NOTES
Component Value Date/Time     12/02/2024 09:11 AM    K 3.3 12/02/2024 09:11 AM    CL 99 12/02/2024 09:11 AM    CO2 29 12/02/2024 09:11 AM    BUN 10 12/02/2024 09:11 AM    CREATININE 0.78 12/02/2024 09:11 AM    GLUCOSE 112 12/02/2024 09:11 AM    CALCIUM 9.1 12/02/2024 09:11 AM        Lab Results   Component Value Date    WBC 8.1 12/02/2024    HGB 11.8 12/02/2024    HCT 39.3 12/02/2024    MCV 85.2 12/02/2024     12/02/2024       Lab Results   Component Value Date    TSH 1.710 10/16/2024       Lab Results   Component Value Date    LABA1C 5.8 (H) 05/11/2023     Lab Results   Component Value Date     05/11/2023       Lab Results   Component Value Date    CHOL 142 12/02/2024    CHOL 167 05/29/2024    CHOL 149 11/21/2023     Lab Results   Component Value Date    TRIG 100 12/02/2024    TRIG 111 05/29/2024    TRIG 70 11/21/2023     Lab Results   Component Value Date    HDL 47 12/02/2024    HDL 57 05/29/2024    HDL 52 11/21/2023     No components found for: \"LDLCHOLESTEROL\", \"LDLCALC\"  Lab Results   Component Value Date    VLDL 20 12/02/2024    VLDL 22 05/29/2024    VLDL 14 11/21/2023     Lab Results   Component Value Date    CHOLHDLRATIO 3.0 12/02/2024    CHOLHDLRATIO 2.9 05/29/2024    CHOLHDLRATIO 2.9 11/21/2023     Lab Results   Component Value Date    LDL 75 12/02/2024 08/24/23    ECHO (TTE) COMPLETE (CONTRAST/BUBBLE/3D PRN) 08/27/2023  8:53 PM (Final)    Interpretation Summary    Left Ventricle: Normal left ventricular systolic function with a visually estimated EF of 55 - 60%. Left ventricle size is normal. Mildly increased wall thickness. Normal wall motion. Abnormal diastolic function.    Aortic Valve: Mild to moderate regurgitation.    Mitral Valve: Mild regurgitation.    Tricuspid Valve: The estimated RVSP is 36 mmHg.    Left Atrium: Left atrium is severely dilated.    Signed by: Eliceo Lowry DO on 8/27/2023  8:53 PM        I have Independently reviewed prior care notes, any ER

## 2024-12-10 NOTE — TELEPHONE ENCOUNTER
Her feet are so swollen she can barely walk w/out pain.This started last week.She has been taking Lasix 40mg qday.She is more SOB.She has not gained any wt.She has not done her labs says that she had labs w/PCP recently and no need to repeat her labs.She was very upset about her condition/ upset about her swollen feet.Please advise..    Current Outpatient Medications on File Prior to Visit   Medication Sig Dispense Refill    rivaroxaban (XARELTO) 20 MG TABS tablet Take 1 tablet by mouth daily (with breakfast)      furosemide (LASIX) 20 MG tablet Take 1 tablet by mouth 2 times daily (Patient taking differently: Take 2 tablets by mouth daily) 180 tablet 3    ezetimibe (ZETIA) 10 MG tablet Take 1 tablet by mouth daily 90 tablet 1    ACCRUFER 30 MG CAPS Take 1 capsule by mouth daily (Patient taking differently: Take 1 capsule by mouth daily IRON) 30 capsule 0    potassium chloride (KLOR-CON M) 20 MEQ extended release tablet Take 1 tablet by mouth 2 times daily 180 tablet 1    magnesium oxide (MAG-OX) 400 MG tablet Take 1 tablet by mouth daily 30 tablet 1    dilTIAZem (CARDIZEM CD) 120 MG extended release capsule Take 1 capsule by mouth daily 30 capsule 11    lisinopril (PRINIVIL;ZESTRIL) 20 MG tablet Take 1 tablet by mouth 2 times daily (Patient taking differently: Take 1 tablet by mouth every evening) 180 tablet 1    albuterol sulfate HFA (VENTOLIN HFA) 108 (90 Base) MCG/ACT inhaler Inhale 2 puffs into the lungs 4 times daily as needed for Wheezing 18 g 5    CALCIUM-MAGNESIUM PO Take 1 tablet by mouth daily      esomeprazole (NEXIUM) 40 MG delayed release capsule Take 1 capsule by mouth daily as needed       No current facility-administered medications on file prior to visit.

## 2024-12-30 DIAGNOSIS — I50.32 CHRONIC DIASTOLIC CONGESTIVE HEART FAILURE (HCC): ICD-10-CM

## 2024-12-30 LAB
ANION GAP SERPL CALC-SCNC: 14 MMOL/L (ref 7–16)
BUN SERPL-MCNC: 25 MG/DL (ref 8–23)
CALCIUM SERPL-MCNC: 9.4 MG/DL (ref 8.8–10.2)
CHLORIDE SERPL-SCNC: 96 MMOL/L (ref 98–107)
CO2 SERPL-SCNC: 27 MMOL/L (ref 20–29)
CREAT SERPL-MCNC: 1.34 MG/DL (ref 0.6–1.1)
GLUCOSE SERPL-MCNC: 120 MG/DL (ref 70–99)
MAGNESIUM SERPL-MCNC: 1.4 MG/DL (ref 1.8–2.4)
NT PRO BNP: 357 PG/ML (ref 0–450)
POTASSIUM SERPL-SCNC: 4.3 MMOL/L (ref 3.5–5.1)
SODIUM SERPL-SCNC: 137 MMOL/L (ref 136–145)

## 2025-01-06 ENCOUNTER — TELEPHONE (OUTPATIENT)
Age: 80
End: 2025-01-06

## 2025-01-06 NOTE — TELEPHONE ENCOUNTER
,  She is much better on Torsemide,feeling better,less SOB.She has Mag-ox on hand and resumed it this past week.Overall she is much better.

## 2025-01-06 NOTE — TELEPHONE ENCOUNTER
----- Message from Dr. Eliceo Lowry, DO sent at 1/5/2025  3:36 PM EST -----  Please call her, does she feel better on the torsemide?  Better than lasix??  OG better?  Labs are good overall, needs to drink some water with the torsemide.  Remain on K.    Needs to add mag oxide 400 daily.   Thanks

## 2025-01-21 NOTE — PROGRESS NOTES
Shift assessment completed via doc flow sheet. Alert and oriented x 4. No acute distress noted at this time. NIH performed with rt visual deficit noted. All safety measures in place. Call light within reach. none

## 2025-02-02 SDOH — ECONOMIC STABILITY: INCOME INSECURITY: IN THE LAST 12 MONTHS, WAS THERE A TIME WHEN YOU WERE NOT ABLE TO PAY THE MORTGAGE OR RENT ON TIME?: PATIENT DECLINED

## 2025-02-02 SDOH — ECONOMIC STABILITY: TRANSPORTATION INSECURITY
IN THE PAST 12 MONTHS, HAS THE LACK OF TRANSPORTATION KEPT YOU FROM MEDICAL APPOINTMENTS OR FROM GETTING MEDICATIONS?: PATIENT DECLINED

## 2025-02-02 SDOH — ECONOMIC STABILITY: TRANSPORTATION INSECURITY
IN THE PAST 12 MONTHS, HAS LACK OF TRANSPORTATION KEPT YOU FROM MEETINGS, WORK, OR FROM GETTING THINGS NEEDED FOR DAILY LIVING?: PATIENT DECLINED

## 2025-02-02 SDOH — ECONOMIC STABILITY: FOOD INSECURITY: WITHIN THE PAST 12 MONTHS, THE FOOD YOU BOUGHT JUST DIDN'T LAST AND YOU DIDN'T HAVE MONEY TO GET MORE.: PATIENT DECLINED

## 2025-02-02 SDOH — ECONOMIC STABILITY: FOOD INSECURITY: WITHIN THE PAST 12 MONTHS, YOU WORRIED THAT YOUR FOOD WOULD RUN OUT BEFORE YOU GOT MONEY TO BUY MORE.: PATIENT DECLINED

## 2025-02-02 ASSESSMENT — PATIENT HEALTH QUESTIONNAIRE - PHQ9
SUM OF ALL RESPONSES TO PHQ9 QUESTIONS 1 & 2: 1
SUM OF ALL RESPONSES TO PHQ QUESTIONS 1-9: 1
SUM OF ALL RESPONSES TO PHQ9 QUESTIONS 1 & 2: 1
SUM OF ALL RESPONSES TO PHQ QUESTIONS 1-9: 1
2. FEELING DOWN, DEPRESSED OR HOPELESS: SEVERAL DAYS
SUM OF ALL RESPONSES TO PHQ QUESTIONS 1-9: 1
1. LITTLE INTEREST OR PLEASURE IN DOING THINGS: NOT AT ALL
1. LITTLE INTEREST OR PLEASURE IN DOING THINGS: NOT AT ALL
2. FEELING DOWN, DEPRESSED OR HOPELESS: SEVERAL DAYS
SUM OF ALL RESPONSES TO PHQ QUESTIONS 1-9: 1

## 2025-02-03 ENCOUNTER — OFFICE VISIT (OUTPATIENT)
Dept: INTERNAL MEDICINE CLINIC | Facility: CLINIC | Age: 80
End: 2025-02-03
Payer: COMMERCIAL

## 2025-02-03 VITALS
HEART RATE: 62 BPM | TEMPERATURE: 97.5 F | BODY MASS INDEX: 28.47 KG/M2 | HEIGHT: 60 IN | OXYGEN SATURATION: 96 % | DIASTOLIC BLOOD PRESSURE: 76 MMHG | SYSTOLIC BLOOD PRESSURE: 120 MMHG | WEIGHT: 145 LBS

## 2025-02-03 DIAGNOSIS — I10 PRIMARY HYPERTENSION: ICD-10-CM

## 2025-02-03 DIAGNOSIS — D64.9 SYMPTOMATIC ANEMIA: Primary | ICD-10-CM

## 2025-02-03 DIAGNOSIS — H81.10 BENIGN PAROXYSMAL POSITIONAL VERTIGO, UNSPECIFIED LATERALITY: ICD-10-CM

## 2025-02-03 DIAGNOSIS — E78.00 PURE HYPERCHOLESTEROLEMIA: ICD-10-CM

## 2025-02-03 DIAGNOSIS — Z86.73 HISTORY OF STROKE: ICD-10-CM

## 2025-02-03 DIAGNOSIS — I50.32 CHRONIC DIASTOLIC CONGESTIVE HEART FAILURE (HCC): ICD-10-CM

## 2025-02-03 DIAGNOSIS — J44.9 STAGE 1 MILD COPD BY GOLD CLASSIFICATION (HCC): ICD-10-CM

## 2025-02-03 DIAGNOSIS — I48.0 PAROXYSMAL ATRIAL FIBRILLATION (HCC): ICD-10-CM

## 2025-02-03 LAB
ANION GAP SERPL CALC-SCNC: 10 MMOL/L (ref 7–16)
BUN SERPL-MCNC: 12 MG/DL (ref 8–23)
CALCIUM SERPL-MCNC: 9.6 MG/DL (ref 8.8–10.2)
CHLORIDE SERPL-SCNC: 103 MMOL/L (ref 98–107)
CO2 SERPL-SCNC: 31 MMOL/L (ref 20–29)
CREAT SERPL-MCNC: 0.76 MG/DL (ref 0.6–1.1)
ERYTHROCYTE [DISTWIDTH] IN BLOOD BY AUTOMATED COUNT: 15.5 % (ref 11.9–14.6)
FERRITIN SERPL-MCNC: 24 NG/ML (ref 8–388)
GLUCOSE SERPL-MCNC: 98 MG/DL (ref 70–99)
HCT VFR BLD AUTO: 37.6 % (ref 35.8–46.3)
HGB BLD-MCNC: 12.3 G/DL (ref 11.7–15.4)
HGB RETIC QN AUTO: 32 PG (ref 29–35)
IMM RETICS NFR: 11.1 % (ref 3–15.9)
IRON SATN MFR SERPL: 8 % (ref 20–50)
IRON SERPL-MCNC: 29 UG/DL (ref 35–100)
MCH RBC QN AUTO: 29.7 PG (ref 26.1–32.9)
MCHC RBC AUTO-ENTMCNC: 32.7 G/DL (ref 31.4–35)
MCV RBC AUTO: 90.8 FL (ref 82–102)
NRBC # BLD: 0 K/UL (ref 0–0.2)
PLATELET # BLD AUTO: 304 K/UL (ref 150–450)
PMV BLD AUTO: 11.5 FL (ref 9.4–12.3)
POTASSIUM SERPL-SCNC: 4.3 MMOL/L (ref 3.5–5.1)
RBC # BLD AUTO: 4.14 M/UL (ref 4.05–5.2)
RETICS # AUTO: 0.07 M/UL (ref 0.03–0.1)
RETICS/RBC NFR AUTO: 1.8 % (ref 0.3–2)
SODIUM SERPL-SCNC: 144 MMOL/L (ref 136–145)
TIBC SERPL-MCNC: 370 UG/DL (ref 240–450)
UIBC SERPL-MCNC: 341 UG/DL (ref 112–347)
WBC # BLD AUTO: 8.3 K/UL (ref 4.3–11.1)

## 2025-02-03 PROCEDURE — 3078F DIAST BP <80 MM HG: CPT | Performed by: INTERNAL MEDICINE

## 2025-02-03 PROCEDURE — G2211 COMPLEX E/M VISIT ADD ON: HCPCS | Performed by: INTERNAL MEDICINE

## 2025-02-03 PROCEDURE — 1123F ACP DISCUSS/DSCN MKR DOCD: CPT | Performed by: INTERNAL MEDICINE

## 2025-02-03 PROCEDURE — 1126F AMNT PAIN NOTED NONE PRSNT: CPT | Performed by: INTERNAL MEDICINE

## 2025-02-03 PROCEDURE — 1160F RVW MEDS BY RX/DR IN RCRD: CPT | Performed by: INTERNAL MEDICINE

## 2025-02-03 PROCEDURE — 3074F SYST BP LT 130 MM HG: CPT | Performed by: INTERNAL MEDICINE

## 2025-02-03 PROCEDURE — 99214 OFFICE O/P EST MOD 30 MIN: CPT | Performed by: INTERNAL MEDICINE

## 2025-02-03 PROCEDURE — 1159F MED LIST DOCD IN RCRD: CPT | Performed by: INTERNAL MEDICINE

## 2025-02-03 RX ORDER — EZETIMIBE 10 MG/1
10 TABLET ORAL DAILY
Qty: 90 TABLET | Refills: 1 | Status: SHIPPED | OUTPATIENT
Start: 2025-02-03

## 2025-02-03 ASSESSMENT — ENCOUNTER SYMPTOMS: SHORTNESS OF BREATH: 1

## 2025-02-03 NOTE — ASSESSMENT & PLAN NOTE
Chronic, at goal (stable), continue current treatment plan    Lab Results   Component Value Date    CHOL 142 12/02/2024    TRIG 100 12/02/2024    HDL 47 12/02/2024    LDL 75 12/02/2024    VLDL 20 12/02/2024    CHOLHDLRATIO 3.0 12/02/2024     Lab Results   Component Value Date    ALT 10 12/02/2024    AST 26 12/02/2024    ALKPHOS 87 12/02/2024    BILITOT 0.3 12/02/2024     Treatment regimen is effective.       Lipid Lowering Medications       Intestinal Cholesterol Absorption Inhibitors       ezetimibe (ZETIA) 10 MG tablet Take 1 tablet by mouth daily          Literature reviewed and considered in regards to LDL management and treatment with statin medications; considering current age, comorbid medical conditions; considering the risks vs benefits    Alexis RN, Joshua ROCK, Keshawn OG, Sean QUIROGA, Tiarra DG, Helena CC. Treatment with Statins in Elderly Patients. [facility] ([facility]). 2019 Oct 30;55(11):721.    Lupe K, Minh SH. Effects of Statins for Primary Prevention in the Elderly: Recent Evidence. J Lipid Atheroscler. 2020 Jan;9(1):1-7.    Mary MG, Abdjulesah A, Santosh MB, Rosario AM. Primary prevention statin therapy in older adults. Curr Opin Cardiol. 2023 Jan 1;38(1):11-20

## 2025-02-03 NOTE — PROGRESS NOTES
ASSESSMENT/PLAN:    1. Symptomatic anemia  Assessment & Plan:   Her hemoglobin levels have been observed to decrease, potentially due to her anticoagulant therapy. She reports persistent fatigue and weakness, which worsened after moving to a smaller apartment. A comprehensive blood workup will be conducted today to assess her hemoglobin and iron levels. She has been taking iron supplement gummies (18 mg each) but reports no significant improvement. If her hemoglobin levels remain low without an identifiable cause, a referral to a hematologist will be considered.    Lab Results   Component Value Date    WBC 8.1 12/02/2024    HGB 11.8 12/02/2024    HCT 39.3 12/02/2024    MCV 85.2 12/02/2024     12/02/2024     Lab Results   Component Value Date    IRON 85 11/13/2024    TIBC 427 11/13/2024    FERRITIN 39 11/13/2024       Orders:  -     CBC  -     Iron and TIBC  -     Basic Metabolic Panel  -     Reticulocytes  -     Ferritin  2. Pure hypercholesterolemia  Assessment & Plan:   Chronic, at goal (stable), continue current treatment plan    Lab Results   Component Value Date    CHOL 142 12/02/2024    TRIG 100 12/02/2024    HDL 47 12/02/2024    LDL 75 12/02/2024    VLDL 20 12/02/2024    CHOLHDLRATIO 3.0 12/02/2024     Lab Results   Component Value Date    ALT 10 12/02/2024    AST 26 12/02/2024    ALKPHOS 87 12/02/2024    BILITOT 0.3 12/02/2024     Treatment regimen is effective.       Lipid Lowering Medications       Intestinal Cholesterol Absorption Inhibitors       ezetimibe (ZETIA) 10 MG tablet Take 1 tablet by mouth daily          Literature reviewed and considered in regards to LDL management and treatment with statin medications; considering current age, comorbid medical conditions; considering the risks vs benefits    Alexis MONTERO, Joshua ROCK, Keshawn OG, Sean Hathaway A, Tiarra DG, Diaelu CC. Treatment with Statins in Elderly Patients. [employer] ([facility]). 2019 Oct 30;55(11):721.    Lupe ELLIS, Minh SH.

## 2025-02-03 NOTE — ASSESSMENT & PLAN NOTE
Chronic, at goal (stable), continue current treatment plan    Lab Results   Component Value Date/Time     12/30/2024 10:35 AM    K 4.3 12/30/2024 10:35 AM    CL 96 12/30/2024 10:35 AM    CO2 27 12/30/2024 10:35 AM    BUN 25 12/30/2024 10:35 AM    CREATININE 1.34 12/30/2024 10:35 AM    GLUCOSE 120 12/30/2024 10:35 AM    CALCIUM 9.4 12/30/2024 10:35 AM    LABGLOM 40 12/30/2024 10:35 AM    LABGLOM >60 11/21/2023 07:45 AM    LABGLOM 89 05/04/2022 11:57 AM      Hypertension Medications       ACE Inhibitors       lisinopril (PRINIVIL;ZESTRIL) 20 MG tablet Take 1 tablet by mouth 2 times daily     Patient taking differently: Take 1 tablet by mouth every evening       Calcium Channel Blockers       dilTIAZem (CARDIZEM CD) 120 MG extended release capsule Take 1 capsule by mouth daily       Loop Diuretics       torsemide (DEMADEX) 20 MG tablet Take 1 tablet by mouth daily

## 2025-02-03 NOTE — ASSESSMENT & PLAN NOTE
She reports worsening vertigo, which can be triggered by turning to the right. Physical therapy, including Epley maneuvers, has been recommended. She has been using meclizine, which provides some relief but not significantly.

## 2025-02-03 NOTE — ASSESSMENT & PLAN NOTE
Her hemoglobin levels have been observed to decrease, potentially due to her anticoagulant therapy. She reports persistent fatigue and weakness, which worsened after moving to a smaller apartment. A comprehensive blood workup will be conducted today to assess her hemoglobin and iron levels. She has been taking iron supplement gummies (18 mg each) but reports no significant improvement. If her hemoglobin levels remain low without an identifiable cause, a referral to a hematologist will be considered.    Lab Results   Component Value Date    WBC 8.1 12/02/2024    HGB 11.8 12/02/2024    HCT 39.3 12/02/2024    MCV 85.2 12/02/2024     12/02/2024     Lab Results   Component Value Date    IRON 85 11/13/2024    TIBC 427 11/13/2024    FERRITIN 39 11/13/2024

## 2025-02-03 NOTE — ASSESSMENT & PLAN NOTE
Monitored by specialist- no acute findings meriting change in the plan    Lab Results   Component Value Date    WBC 8.1 12/02/2024    HGB 11.8 12/02/2024    HCT 39.3 12/02/2024    MCV 85.2 12/02/2024     12/02/2024     Afib: Doing well since last visit without chest pain or reported angina symptoms or CHF, palpitations, edema, presyncope or syncope.  Vitals controlled and tolerating meds well. Staying active without any significant limitations.   [id number] Score for Atrial Fibrillation Stroke Risk   Risk   Factors  Component Value   C CHF Yes 1   H HTN Yes 1   A2 Age >= 75 Yes,  (79 y.o.) 2   D DM No 0   S2 Prior Stroke/TIA Yes 2   V Vascular Disease No 0   A Age 65-74 No,  (79 y.o.) 0   Sc Sex female 1    [id number]  Score  7   Score last updated 2/3/25 2:21 PM EST    Click here for a link to the UpToDate guideline \"Atrial Fibrillation: Anticoagulation therapy to prevent embolization    Disclaimer: Risk Score calculation is dependent on accuracy of patient problem list and past encounter diagnosis.

## 2025-02-03 NOTE — ASSESSMENT & PLAN NOTE
Monitored by specialist- no acute findings meriting change in the plan    Cardiac Medications       ACE Inhibitors       lisinopril (PRINIVIL;ZESTRIL) 20 MG tablet Take 1 tablet by mouth 2 times daily     Patient taking differently: Take 1 tablet by mouth every evening       Calcium Channel Blockers       dilTIAZem (CARDIZEM CD) 120 MG extended release capsule Take 1 capsule by mouth daily       Loop Diuretics       torsemide (DEMADEX) 20 MG tablet Take 1 tablet by mouth daily       Intestinal Cholesterol Absorption Inhibitors       ezetimibe (ZETIA) 10 MG tablet Take 1 tablet by mouth daily       Direct Factor Xa Inhibitors       rivaroxaban (XARELTO) 20 MG TABS tablet Take 1 tablet by mouth daily (with breakfast)     Patient not taking: Reported on 2/3/2025

## 2025-02-03 NOTE — ASSESSMENT & PLAN NOTE
Becoming symptomatic?  Spiriva considered.   She uses albuterol inhaler twice a day but reports no significant difference in her symptoms. A trial of Spiriva inhaler will be considered to help with fatigue and shortness of breath. She can continue using albuterol as needed.

## 2025-02-04 NOTE — RESULT ENCOUNTER NOTE
I was pleased with your hemoglobin levels at this time.  It was normal at 12.3.  I would recommend you continue your current over the counter iron supplementation at this time.  I do not feel hematology consultation is needed at this time.  I will update your cardiologist of these results.      Sincerely,  ARNALDO MYERS MD

## 2025-02-13 ENCOUNTER — HOSPITAL ENCOUNTER (OUTPATIENT)
Dept: PHYSICAL THERAPY | Age: 80
Setting detail: RECURRING SERIES
Discharge: HOME | End: 2025-02-16
Attending: INTERNAL MEDICINE
Payer: COMMERCIAL

## 2025-02-13 DIAGNOSIS — R26.2 DIFFICULTY IN WALKING, NOT ELSEWHERE CLASSIFIED: ICD-10-CM

## 2025-02-13 DIAGNOSIS — H81.13 BENIGN PAROXYSMAL VERTIGO, BILATERAL: Primary | ICD-10-CM

## 2025-02-13 PROCEDURE — 97112 NEUROMUSCULAR REEDUCATION: CPT

## 2025-02-13 PROCEDURE — 97162 PT EVAL MOD COMPLEX 30 MIN: CPT

## 2025-02-13 ASSESSMENT — PAIN SCALES - GENERAL: PAINLEVEL_OUTOF10: 0

## 2025-02-13 NOTE — THERAPY EVALUATION
Expiration Date: 05/14/25     Frequency/Duration: Plan Frequency: Two times a week for 90 days      Interventions Planned (Treatment may consist of any combination of the following):    Balance Training, Gait Training, Home Exercise Program (HEP), Therapeutic Exercise/Strengthening, and Vestibular Rehab   Goals: (Goals have been discussed and agreed upon with patient.)  Short-Term Functional Goals: Time Frame: 30 days   Patient will be compliant with movement restrictions to help resolve BPPV.  Discharge Goals: Time Frame: 90 days   Patient will report no vertigo when turning to the right or rolling to the right indicating resolution of BPPV.  Patient will increase score on Dynamic Gait Index greater than or equal to 21/24 demonstrating improved balance and decreased risk of falls with daily activities.   Patient will decrease score on Dizziness Handicap Inventory less than or equal to 15 demonstrating improvement.          Medical Necessity:   > Patient is expected to demonstrate progress in balance and vertigo to improve safety during activities of daily living.  Reason For Services/Other Comments:  > Patient continues to require skilled intervention due to vertigo interfering with activities of daily living.      Regarding Magdalene Whittington's therapy, I certify that the treatment plan above will be carried out by a therapist or under their direction.  Thank you for this referral,  LUCIA MAYORGA, PT     Referring Physician Signature: Federico Valentine,* _______________________________ Date _____________        Charge Capture  Events  Appt Desk  Attendance Report          negative...

## 2025-02-13 NOTE — PROGRESS NOTES
Magdalene Whittington  : 1945  Primary: [employer] (Medicare Managed)  Secondary:  [facility] @ [address]  [facility] [address]  Phone: [phone]  Fax: [phone] Plan Frequency: Two times a week for 90 days  Plan of Care/Certification Expiration Date: 25        Plan of Care/Certification Expiration Date:  Plan of Care/Certification Expiration Date: 25    Frequency/Duration: Plan Frequency: Two times a week for 90 days      Time In/Out:   Time In: 1300  Time Out: 1342      PT Visit Info:    Progress Note Due Date: 03/15/25  Total # of Visits to Date: 1  Progress Note Counter: 1      Visit Count:  1    OUTPATIENT PHYSICAL THERAPY:   Treatment Note 2025       Episode  (PT-Dizziness)               Treatment Diagnosis:    Benign paroxysmal vertigo, bilateral  Difficulty in walking, not elsewhere classified  Medical/Referring Diagnosis:    Benign paroxysmal positional vertigo, unspecified laterality    Referring Physician:  Federico Valentine MD MD Orders:  PT Eval and Treat   Return MD Appt:  2025  Date of Onset:  Onset Date:  (Chronic)  Allergies:   Atorvastatin  Restrictions/Precautions:   None      Interventions Planned (Treatment may consist of any combination of the following):     See Assessment Note    Subjective Comments:   Patient complains of vertigo with head and body movements.   Initial Pain Level:     0/10  Post Session Pain Level:      0/10  Medications Last Reviewed: 2025  Updated Objective Findings:  See Evaluation Note from today  Treatment   NEUROMUSCULAR RE-EDUCATION: (25 minutes):    Exercise/activities per grid below to improve balance and vertigo .  Required minimal verbal cues to  promote correct body alignment .   Date:  2025 Date:   Date:     Activity/Exercise Parameters Parameters Parameters   [place] Hallpike test Right positive   Left positive     Epley maneuver  Right X 3 reps

## 2025-02-18 ENCOUNTER — HOSPITAL ENCOUNTER (OUTPATIENT)
Dept: PHYSICAL THERAPY | Age: 80
Setting detail: RECURRING SERIES
Discharge: HOME | End: 2025-02-21
Attending: INTERNAL MEDICINE
Payer: COMMERCIAL

## 2025-02-18 PROCEDURE — 97112 NEUROMUSCULAR REEDUCATION: CPT

## 2025-02-18 ASSESSMENT — PAIN SCALES - GENERAL: PAINLEVEL_OUTOF10: 0

## 2025-02-18 NOTE — PROGRESS NOTES
Magdalene Whittington  : 1945  Primary: [employer] (Medicare Managed)  Secondary:  [facility] @ [address]  [facility] [address]  Phone: [phone]  Fax: [phone] Plan Frequency: Two times a week for 90 days  Plan of Care/Certification Expiration Date: 25        Plan of Care/Certification Expiration Date:  Plan of Care/Certification Expiration Date: 25    Frequency/Duration: Plan Frequency: Two times a week for 90 days      Time In/Out:   Time In: 1200  Time Out: 1222      PT Visit Info:    Progress Note Due Date: 03/15/25  Total # of Visits to Date: 2  Progress Note Counter: 2      Visit Count:  2    OUTPATIENT PHYSICAL THERAPY:   Treatment Note 2025       Episode  (PT-Dizziness)               Treatment Diagnosis:    Benign paroxysmal vertigo, bilateral  Difficulty in walking, not elsewhere classified  Medical/Referring Diagnosis:    Benign paroxysmal positional vertigo, unspecified laterality    Referring Physician:  Federico Valentine MD MD Orders:  PT Eval and Treat   Return MD Appt:  2025  Date of Onset:  Onset Date:  (Chronic)  Allergies:   Atorvastatin  Restrictions/Precautions:   None      Interventions Planned (Treatment may consist of any combination of the following):     See Assessment Note    Subjective Comments:   Patient reports she is better.  \"I can turn to the right without getting dizzy\".     Initial Pain Level:     0/10  Post Session Pain Level:      0/10  Medications Last Reviewed: 2025  Updated Objective Findings:  None Today  Treatment   NEUROMUSCULAR RE-EDUCATION: (22 minutes):    Exercise/activities per grid below to improve balance and vertigo .  Required minimal verbal cues to  promote correct body alignment .   Date:  2025 Date:  2025 Date:     Activity/Exercise Parameters Parameters Parameters   [place] Hallpike test Right positive   Left positive Right negative  Left positive    Epley

## 2025-02-21 ENCOUNTER — HOSPITAL ENCOUNTER (OUTPATIENT)
Dept: PHYSICAL THERAPY | Age: 80
Setting detail: RECURRING SERIES
Discharge: HOME | End: 2025-02-24
Attending: INTERNAL MEDICINE
Payer: COMMERCIAL

## 2025-02-21 PROCEDURE — 97112 NEUROMUSCULAR REEDUCATION: CPT

## 2025-02-21 ASSESSMENT — PAIN SCALES - GENERAL: PAINLEVEL_OUTOF10: 0

## 2025-02-21 NOTE — PROGRESS NOTES
Date:  2/21/2025   Activity/Exercise Parameters Parameters Parameters   [place] Hallpike test Right positive   Left positive Right negative  Left positive Right negative  Left negative   Epley maneuver  Right X 3 reps Left x 4 reps X   Walking with horizontal head turns   4 laps   Walking with vertical head turns   4 laps                         Treatment/Session Summary:    Treatment Assessment:   Bilateral BPPV resolved with treatment.  Patient demonstrates improved balance and stability since beginning therapy.     Communication/Consultation:  None today  Equipment provided today:  None  Recommendations/Intent for next treatment session: Next visit will focus on will recheck left BPPV.  Chart will be kept open for a month in case vertigo returns and patient needs to come back to therapy.   Patient will be discharged after a month if she does not schedule additional appointments.   Patient is agreeable with this plan.    >Total Treatment Billable Duration:  15 minutes   Time In: 1515  Time Out: 1530     LINDA MAYORGA PT         Charge Capture  Events  Dtime Portal  Appt Desk  Attendance Report     Future Appointments   Date Time Provider Department Center   2/25/2025 12:00 PM Linda Mayorga, PT [facility]   2/28/2025  2:30 PM Linda Mayorga, PT [facility]   3/3/2025  9:45 AM Eliceo Lowry DO [facility]   3/4/2025  2:30 PM Linda Mayorga, PT [facility]   3/7/2025  2:30 PM Linda Mayorga, PT [facility]   3/11/2025  2:30 PM Linda Mayorga, PT [facility]   3/14/2025  2:30 PM Linda Mayorga, PT [facility]   5/15/2025  1:00 PM Federico Valentine MD [facility]   7/29/2025  9:15 AM MAT LAB MAT [facility] DE[facility]   8/5/2025  2:00 PM Federico Valentine MD [facility] DEP

## 2025-02-25 ENCOUNTER — APPOINTMENT (OUTPATIENT)
Dept: PHYSICAL THERAPY | Age: 80
End: 2025-02-25
Attending: INTERNAL MEDICINE
Payer: COMMERCIAL

## 2025-02-28 ENCOUNTER — APPOINTMENT (OUTPATIENT)
Dept: PHYSICAL THERAPY | Age: 80
End: 2025-02-28
Attending: INTERNAL MEDICINE
Payer: COMMERCIAL

## 2025-03-03 ENCOUNTER — OFFICE VISIT (OUTPATIENT)
Age: 80
End: 2025-03-03
Payer: COMMERCIAL

## 2025-03-03 ENCOUNTER — TELEPHONE (OUTPATIENT)
Dept: INTERNAL MEDICINE CLINIC | Facility: CLINIC | Age: 80
End: 2025-03-03

## 2025-03-03 ENCOUNTER — TELEPHONE (OUTPATIENT)
Age: 80
End: 2025-03-03

## 2025-03-03 VITALS
DIASTOLIC BLOOD PRESSURE: 86 MMHG | HEART RATE: 70 BPM | HEIGHT: 60 IN | BODY MASS INDEX: 28.43 KG/M2 | SYSTOLIC BLOOD PRESSURE: 138 MMHG | WEIGHT: 144.8 LBS

## 2025-03-03 DIAGNOSIS — I10 PRIMARY HYPERTENSION: ICD-10-CM

## 2025-03-03 DIAGNOSIS — I50.32 CHRONIC DIASTOLIC CONGESTIVE HEART FAILURE (HCC): ICD-10-CM

## 2025-03-03 DIAGNOSIS — I48.0 PAROXYSMAL ATRIAL FIBRILLATION (HCC): Primary | ICD-10-CM

## 2025-03-03 DIAGNOSIS — G72.0 STATIN MYOPATHY: ICD-10-CM

## 2025-03-03 DIAGNOSIS — I71.21 ANEURYSM OF ASCENDING AORTA WITHOUT RUPTURE: ICD-10-CM

## 2025-03-03 DIAGNOSIS — T46.6X5A STATIN MYOPATHY: ICD-10-CM

## 2025-03-03 DIAGNOSIS — E78.00 PURE HYPERCHOLESTEROLEMIA: ICD-10-CM

## 2025-03-03 PROCEDURE — 3075F SYST BP GE 130 - 139MM HG: CPT | Performed by: INTERNAL MEDICINE

## 2025-03-03 PROCEDURE — 1126F AMNT PAIN NOTED NONE PRSNT: CPT | Performed by: INTERNAL MEDICINE

## 2025-03-03 PROCEDURE — 3079F DIAST BP 80-89 MM HG: CPT | Performed by: INTERNAL MEDICINE

## 2025-03-03 PROCEDURE — 1123F ACP DISCUSS/DSCN MKR DOCD: CPT | Performed by: INTERNAL MEDICINE

## 2025-03-03 PROCEDURE — 99214 OFFICE O/P EST MOD 30 MIN: CPT | Performed by: INTERNAL MEDICINE

## 2025-03-03 NOTE — TELEPHONE ENCOUNTER
MEDICATION REFILL REQUEST      Name of Medication:  Xarelto  Dose:  20 mg  Frequency:  QD  Quantity:  90  Days' supply:  90 with 3 refill      Pharmacy Name/Location:  [phone]  Order # [id number]  Pt ID -[id number]    Please call into [facility] and then call and let pt know this has been done

## 2025-03-03 NOTE — TELEPHONE ENCOUNTER
Spoke with pt and she saw Dr. Lowry today and set up her [facility] account. Rx printed and pending signature.

## 2025-03-03 NOTE — TELEPHONE ENCOUNTER
Pt is requesting information concerning a medication (inhaler) that was discussed a the last visit. Pt is requesting a call back please.

## 2025-03-03 NOTE — PROGRESS NOTES
diuretics was reviewed (if needed) with the patient, including importance of daily weights and low sodium diet.  Importance of BP control as well.  The patient has been instructed to call our office with worsening shortness of breath, edema or other heart failure related signs/symptoms.  All questions were answered with the patient voicing understanding.       PAF:  off the NOAC for now.   Added dilt 120 daily  May need PM and AVN abl in the future.     Follow anemia.   We discussed xarelto.  Will get thru [facility] and follow Hb.         I have reviewed their anticoagulation treatment, instructed patient to call with any bleeding issues such as melana or other.  The patient will call with any issues related to their treatment.  All questions were answered with the patient voicing understanding.         HTN: on Ace BID.         Patient has been instructed and agrees to call our office with any issues or other concerns related to their cardiac condition(s) and/or complaint(s).        Return in about 3 months (around 6/3/2025).       Eliceo Lowry, DO  3/3/2025

## 2025-03-06 NOTE — TELEPHONE ENCOUNTER
Pt is calling back asking for nurse to please call her regarding the RX below.please call pt [phone]

## 2025-03-06 NOTE — TELEPHONE ENCOUNTER
Called patient and she wanted to know the inhaler that Dr Valentine recommended at last visit. She states if [facility] has this inhaler on their list she may want Dr Valentine to fax a prescription to them.    Patient is to call back if Spiriva is on their list

## 2025-03-19 RX ORDER — LISINOPRIL 20 MG/1
20 TABLET ORAL 2 TIMES DAILY
Qty: 180 TABLET | Refills: 3 | Status: SHIPPED | OUTPATIENT
Start: 2025-03-19

## 2025-03-19 NOTE — TELEPHONE ENCOUNTER
Requested Prescriptions     Pending Prescriptions Disp Refills    lisinopril (PRINIVIL;ZESTRIL) 20 MG tablet [Pharmacy Med Name: LISINOPRIL 20 MG TAB[*]] 180 tablet 3     Sig: TAKE ONE TABLET BY MOUTH TWICE A DAY     Verified rx in last OV date 3/3/25. Pharmacy confirmed. Erx as requested.

## 2025-04-03 ENCOUNTER — OFFICE VISIT (OUTPATIENT)
Dept: INTERNAL MEDICINE CLINIC | Facility: CLINIC | Age: 80
End: 2025-04-03
Payer: COMMERCIAL

## 2025-04-03 VITALS
DIASTOLIC BLOOD PRESSURE: 80 MMHG | SYSTOLIC BLOOD PRESSURE: 130 MMHG | OXYGEN SATURATION: 96 % | HEART RATE: 93 BPM | TEMPERATURE: 97.5 F | HEIGHT: 60 IN | BODY MASS INDEX: 28.86 KG/M2 | WEIGHT: 147 LBS

## 2025-04-03 DIAGNOSIS — I48.0 PAROXYSMAL ATRIAL FIBRILLATION (HCC): ICD-10-CM

## 2025-04-03 DIAGNOSIS — J44.1 COPD EXACERBATION (HCC): Primary | ICD-10-CM

## 2025-04-03 DIAGNOSIS — J44.9 STAGE 1 MILD COPD BY GOLD CLASSIFICATION (HCC): ICD-10-CM

## 2025-04-03 DIAGNOSIS — I50.32 CHRONIC DIASTOLIC CONGESTIVE HEART FAILURE (HCC): ICD-10-CM

## 2025-04-03 PROCEDURE — 1160F RVW MEDS BY RX/DR IN RCRD: CPT | Performed by: INTERNAL MEDICINE

## 2025-04-03 PROCEDURE — 1123F ACP DISCUSS/DSCN MKR DOCD: CPT | Performed by: INTERNAL MEDICINE

## 2025-04-03 PROCEDURE — 1159F MED LIST DOCD IN RCRD: CPT | Performed by: INTERNAL MEDICINE

## 2025-04-03 PROCEDURE — 99213 OFFICE O/P EST LOW 20 MIN: CPT | Performed by: INTERNAL MEDICINE

## 2025-04-03 PROCEDURE — 3079F DIAST BP 80-89 MM HG: CPT | Performed by: INTERNAL MEDICINE

## 2025-04-03 PROCEDURE — 96372 THER/PROPH/DIAG INJ SC/IM: CPT | Performed by: INTERNAL MEDICINE

## 2025-04-03 PROCEDURE — 3075F SYST BP GE 130 - 139MM HG: CPT | Performed by: INTERNAL MEDICINE

## 2025-04-03 RX ORDER — CHLOPHEDIANOL HCL AND PYRILAMINE MALEATE 12.5; 12.5 MG/5ML; MG/5ML
SOLUTION ORAL
Qty: 250 ML | Refills: 0 | Status: SHIPPED | OUTPATIENT
Start: 2025-04-03

## 2025-04-03 RX ORDER — AZITHROMYCIN 250 MG/1
TABLET, FILM COATED ORAL
Qty: 6 TABLET | Refills: 0 | Status: SHIPPED | OUTPATIENT
Start: 2025-04-03 | End: 2025-04-13

## 2025-04-03 RX ORDER — METHYLPREDNISOLONE ACETATE 40 MG/ML
40 INJECTION, SUSPENSION INTRA-ARTICULAR; INTRALESIONAL; INTRAMUSCULAR; SOFT TISSUE ONCE
Status: COMPLETED | OUTPATIENT
Start: 2025-04-03 | End: 2025-04-03

## 2025-04-03 RX ORDER — ALBUTEROL SULFATE 90 UG/1
2 INHALANT RESPIRATORY (INHALATION) 4 TIMES DAILY PRN
Qty: 18 G | Refills: 5 | Status: SHIPPED | OUTPATIENT
Start: 2025-04-03

## 2025-04-03 RX ORDER — CEFTRIAXONE 500 MG/1
500 INJECTION, POWDER, FOR SOLUTION INTRAMUSCULAR; INTRAVENOUS ONCE
Status: COMPLETED | OUTPATIENT
Start: 2025-04-03 | End: 2025-04-03

## 2025-04-03 RX ADMIN — CEFTRIAXONE 500 MG: 500 INJECTION, POWDER, FOR SOLUTION INTRAMUSCULAR; INTRAVENOUS at 11:35

## 2025-04-03 RX ADMIN — METHYLPREDNISOLONE ACETATE 40 MG: 40 INJECTION, SUSPENSION INTRA-ARTICULAR; INTRALESIONAL; INTRAMUSCULAR; SOFT TISSUE at 11:35

## 2025-04-03 NOTE — PROGRESS NOTES
Chief Complaint   Patient presents with    Cough     Present for 3 weeks       Magdalene Whittington 79 y.o. female   History of Present Illness  The patient presents for evaluation of a persistent cough.    A cough has been present for the past 3 weeks. Initially, it was thought to resolve on its own, but it has persisted and disrupted sleep last night. Wheezing is reported during physical exertion, described as \"if I'm exerting myself at all, I can hear it.\" Mild chest pain is noted. An albuterol inhaler has been used 3 to 4 times daily, providing some relief.    Results for orders placed or performed in visit on 02/03/25 (from the past 2160 hours)   CBC   Result Value Ref Range    WBC 8.3 4.3 - 11.1 K/uL    RBC 4.14 4.05 - 5.2 M/uL    Hemoglobin 12.3 11.7 - 15.4 g/dL    Hematocrit 37.6 35.8 - 46.3 %    MCV 90.8 82 - 102 FL    MCH 29.7 26.1 - 32.9 PG    MCHC 32.7 31.4 - 35.0 g/dL    RDW 15.5 (H) 11.9 - 14.6 %    Platelets 304 150 - 450 K/uL    MPV 11.5 9.4 - 12.3 FL    nRBC 0.00 0.0 - 0.2 K/uL   Iron and TIBC   Result Value Ref Range    Iron 29 (L) 35 - 100 ug/dL    TIBC 370 240 - 450 ug/dL    Iron % Saturation 8 (L) 20 - 50 %    UIBC 341.0 112.0 - 347.0 ug/dL   Basic Metabolic Panel   Result Value Ref Range    Sodium 144 136 - 145 mmol/L    Potassium 4.3 3.5 - 5.1 mmol/L    Chloride 103 98 - 107 mmol/L    CO2 31 (H) 20 - 29 mmol/L    Anion Gap 10 7 - 16 mmol/L    Glucose 98 70 - 99 mg/dL    BUN 12 8 - 23 MG/DL    Creatinine 0.76 0.60 - 1.10 MG/DL    Est, Glom Filt Rate 79 >60 ml/min/1.73m2    Calcium 9.6 8.8 - 10.2 MG/DL   Reticulocytes   Result Value Ref Range    Reticulocyte Count,Automated 1.8 0.3 - 2.0 %    Absolute Retic # 0.0725 0.026 - 0.095 M/ul    Immature Retic Fraction 11.1 3.0 - 15.9 %    Retic Hemoglobin conc. 32 29 - 35 pg   Ferritin   Result Value Ref Range    Ferritin 24 8 - 388 NG/ML         /80 (BP Site: Left Upper Arm, Patient Position: Sitting, BP Cuff Size: Small Adult)   Pulse 93

## 2025-04-25 ENCOUNTER — HOSPITAL ENCOUNTER (OUTPATIENT)
Dept: GENERAL RADIOLOGY | Age: 80
Discharge: HOME | End: 2025-04-27
Payer: COMMERCIAL

## 2025-04-25 ENCOUNTER — OFFICE VISIT (OUTPATIENT)
Dept: FAMILY MEDICINE CLINIC | Facility: CLINIC | Age: 80
End: 2025-04-25
Payer: COMMERCIAL

## 2025-04-25 VITALS
TEMPERATURE: 97.9 F | DIASTOLIC BLOOD PRESSURE: 78 MMHG | RESPIRATION RATE: 16 BRPM | HEART RATE: 96 BPM | OXYGEN SATURATION: 97 % | SYSTOLIC BLOOD PRESSURE: 122 MMHG

## 2025-04-25 DIAGNOSIS — J44.9 STAGE 1 MILD COPD BY GOLD CLASSIFICATION (HCC): ICD-10-CM

## 2025-04-25 DIAGNOSIS — J44.1 COPD EXACERBATION (HCC): Primary | ICD-10-CM

## 2025-04-25 DIAGNOSIS — J44.1 COPD EXACERBATION (HCC): ICD-10-CM

## 2025-04-25 PROCEDURE — 1160F RVW MEDS BY RX/DR IN RCRD: CPT | Performed by: PHYSICIAN ASSISTANT

## 2025-04-25 PROCEDURE — 71046 X-RAY EXAM CHEST 2 VIEWS: CPT

## 2025-04-25 PROCEDURE — 1159F MED LIST DOCD IN RCRD: CPT | Performed by: PHYSICIAN ASSISTANT

## 2025-04-25 PROCEDURE — 99214 OFFICE O/P EST MOD 30 MIN: CPT | Performed by: PHYSICIAN ASSISTANT

## 2025-04-25 PROCEDURE — 3074F SYST BP LT 130 MM HG: CPT | Performed by: PHYSICIAN ASSISTANT

## 2025-04-25 PROCEDURE — 3078F DIAST BP <80 MM HG: CPT | Performed by: PHYSICIAN ASSISTANT

## 2025-04-25 PROCEDURE — 1123F ACP DISCUSS/DSCN MKR DOCD: CPT | Performed by: PHYSICIAN ASSISTANT

## 2025-04-25 RX ORDER — GUAIFENESIN 600 MG/1
1200 TABLET, EXTENDED RELEASE ORAL 2 TIMES DAILY PRN
Qty: 60 TABLET | Refills: 0 | Status: SHIPPED | OUTPATIENT
Start: 2025-04-25

## 2025-04-25 RX ORDER — PREDNISONE 10 MG/1
TABLET ORAL
Qty: 21 TABLET | Refills: 0 | Status: SHIPPED | OUTPATIENT
Start: 2025-04-25 | End: 2025-05-05

## 2025-04-25 SDOH — ECONOMIC STABILITY: FOOD INSECURITY: WITHIN THE PAST 12 MONTHS, THE FOOD YOU BOUGHT JUST DIDN'T LAST AND YOU DIDN'T HAVE MONEY TO GET MORE.: NEVER TRUE

## 2025-04-25 SDOH — ECONOMIC STABILITY: FOOD INSECURITY: WITHIN THE PAST 12 MONTHS, YOU WORRIED THAT YOUR FOOD WOULD RUN OUT BEFORE YOU GOT MONEY TO BUY MORE.: NEVER TRUE

## 2025-04-25 NOTE — PROGRESS NOTES
Radha Grace PA-C, [facility], MPH  [facility]  [address], [address]  [place] [address]  Phone [phone]    SUBJECTIVE  Chief Complaint   Patient presents with    Cough     Cough  that won't go away for 6-7 weeks, pt saw PCP 3 weeks ago and got Rx which helped some, pt has COPD     HPI   Magdalene Whittington is a 79 y.o. female with PMH as below presents for an acute visit.       Pt complains of cough x 6-7 weeks. Cough is dry with occasional production of mucus. She is not SOB. She is wheezing more than normal. Denies hemoptysis, pleurisy, chest tightness. States she does have a discomfort in epigastric area sometimes when she is coughing and sometimes apart from coughing. She is using her inhaler which does not seem to help with her cough. States she has had a slight sore throat.     Pt was seen by PCP (Dr. Valentine) on 4/3/25 for COPD exacerbation. She was given ceftriaxone 500mg and methylprednisolone 40mg injections and prescribed Azithromycin and cough syrup.     Pt states she completed course of azithromycin, and that cough became \"slightly better\" after this but would not go away.    States she was seen at [facility] yesterday for the same symptoms. She did not have CXR done. She was prescribed amoxicillin 500mg 2 BID q12 hours x 5 days. States she has not filled this medication yet.     States she does not have a cough at all at baseline. Denies hx of DVT or PE. Reports compliance on Xarelto. She has hx of COPD. It does not appear that she has had pulmonology consult previously, due to lack to symptoms.    Denies hospitalization in the past 12 months.    Review of Systems has intermittent LE edema, for which she takes diuretics PRN.     Patient Active Problem List   Diagnosis    Hypertension    Aneurysm, carotid artery, internal    Osteoarthritis of basilar joint of thumb    Osteopenia    Low back pain    Thoracic ascending aortic aneurysm    Hyperlipemia    History

## 2025-04-28 ENCOUNTER — RESULTS FOLLOW-UP (OUTPATIENT)
Dept: FAMILY MEDICINE CLINIC | Facility: CLINIC | Age: 80
End: 2025-04-28

## 2025-05-15 ENCOUNTER — OFFICE VISIT (OUTPATIENT)
Dept: INTERNAL MEDICINE CLINIC | Facility: CLINIC | Age: 80
End: 2025-05-15
Payer: COMMERCIAL

## 2025-05-15 VITALS
HEIGHT: 60 IN | DIASTOLIC BLOOD PRESSURE: 80 MMHG | SYSTOLIC BLOOD PRESSURE: 138 MMHG | HEART RATE: 88 BPM | OXYGEN SATURATION: 97 % | BODY MASS INDEX: 28.47 KG/M2 | TEMPERATURE: 97.9 F | WEIGHT: 145 LBS

## 2025-05-15 DIAGNOSIS — E78.00 PURE HYPERCHOLESTEROLEMIA: ICD-10-CM

## 2025-05-15 DIAGNOSIS — Z86.73 HISTORY OF STROKE: ICD-10-CM

## 2025-05-15 DIAGNOSIS — J44.1 COPD EXACERBATION (HCC): ICD-10-CM

## 2025-05-15 DIAGNOSIS — I48.0 PAROXYSMAL ATRIAL FIBRILLATION (HCC): ICD-10-CM

## 2025-05-15 DIAGNOSIS — R05.3 CHRONIC COUGH: Primary | ICD-10-CM

## 2025-05-15 DIAGNOSIS — Z87.19 HX OF HIATAL HERNIA: ICD-10-CM

## 2025-05-15 DIAGNOSIS — I10 PRIMARY HYPERTENSION: ICD-10-CM

## 2025-05-15 PROCEDURE — 99215 OFFICE O/P EST HI 40 MIN: CPT | Performed by: INTERNAL MEDICINE

## 2025-05-15 PROCEDURE — 3075F SYST BP GE 130 - 139MM HG: CPT | Performed by: INTERNAL MEDICINE

## 2025-05-15 PROCEDURE — 94640 AIRWAY INHALATION TREATMENT: CPT | Performed by: INTERNAL MEDICINE

## 2025-05-15 PROCEDURE — 3079F DIAST BP 80-89 MM HG: CPT | Performed by: INTERNAL MEDICINE

## 2025-05-15 PROCEDURE — 1160F RVW MEDS BY RX/DR IN RCRD: CPT | Performed by: INTERNAL MEDICINE

## 2025-05-15 PROCEDURE — 1159F MED LIST DOCD IN RCRD: CPT | Performed by: INTERNAL MEDICINE

## 2025-05-15 PROCEDURE — 1123F ACP DISCUSS/DSCN MKR DOCD: CPT | Performed by: INTERNAL MEDICINE

## 2025-05-15 RX ORDER — IPRATROPIUM BROMIDE AND ALBUTEROL SULFATE 2.5; .5 MG/3ML; MG/3ML
1 SOLUTION RESPIRATORY (INHALATION) ONCE
Status: COMPLETED | OUTPATIENT
Start: 2025-05-15 | End: 2025-05-15

## 2025-05-15 RX ORDER — EZETIMIBE 10 MG/1
10 TABLET ORAL DAILY
Qty: 90 TABLET | Refills: 1 | Status: SHIPPED | OUTPATIENT
Start: 2025-05-15

## 2025-05-15 RX ORDER — LOSARTAN POTASSIUM 50 MG/1
50 TABLET ORAL DAILY
Qty: 30 TABLET | Refills: 5 | Status: SHIPPED | OUTPATIENT
Start: 2025-05-15

## 2025-05-15 RX ORDER — BUDESONIDE, GLYCOPYRROLATE, AND FORMOTEROL FUMARATE 160; 9; 4.8 UG/1; UG/1; UG/1
1 AEROSOL, METERED RESPIRATORY (INHALATION) 2 TIMES DAILY
Qty: 2 EACH | Refills: 0 | Status: SHIPPED | COMMUNITY
Start: 2025-05-15

## 2025-05-15 RX ADMIN — IPRATROPIUM BROMIDE AND ALBUTEROL SULFATE 1 DOSE: 2.5; .5 SOLUTION RESPIRATORY (INHALATION) at 13:58

## 2025-05-15 ASSESSMENT — ENCOUNTER SYMPTOMS
COUGH: 1
SHORTNESS OF BREATH: 1
WHEEZING: 1

## 2025-05-15 NOTE — PROGRESS NOTES
updated as appropriate.    Records from outside providers have been reviewed, summarized, and considered as noted in the history of present illness, past medical history, and objective data of this note and encounter.       Current Outpatient Medications:     ezetimibe (ZETIA) 10 MG tablet, Take 1 tablet by mouth daily, Disp: 90 tablet, Rfl: 1    losartan (COZAAR) 50 MG tablet, Take 1 tablet by mouth daily, Disp: 30 tablet, Rfl: 5    Budeson-Glycopyrrol-Formoterol 160-9-4.8 MCG/ACT AERO, Inhale 1 each into the lungs in the morning and at bedtime, Disp: 1 each, Rfl: 5    tiotropium (SPIRIVA RESPIMAT) 2.5 MCG/ACT AERS inhaler, Inhale 2 puffs into the lungs daily, Disp: 1 each, Rfl: 5    albuterol sulfate HFA (VENTOLIN HFA) 108 (90 Base) MCG/ACT inhaler, Inhale 2 puffs into the lungs 4 times daily as needed for Wheezing, Disp: 18 g, Rfl: 5    rivaroxaban (XARELTO) 20 MG TABS tablet, Take 1 tablet by mouth daily (with breakfast), Disp: 90 tablet, Rfl: 3    torsemide (DEMADEX) 20 MG tablet, Take 1 tablet by mouth daily, Disp: 90 tablet, Rfl: 3    magnesium oxide (MAG-OX) 400 MG tablet, Take 1 tablet by mouth daily, Disp: 30 tablet, Rfl: 1    dilTIAZem (CARDIZEM CD) 120 MG extended release capsule, Take 1 capsule by mouth daily, Disp: 30 capsule, Rfl: 11    esomeprazole (NEXIUM) 40 MG delayed release capsule, Take 1 capsule by mouth daily as needed, Disp: , Rfl:     Spacer/Aero-Holding Chambers RONEN, 1 Device by Does not apply route daily as needed (use with albuterol spacer) (Patient not taking: Reported on 5/15/2025), Disp: 1 each, Rfl: 0    guaiFENesin (MUCINEX) 600 MG extended release tablet, Take 2 tablets by mouth 2 times daily as needed for Congestion (Patient not taking: Reported on 5/15/2025), Disp: 60 tablet, Rfl: 0         Review of Systems   Respiratory:  Positive for cough, shortness of breath and wheezing.        Results for orders placed or performed during the hospital encounter of 04/25/25 (from the past

## 2025-05-23 ENCOUNTER — PATIENT MESSAGE (OUTPATIENT)
Dept: INTERNAL MEDICINE CLINIC | Facility: CLINIC | Age: 80
End: 2025-05-23

## 2025-05-23 DIAGNOSIS — J44.9 STAGE 1 MILD COPD BY GOLD CLASSIFICATION (HCC): Primary | ICD-10-CM

## 2025-05-28 RX ORDER — BUDESONIDE, GLYCOPYRROLATE, AND FORMOTEROL FUMARATE 160; 9; 4.8 UG/1; UG/1; UG/1
1 AEROSOL, METERED RESPIRATORY (INHALATION) 2 TIMES DAILY
Qty: 2 EACH | Refills: 0 | Status: SHIPPED | COMMUNITY
Start: 2025-05-28

## 2025-06-03 NOTE — PROCEDURE: MIPS QUALITY
Quality 110: Preventive Care And Screening: Influenza Immunization: Influenza Immunization Administered during Influenza season
Quality 130: Documentation Of Current Medications In The Medical Record: Current Medications Documented
Quality 111:Pneumonia Vaccination Status For Older Adults: Pneumococcal Vaccination Previously Received
Detail Level: Detailed
done

## 2025-06-05 ENCOUNTER — OFFICE VISIT (OUTPATIENT)
Age: 80
End: 2025-06-05
Payer: COMMERCIAL

## 2025-06-05 VITALS
SYSTOLIC BLOOD PRESSURE: 138 MMHG | HEART RATE: 88 BPM | DIASTOLIC BLOOD PRESSURE: 76 MMHG | WEIGHT: 148 LBS | HEIGHT: 60 IN | BODY MASS INDEX: 29.06 KG/M2

## 2025-06-05 DIAGNOSIS — T46.6X5A STATIN MYOPATHY: ICD-10-CM

## 2025-06-05 DIAGNOSIS — I71.21 ANEURYSM OF ASCENDING AORTA WITHOUT RUPTURE: ICD-10-CM

## 2025-06-05 DIAGNOSIS — I10 PRIMARY HYPERTENSION: ICD-10-CM

## 2025-06-05 DIAGNOSIS — G72.0 STATIN MYOPATHY: ICD-10-CM

## 2025-06-05 DIAGNOSIS — I50.32 CHRONIC DIASTOLIC CONGESTIVE HEART FAILURE (HCC): ICD-10-CM

## 2025-06-05 DIAGNOSIS — I48.0 PAROXYSMAL ATRIAL FIBRILLATION (HCC): Primary | ICD-10-CM

## 2025-06-05 PROCEDURE — 3078F DIAST BP <80 MM HG: CPT | Performed by: INTERNAL MEDICINE

## 2025-06-05 PROCEDURE — 3075F SYST BP GE 130 - 139MM HG: CPT | Performed by: INTERNAL MEDICINE

## 2025-06-05 PROCEDURE — 99214 OFFICE O/P EST MOD 30 MIN: CPT | Performed by: INTERNAL MEDICINE

## 2025-06-05 PROCEDURE — 1123F ACP DISCUSS/DSCN MKR DOCD: CPT | Performed by: INTERNAL MEDICINE

## 2025-06-05 PROCEDURE — 1126F AMNT PAIN NOTED NONE PRSNT: CPT | Performed by: INTERNAL MEDICINE

## 2025-06-05 NOTE — PROGRESS NOTES
[address] [facility], [place] [address]  PHONE: [phone]     25    NAME:  Madgalene Whittington  : 1945  MRN: [id number]       SUBJECTIVE:   Magdalene Whittington is a 79 y.o. female seen for a follow up visit regarding the following:     Chief Complaint   Patient presents with    Congestive Heart Failure    Follow-up       HPI:  Here for PAF, Aneurysm.       Echo : AO 4.1cm, normal EF. Mod AI and MR.   CTA 2021:   Stable to slightly enlarged ascending tubular thoracic aorta now measuring   4.2 cm.   Echo 2023: normal EF, mild to mod AI and MR.      NST 2024: The study is positive for myocardial ischemia. Findings suggest a high risk of cardiac events.   Echo 2024: normal EF.      Cough better with new inhalers.  ON torsemide now qod for edema.  OG better now.  Taking diuretic every few days.    Lives home alone now.   No CP.  No pressure.    Feeling better now.    Doing well on anticoagulation treatment as reviewed today, no bleeding issues or excessive bruising noted.          Possible Ace cough.         Patient denies recent history of orthopnea, PND, excessive dizziness and/or syncope.       Past Medical History, Past Surgical History, Family history, Social History, and Medications were all reviewed with the patient today and updated as necessary.     Current Outpatient Medications   Medication Sig Dispense Refill    ezetimibe (ZETIA) 10 MG tablet Take 1 tablet by mouth daily 90 tablet 1    losartan (COZAAR) 50 MG tablet Take 1 tablet by mouth daily 30 tablet 5    BREZTRI AEROSPHERE 160-9-4.8 MCG/ACT AERO Inhale 1 puff into the lungs in the morning and at bedtime 2 each 0    albuterol sulfate HFA (VENTOLIN HFA) 108 (90 Base) MCG/ACT inhaler Inhale 2 puffs into the lungs 4 times daily as needed for Wheezing 18 g 5    rivaroxaban (XARELTO) 20 MG TABS tablet Take 1 tablet by mouth daily (with breakfast) 90 tablet 3    torsemide (DEMADEX) 20 MG tablet Take 1 tablet by mouth

## 2025-06-17 ENCOUNTER — APPOINTMENT (OUTPATIENT)
Dept: URBAN - METROPOLITAN AREA CLINIC 24 | Facility: CLINIC | Age: 80
Setting detail: DERMATOLOGY
End: 2025-06-17

## 2025-06-17 DIAGNOSIS — L21.8 OTHER SEBORRHEIC DERMATITIS: ICD-10-CM

## 2025-06-17 DIAGNOSIS — L82.0 INFLAMED SEBORRHEIC KERATOSIS: ICD-10-CM

## 2025-06-17 DIAGNOSIS — L85.3 XEROSIS CUTIS: ICD-10-CM

## 2025-06-17 PROCEDURE — ? PRESCRIPTION MEDICATION MANAGEMENT

## 2025-06-17 PROCEDURE — ? COUNSELING

## 2025-06-17 PROCEDURE — ? LIQUID NITROGEN

## 2025-06-17 PROCEDURE — ? PRESCRIPTION

## 2025-06-17 RX ORDER — KETOCONAZOLE 20 MG/G
CREAM TOPICAL BID
Qty: 30 | Refills: 11 | Status: ERX | COMMUNITY
Start: 2025-06-17

## 2025-06-17 RX ADMIN — KETOCONAZOLE: 20 CREAM TOPICAL at 00:00

## 2025-06-17 ASSESSMENT — LOCATION DETAILED DESCRIPTION DERM
LOCATION DETAILED: LEFT LATERAL DISTAL PRETIBIAL REGION
LOCATION DETAILED: GLABELLA
LOCATION DETAILED: RIGHT INFERIOR MEDIAL FOREHEAD

## 2025-06-17 ASSESSMENT — LOCATION ZONE DERM
LOCATION ZONE: FACE
LOCATION ZONE: LEG

## 2025-06-17 ASSESSMENT — LOCATION SIMPLE DESCRIPTION DERM
LOCATION SIMPLE: GLABELLA
LOCATION SIMPLE: LEFT PRETIBIAL REGION
LOCATION SIMPLE: RIGHT FOREHEAD

## 2025-06-17 NOTE — PROCEDURE: PRESCRIPTION MEDICATION MANAGEMENT
Detail Level: Zone
Initiate Treatment: Ketoconazole 2 % topical cream (Apply a thin layer to the affected areas on face twice daily.)
Render In Strict Bullet Format?: No
Samples Given: La Roche Posay Urea Cream (Apply BID to AA PRN)

## 2025-06-17 NOTE — PROCEDURE: LIQUID NITROGEN
Number Of Freeze-Thaw Cycles: 2 freeze-thaw cycles
Show Applicator Variable?: Yes
Medical Necessity Clause: This procedure was medically necessary because the lesions that were treated were:
Detail Level: Detailed
Medical Necessity Information: It is in your best interest to select a reason for this procedure from the list below. All of these items fulfill various CMS LCD requirements except the new and changing color options.
Spray Paint Text: The liquid nitrogen was applied to the skin utilizing a spray paint frosting technique.
Render Note In Bullet Format When Appropriate: No
Consent: The patient's consent was obtained including but not limited to risks of crusting, scabbing, blistering, scarring, darker or lighter pigmentary change, recurrence, incomplete removal and infection.
Duration Of Freeze Thaw-Cycle (Seconds): 15-20
Post-Care Instructions: I reviewed with the patient in detail post-care instructions. Patient is to wear sunprotection, and avoid picking at any of the treated lesions. Pt may apply Vaseline to crusted or scabbing areas.

## 2025-06-23 DIAGNOSIS — E78.00 PURE HYPERCHOLESTEROLEMIA: ICD-10-CM

## 2025-06-24 RX ORDER — EZETIMIBE 10 MG/1
10 TABLET ORAL DAILY
Qty: 90 TABLET | Refills: 1 | OUTPATIENT
Start: 2025-06-24

## 2025-07-21 DIAGNOSIS — I10 PRIMARY HYPERTENSION: Primary | ICD-10-CM

## 2025-07-21 DIAGNOSIS — D64.9 SYMPTOMATIC ANEMIA: ICD-10-CM

## 2025-07-21 DIAGNOSIS — E78.00 PURE HYPERCHOLESTEROLEMIA: ICD-10-CM

## 2025-07-29 ENCOUNTER — LAB (OUTPATIENT)
Dept: INTERNAL MEDICINE CLINIC | Facility: CLINIC | Age: 80
End: 2025-07-29

## 2025-07-29 DIAGNOSIS — D64.9 SYMPTOMATIC ANEMIA: ICD-10-CM

## 2025-07-29 DIAGNOSIS — I10 PRIMARY HYPERTENSION: ICD-10-CM

## 2025-07-29 DIAGNOSIS — E78.00 PURE HYPERCHOLESTEROLEMIA: ICD-10-CM

## 2025-07-29 LAB
ALBUMIN SERPL-MCNC: 3.5 G/DL (ref 3.2–4.6)
ALBUMIN/GLOB SERPL: 1 (ref 1–1.9)
ALP SERPL-CCNC: 83 U/L (ref 35–104)
ALT SERPL-CCNC: 14 U/L (ref 8–45)
ANION GAP SERPL CALC-SCNC: 14 MMOL/L (ref 7–16)
AST SERPL-CCNC: 24 U/L (ref 15–37)
BASOPHILS # BLD: 0.06 K/UL (ref 0–0.2)
BASOPHILS NFR BLD: 0.8 % (ref 0–2)
BILIRUB SERPL-MCNC: 0.5 MG/DL (ref 0–1.2)
BUN SERPL-MCNC: 16 MG/DL (ref 8–23)
CALCIUM SERPL-MCNC: 9.6 MG/DL (ref 8.8–10.2)
CHLORIDE SERPL-SCNC: 97 MMOL/L (ref 98–107)
CHOLEST SERPL-MCNC: 156 MG/DL (ref 0–200)
CO2 SERPL-SCNC: 29 MMOL/L (ref 20–29)
CREAT SERPL-MCNC: 0.84 MG/DL (ref 0.6–1.1)
DIFFERENTIAL METHOD BLD: NORMAL
EOSINOPHIL # BLD: 0.5 K/UL (ref 0–0.8)
EOSINOPHIL NFR BLD: 6.8 % (ref 0.5–7.8)
ERYTHROCYTE [DISTWIDTH] IN BLOOD BY AUTOMATED COUNT: 14 % (ref 11.9–14.6)
GLOBULIN SER CALC-MCNC: 3.5 G/DL (ref 2.3–3.5)
GLUCOSE SERPL-MCNC: 113 MG/DL (ref 70–99)
HCT VFR BLD AUTO: 38.8 % (ref 35.8–46.3)
HDLC SERPL-MCNC: 58 MG/DL (ref 40–60)
HDLC SERPL: 2.7 (ref 0–5)
HGB BLD-MCNC: 12.6 G/DL (ref 11.7–15.4)
IMM GRANULOCYTES # BLD AUTO: 0.02 K/UL (ref 0–0.5)
IMM GRANULOCYTES NFR BLD AUTO: 0.3 % (ref 0–5)
IRON SATN MFR SERPL: 40 % (ref 20–50)
IRON SERPL-MCNC: 153 UG/DL (ref 35–100)
LDLC SERPL CALC-MCNC: 85 MG/DL (ref 0–100)
LYMPHOCYTES # BLD: 1.43 K/UL (ref 0.5–4.6)
LYMPHOCYTES NFR BLD: 19.4 % (ref 13–44)
MCH RBC QN AUTO: 31 PG (ref 26.1–32.9)
MCHC RBC AUTO-ENTMCNC: 32.5 G/DL (ref 31.4–35)
MCV RBC AUTO: 95.6 FL (ref 82–102)
MONOCYTES # BLD: 0.67 K/UL (ref 0.1–1.3)
MONOCYTES NFR BLD: 9.1 % (ref 4–12)
NEUTS SEG # BLD: 4.7 K/UL (ref 1.7–8.2)
NEUTS SEG NFR BLD: 63.6 % (ref 43–78)
NRBC # BLD: 0 K/UL (ref 0–0.2)
PLATELET # BLD AUTO: 248 K/UL (ref 150–450)
PMV BLD AUTO: 12.2 FL (ref 9.4–12.3)
POTASSIUM SERPL-SCNC: 3.5 MMOL/L (ref 3.5–5.1)
PROT SERPL-MCNC: 7 G/DL (ref 6.3–8.2)
RBC # BLD AUTO: 4.06 M/UL (ref 4.05–5.2)
SODIUM SERPL-SCNC: 140 MMOL/L (ref 136–145)
TIBC SERPL-MCNC: 381 UG/DL (ref 240–450)
TRIGL SERPL-MCNC: 61 MG/DL (ref 0–150)
UIBC SERPL-MCNC: 228 UG/DL (ref 112–347)
VLDLC SERPL CALC-MCNC: 12 MG/DL (ref 6–23)
WBC # BLD AUTO: 7.4 K/UL (ref 4.3–11.1)

## 2025-08-02 SDOH — HEALTH STABILITY: PHYSICAL HEALTH: ON AVERAGE, HOW MANY DAYS PER WEEK DO YOU ENGAGE IN MODERATE TO STRENUOUS EXERCISE (LIKE A BRISK WALK)?: 0 DAYS

## 2025-08-02 ASSESSMENT — LIFESTYLE VARIABLES
HOW OFTEN DO YOU HAVE A DRINK CONTAINING ALCOHOL: 4
HOW OFTEN DO YOU HAVE A DRINK CONTAINING ALCOHOL: 2-3 TIMES A WEEK
HOW OFTEN DO YOU HAVE SIX OR MORE DRINKS ON ONE OCCASION: 1
HOW MANY STANDARD DRINKS CONTAINING ALCOHOL DO YOU HAVE ON A TYPICAL DAY: 1
HOW MANY STANDARD DRINKS CONTAINING ALCOHOL DO YOU HAVE ON A TYPICAL DAY: 1 OR 2

## 2025-08-02 ASSESSMENT — PATIENT HEALTH QUESTIONNAIRE - PHQ9
1. LITTLE INTEREST OR PLEASURE IN DOING THINGS: NOT AT ALL
SUM OF ALL RESPONSES TO PHQ QUESTIONS 1-9: 0
2. FEELING DOWN, DEPRESSED OR HOPELESS: NOT AT ALL

## 2025-08-05 ENCOUNTER — OFFICE VISIT (OUTPATIENT)
Dept: INTERNAL MEDICINE CLINIC | Facility: CLINIC | Age: 80
End: 2025-08-05
Payer: COMMERCIAL

## 2025-08-05 VITALS
BODY MASS INDEX: 30.04 KG/M2 | SYSTOLIC BLOOD PRESSURE: 124 MMHG | OXYGEN SATURATION: 97 % | TEMPERATURE: 97 F | DIASTOLIC BLOOD PRESSURE: 76 MMHG | HEART RATE: 86 BPM | WEIGHT: 153 LBS | HEIGHT: 60 IN

## 2025-08-05 DIAGNOSIS — R73.01 ELEVATED FASTING GLUCOSE: ICD-10-CM

## 2025-08-05 DIAGNOSIS — I48.0 PAROXYSMAL ATRIAL FIBRILLATION (HCC): ICD-10-CM

## 2025-08-05 DIAGNOSIS — Z87.19 HISTORY OF GASTROINTESTINAL BLEEDING: ICD-10-CM

## 2025-08-05 DIAGNOSIS — I10 PRIMARY HYPERTENSION: ICD-10-CM

## 2025-08-05 DIAGNOSIS — Z00.00 MEDICARE ANNUAL WELLNESS VISIT, SUBSEQUENT: Primary | ICD-10-CM

## 2025-08-05 DIAGNOSIS — Z86.73 HISTORY OF STROKE: ICD-10-CM

## 2025-08-05 DIAGNOSIS — J44.9 STAGE 1 MILD COPD BY GOLD CLASSIFICATION (HCC): ICD-10-CM

## 2025-08-05 DIAGNOSIS — I50.32 CHRONIC DIASTOLIC CONGESTIVE HEART FAILURE (HCC): ICD-10-CM

## 2025-08-05 DIAGNOSIS — E78.00 PURE HYPERCHOLESTEROLEMIA: ICD-10-CM

## 2025-08-05 DIAGNOSIS — Z29.11 NEED FOR PROPHYLACTIC VACCINATION AND INOCULATION AGAINST RESPIRATORY SYNCYTIAL VIRUS (RSV): ICD-10-CM

## 2025-08-05 PROBLEM — D64.9 ANEMIA: Status: RESOLVED | Noted: 2024-11-14 | Resolved: 2025-08-05

## 2025-08-05 PROCEDURE — G0439 PPPS, SUBSEQ VISIT: HCPCS | Performed by: INTERNAL MEDICINE

## 2025-08-05 PROCEDURE — 1160F RVW MEDS BY RX/DR IN RCRD: CPT | Performed by: INTERNAL MEDICINE

## 2025-08-05 PROCEDURE — 1159F MED LIST DOCD IN RCRD: CPT | Performed by: INTERNAL MEDICINE

## 2025-08-05 PROCEDURE — 1123F ACP DISCUSS/DSCN MKR DOCD: CPT | Performed by: INTERNAL MEDICINE

## 2025-08-05 PROCEDURE — 3078F DIAST BP <80 MM HG: CPT | Performed by: INTERNAL MEDICINE

## 2025-08-05 PROCEDURE — G2211 COMPLEX E/M VISIT ADD ON: HCPCS | Performed by: INTERNAL MEDICINE

## 2025-08-05 PROCEDURE — 3074F SYST BP LT 130 MM HG: CPT | Performed by: INTERNAL MEDICINE

## 2025-08-05 PROCEDURE — 99214 OFFICE O/P EST MOD 30 MIN: CPT | Performed by: INTERNAL MEDICINE

## 2025-08-05 RX ORDER — EZETIMIBE 10 MG/1
10 TABLET ORAL DAILY
Qty: 90 TABLET | Refills: 3 | Status: SHIPPED | OUTPATIENT
Start: 2025-08-05

## 2025-08-05 RX ORDER — LOSARTAN POTASSIUM 50 MG/1
50 TABLET ORAL DAILY
Qty: 90 TABLET | Refills: 3 | Status: SHIPPED | OUTPATIENT
Start: 2025-08-05

## 2025-08-05 RX ORDER — RESPIRATORY SYNCYTIAL VISUS VACCINE RECOMBINANT, ADJUVANTED 120MCG/0.5
0.5 KIT INTRAMUSCULAR ONCE
Qty: 0.5 ML | Refills: 0 | Status: SHIPPED | OUTPATIENT
Start: 2025-08-05 | End: 2025-08-05

## 2025-08-05 RX ORDER — EZETIMIBE 10 MG/1
10 TABLET ORAL DAILY
Qty: 90 TABLET | Refills: 1 | Status: SHIPPED | OUTPATIENT
Start: 2025-08-05 | End: 2025-08-05

## 2025-08-05 RX ORDER — LOSARTAN POTASSIUM 50 MG/1
50 TABLET ORAL DAILY
Qty: 30 TABLET | Refills: 5 | Status: SHIPPED | OUTPATIENT
Start: 2025-08-05 | End: 2025-08-05

## 2025-08-11 DIAGNOSIS — R05.3 CHRONIC COUGH: Primary | ICD-10-CM

## 2025-08-12 ENCOUNTER — HOSPITAL ENCOUNTER (OUTPATIENT)
Dept: GENERAL RADIOLOGY | Age: 80
Discharge: HOME | End: 2025-08-14
Payer: COMMERCIAL

## 2025-08-12 ENCOUNTER — OFFICE VISIT (OUTPATIENT)
Dept: PULMONOLOGY | Age: 80
End: 2025-08-12
Payer: COMMERCIAL

## 2025-08-12 VITALS
HEIGHT: 60 IN | DIASTOLIC BLOOD PRESSURE: 87 MMHG | WEIGHT: 151 LBS | OXYGEN SATURATION: 91 % | RESPIRATION RATE: 14 BRPM | HEART RATE: 93 BPM | BODY MASS INDEX: 29.64 KG/M2 | SYSTOLIC BLOOD PRESSURE: 136 MMHG

## 2025-08-12 DIAGNOSIS — R05.3 CHRONIC COUGH: ICD-10-CM

## 2025-08-12 DIAGNOSIS — M79.89 LEG SWELLING: ICD-10-CM

## 2025-08-12 DIAGNOSIS — R05.9 COUGH, UNSPECIFIED TYPE: ICD-10-CM

## 2025-08-12 DIAGNOSIS — K21.9 GASTROESOPHAGEAL REFLUX DISEASE, UNSPECIFIED WHETHER ESOPHAGITIS PRESENT: ICD-10-CM

## 2025-08-12 DIAGNOSIS — J43.2 CENTRILOBULAR EMPHYSEMA (HCC): Primary | ICD-10-CM

## 2025-08-12 LAB
EXPIRATORY TIME: NORMAL
FEF 25-75% %PRED-PRE: NORMAL
FEF 25-75% PRED: NORMAL
FEF 25-75-PRE: NORMAL
FEV1 %PRED-PRE: 51 %
FEV1 PRED: 1.67 L
FEV1/FVC %PRED-PRE: NORMAL
FEV1/FVC PRED: NORMAL
FEV1/FVC: 63 %
FEV1: 0.86 L
FVC %PRED-PRE: 60 %
FVC PRED: 2.18 L
FVC: 1.36 L
PEF %PRED-PRE: NORMAL
PEF PRED: NORMAL
PEF-PRE: NORMAL

## 2025-08-12 PROCEDURE — 3079F DIAST BP 80-89 MM HG: CPT | Performed by: INTERNAL MEDICINE

## 2025-08-12 PROCEDURE — 1123F ACP DISCUSS/DSCN MKR DOCD: CPT | Performed by: INTERNAL MEDICINE

## 2025-08-12 PROCEDURE — 1160F RVW MEDS BY RX/DR IN RCRD: CPT | Performed by: INTERNAL MEDICINE

## 2025-08-12 PROCEDURE — 1159F MED LIST DOCD IN RCRD: CPT | Performed by: INTERNAL MEDICINE

## 2025-08-12 PROCEDURE — 94010 BREATHING CAPACITY TEST: CPT | Performed by: INTERNAL MEDICINE

## 2025-08-12 PROCEDURE — 71046 X-RAY EXAM CHEST 2 VIEWS: CPT

## 2025-08-12 PROCEDURE — 99205 OFFICE O/P NEW HI 60 MIN: CPT | Performed by: INTERNAL MEDICINE

## 2025-08-12 PROCEDURE — 3075F SYST BP GE 130 - 139MM HG: CPT | Performed by: INTERNAL MEDICINE

## 2025-08-12 RX ORDER — UMECLIDINIUM 62.5 UG/1
1 AEROSOL, POWDER ORAL DAILY
Qty: 1 EACH | Refills: 11 | Status: SHIPPED | OUTPATIENT
Start: 2025-08-12

## 2025-08-12 RX ORDER — PANTOPRAZOLE SODIUM 40 MG/1
40 TABLET, DELAYED RELEASE ORAL
Qty: 120 TABLET | Refills: 0 | Status: SHIPPED | OUTPATIENT
Start: 2025-08-12

## 2025-08-12 ASSESSMENT — PULMONARY FUNCTION TESTS
FVC_PREDICTED: 2.18
FEV1_PERCENT_PREDICTED_PRE: 51
FEV1_PREDICTED: 1.67
FVC_PERCENT_PREDICTED_PRE: 60
FEV1/FVC: 63
FVC: 1.36
FEV1: 0.86

## 2025-08-14 ENCOUNTER — TELEPHONE (OUTPATIENT)
Age: 80
End: 2025-08-14

## 2025-08-22 ENCOUNTER — OFFICE VISIT (OUTPATIENT)
Age: 80
End: 2025-08-22
Payer: COMMERCIAL

## 2025-08-22 VITALS
BODY MASS INDEX: 30.22 KG/M2 | DIASTOLIC BLOOD PRESSURE: 84 MMHG | SYSTOLIC BLOOD PRESSURE: 132 MMHG | WEIGHT: 153.9 LBS | HEIGHT: 60 IN | HEART RATE: 98 BPM

## 2025-08-22 DIAGNOSIS — I20.89 ANGINA OF EFFORT: ICD-10-CM

## 2025-08-22 DIAGNOSIS — I71.21 ANEURYSM OF ASCENDING AORTA WITHOUT RUPTURE: ICD-10-CM

## 2025-08-22 DIAGNOSIS — I70.0 AORTIC ATHEROSCLEROSIS: ICD-10-CM

## 2025-08-22 DIAGNOSIS — I50.32 CHRONIC DIASTOLIC CONGESTIVE HEART FAILURE (HCC): ICD-10-CM

## 2025-08-22 DIAGNOSIS — I10 PRIMARY HYPERTENSION: ICD-10-CM

## 2025-08-22 DIAGNOSIS — I48.0 PAROXYSMAL ATRIAL FIBRILLATION (HCC): Primary | ICD-10-CM

## 2025-08-22 LAB
ANION GAP SERPL CALC-SCNC: 14 MMOL/L (ref 7–16)
BASOPHILS # BLD: 0.07 K/UL (ref 0–0.2)
BASOPHILS NFR BLD: 0.7 % (ref 0–2)
BUN SERPL-MCNC: 12 MG/DL (ref 8–23)
CALCIUM SERPL-MCNC: 9.1 MG/DL (ref 8.8–10.2)
CHLORIDE SERPL-SCNC: 100 MMOL/L (ref 98–107)
CO2 SERPL-SCNC: 27 MMOL/L (ref 20–29)
CREAT SERPL-MCNC: 0.91 MG/DL (ref 0.6–1.1)
DIFFERENTIAL METHOD BLD: ABNORMAL
EOSINOPHIL # BLD: 0.79 K/UL (ref 0–0.8)
EOSINOPHIL NFR BLD: 7.9 % (ref 0.5–7.8)
ERYTHROCYTE [DISTWIDTH] IN BLOOD BY AUTOMATED COUNT: 13.9 % (ref 11.9–14.6)
GLUCOSE SERPL-MCNC: 93 MG/DL (ref 70–99)
HCT VFR BLD AUTO: 38.5 % (ref 35.8–46.3)
HGB BLD-MCNC: 12.3 G/DL (ref 11.7–15.4)
IMM GRANULOCYTES # BLD AUTO: 0.04 K/UL (ref 0–0.5)
IMM GRANULOCYTES NFR BLD AUTO: 0.4 % (ref 0–5)
LYMPHOCYTES # BLD: 2.06 K/UL (ref 0.5–4.6)
LYMPHOCYTES NFR BLD: 20.7 % (ref 13–44)
MCH RBC QN AUTO: 30.4 PG (ref 26.1–32.9)
MCHC RBC AUTO-ENTMCNC: 31.9 G/DL (ref 31.4–35)
MCV RBC AUTO: 95.3 FL (ref 82–102)
MONOCYTES # BLD: 0.69 K/UL (ref 0.1–1.3)
MONOCYTES NFR BLD: 6.9 % (ref 4–12)
NEUTS SEG # BLD: 6.32 K/UL (ref 1.7–8.2)
NEUTS SEG NFR BLD: 63.4 % (ref 43–78)
NRBC # BLD: 0 K/UL (ref 0–0.2)
NT PRO BNP: 400 PG/ML (ref 0–450)
PLATELET # BLD AUTO: 240 K/UL (ref 150–450)
PMV BLD AUTO: 11.8 FL (ref 9.4–12.3)
POTASSIUM SERPL-SCNC: 4 MMOL/L (ref 3.5–5.1)
RBC # BLD AUTO: 4.04 M/UL (ref 4.05–5.2)
SODIUM SERPL-SCNC: 141 MMOL/L (ref 136–145)
WBC # BLD AUTO: 10 K/UL (ref 4.3–11.1)

## 2025-08-22 PROCEDURE — 99215 OFFICE O/P EST HI 40 MIN: CPT | Performed by: INTERNAL MEDICINE

## 2025-08-22 PROCEDURE — 1126F AMNT PAIN NOTED NONE PRSNT: CPT | Performed by: INTERNAL MEDICINE

## 2025-08-22 PROCEDURE — 1123F ACP DISCUSS/DSCN MKR DOCD: CPT | Performed by: INTERNAL MEDICINE

## 2025-08-22 PROCEDURE — 93000 ELECTROCARDIOGRAM COMPLETE: CPT | Performed by: INTERNAL MEDICINE

## 2025-08-22 PROCEDURE — 3079F DIAST BP 80-89 MM HG: CPT | Performed by: INTERNAL MEDICINE

## 2025-08-22 PROCEDURE — 3075F SYST BP GE 130 - 139MM HG: CPT | Performed by: INTERNAL MEDICINE

## 2025-08-22 RX ORDER — SODIUM CHLORIDE 0.9 % (FLUSH) 0.9 %
5-40 SYRINGE (ML) INJECTION EVERY 12 HOURS SCHEDULED
OUTPATIENT
Start: 2025-08-22

## 2025-08-22 RX ORDER — SODIUM CHLORIDE 9 MG/ML
INJECTION, SOLUTION INTRAVENOUS PRN
OUTPATIENT
Start: 2025-08-22

## 2025-08-22 RX ORDER — ASPIRIN 325 MG
325 TABLET ORAL ONCE
OUTPATIENT
Start: 2025-08-22 | End: 2025-08-22

## 2025-08-22 RX ORDER — SODIUM CHLORIDE 9 MG/ML
INJECTION, SOLUTION INTRAVENOUS CONTINUOUS
OUTPATIENT
Start: 2025-08-22

## 2025-08-22 RX ORDER — DILTIAZEM HYDROCHLORIDE 240 MG/1
240 CAPSULE, COATED, EXTENDED RELEASE ORAL DAILY
Qty: 90 CAPSULE | Refills: 3 | Status: SHIPPED | OUTPATIENT
Start: 2025-08-22

## 2025-08-22 RX ORDER — LORAZEPAM 0.5 MG/1
0.5 TABLET ORAL
OUTPATIENT
Start: 2025-08-22

## 2025-08-22 RX ORDER — SODIUM CHLORIDE 0.9 % (FLUSH) 0.9 %
5-40 SYRINGE (ML) INJECTION PRN
OUTPATIENT
Start: 2025-08-22

## 2025-08-22 RX ORDER — ESOMEPRAZOLE MAGNESIUM 20 MG/1
40 GRANULE, DELAYED RELEASE ORAL DAILY
COMMUNITY

## 2025-08-25 PROBLEM — I25.119 ATHEROSCLEROSIS OF NATIVE CORONARY ARTERY OF NATIVE HEART WITH ANGINA PECTORIS: Status: ACTIVE | Noted: 2025-08-22

## 2025-08-28 ENCOUNTER — OFFICE VISIT (OUTPATIENT)
Dept: CARDIOTHORACIC SURGERY | Age: 80
End: 2025-08-28

## 2025-08-28 VITALS
HEART RATE: 85 BPM | BODY MASS INDEX: 29.8 KG/M2 | SYSTOLIC BLOOD PRESSURE: 122 MMHG | HEIGHT: 60 IN | WEIGHT: 151.8 LBS | OXYGEN SATURATION: 94 % | DIASTOLIC BLOOD PRESSURE: 80 MMHG

## 2025-08-28 DIAGNOSIS — I71.21 ANEURYSM OF ASCENDING AORTA WITHOUT RUPTURE: Primary | ICD-10-CM

## 2025-09-03 ENCOUNTER — HOSPITAL ENCOUNTER (OUTPATIENT)
Age: 80
Setting detail: OUTPATIENT SURGERY
Discharge: HOME | End: 2025-09-03
Attending: INTERNAL MEDICINE | Admitting: INTERNAL MEDICINE
Payer: COMMERCIAL

## 2025-09-03 VITALS
OXYGEN SATURATION: 92 % | TEMPERATURE: 97.8 F | RESPIRATION RATE: 18 BRPM | DIASTOLIC BLOOD PRESSURE: 70 MMHG | HEART RATE: 62 BPM | SYSTOLIC BLOOD PRESSURE: 91 MMHG

## 2025-09-03 DIAGNOSIS — I25.119 ATHEROSCLEROSIS OF NATIVE CORONARY ARTERY OF NATIVE HEART WITH ANGINA PECTORIS: ICD-10-CM

## 2025-09-03 PROCEDURE — C1769 GUIDE WIRE: HCPCS | Performed by: INTERNAL MEDICINE

## 2025-09-03 PROCEDURE — C1894 INTRO/SHEATH, NON-LASER: HCPCS | Performed by: INTERNAL MEDICINE

## 2025-09-03 PROCEDURE — 6360000004 HC RX CONTRAST MEDICATION: Performed by: INTERNAL MEDICINE

## 2025-09-03 PROCEDURE — 6360000002 HC RX W HCPCS: Performed by: INTERNAL MEDICINE

## 2025-09-03 PROCEDURE — 2500000003 HC RX 250 WO HCPCS: Performed by: INTERNAL MEDICINE

## 2025-09-03 PROCEDURE — 93458 L HRT ARTERY/VENTRICLE ANGIO: CPT | Performed by: INTERNAL MEDICINE

## 2025-09-03 PROCEDURE — 2709999900 HC NON-CHARGEABLE SUPPLY: Performed by: INTERNAL MEDICINE

## 2025-09-03 RX ORDER — HEPARIN SODIUM 200 [USP'U]/100ML
INJECTION, SOLUTION INTRAVENOUS CONTINUOUS PRN
Status: DISCONTINUED | OUTPATIENT
Start: 2025-09-03 | End: 2025-09-03 | Stop reason: HOSPADM

## 2025-09-03 RX ORDER — MIDAZOLAM HYDROCHLORIDE 1 MG/ML
INJECTION, SOLUTION INTRAMUSCULAR; INTRAVENOUS PRN
Status: DISCONTINUED | OUTPATIENT
Start: 2025-09-03 | End: 2025-09-03 | Stop reason: HOSPADM

## 2025-09-03 RX ORDER — LIDOCAINE HYDROCHLORIDE 10 MG/ML
INJECTION, SOLUTION INFILTRATION; PERINEURAL PRN
Status: DISCONTINUED | OUTPATIENT
Start: 2025-09-03 | End: 2025-09-03 | Stop reason: HOSPADM

## 2025-09-03 RX ORDER — IOPAMIDOL 755 MG/ML
INJECTION, SOLUTION INTRAVASCULAR PRN
Status: DISCONTINUED | OUTPATIENT
Start: 2025-09-03 | End: 2025-09-03 | Stop reason: HOSPADM

## 2025-09-03 ASSESSMENT — PAIN SCALES - GENERAL
PAINLEVEL_OUTOF10: 0
PAINLEVEL_OUTOF10: 0

## (undated) DEVICE — STRYKER PERFORMANCE SERIES SAGITTAL BLADE: Brand: STRYKER PERFORMANCE SERIES

## (undated) DEVICE — TRAY PREP DRY W/ PREM GLV 2 APPL 6 SPNG 2 UNDPD 1 OVERWRAP

## (undated) DEVICE — (D)PREP SKN CHLRAPRP APPL 26ML -- CONVERT TO ITEM 371833

## (undated) DEVICE — SLIM BODY SKIN STAPLER: Brand: APPOSE ULC

## (undated) DEVICE — MEDI-VAC YANKAUER SUCTION HANDLE W/BULBOUS TIP: Brand: CARDINAL HEALTH

## (undated) DEVICE — ENDOSCOPIC KIT 1.1+ OP4 CA DE 2 GWN AAMI LEVEL 3

## (undated) DEVICE — DRAPE,U/SHT,SPLIT,FILM,60X84,STERILE: Brand: MEDLINE

## (undated) DEVICE — 3M™ STERI-DRAPE™ INCISE DRAPE, XL 1051: Brand: STERI-DRAPE™

## (undated) DEVICE — SUTURE MCRYL SZ 2-0 L27IN ABSRB UD CP-1 1 L36MM 1/2 CIR REV Y266H

## (undated) DEVICE — AIRLIFE™ OXYGEN TUBING 7 FEET (2.1 M) CRUSH RESISTANT OXYGEN TUBING, VINYL TIPPED: Brand: AIRLIFE™

## (undated) DEVICE — SUTURE PDS II SZ 1 L36IN ABSRB VLT L48MM CTX 1/2 CIR Z371T

## (undated) DEVICE — SUTURE ETHBND EXCEL SZ 5 L30IN NONABSORBABLE GRN L40MM V-37 MB66G

## (undated) DEVICE — STOCKINETTE TUBE 6X48 -- MEDICHOICE

## (undated) DEVICE — CATHETER ANGIO 5FR L110CM COR NYL POLYUR S STL RAD TIGER 4

## (undated) DEVICE — DRAPE,HIP,W/POUCHES,STERILE: Brand: MEDLINE

## (undated) DEVICE — KENDALL RADIOLUCENT FOAM MONITORING ELECTRODE RECTANGULAR SHAPE: Brand: KENDALL

## (undated) DEVICE — 3000CC GUARDIAN II: Brand: GUARDIAN

## (undated) DEVICE — GOWN,AURORA,FABRIC-REINFORCED,2XL: Brand: MEDLINE

## (undated) DEVICE — MOUTHPIECE ENDOSCP L CTRL OPN AND SIDE PORTS DISP

## (undated) DEVICE — CONTAINER FORMALIN PREFILLED 10% NBF 60ML

## (undated) DEVICE — BLOCK BITE AD 60FR W/ VELC STRP ADDRESSES MOST PT AND

## (undated) DEVICE — SYRINGE INFL 60ML DISP ALLIANCE II

## (undated) DEVICE — CONNECTOR TBNG OD5-7MM O2 END DISP

## (undated) DEVICE — SYR LR LCK 1ML GRAD NSAF 30ML --

## (undated) DEVICE — REM POLYHESIVE ADULT PATIENT RETURN ELECTRODE: Brand: VALLEYLAB

## (undated) DEVICE — GUIDEWIRE 035IN 210CM PTFE COAT FIX COR J TIP 15MM FIRM BODY

## (undated) DEVICE — SURGICAL PROCEDURE PACK TOT HIP CDS

## (undated) DEVICE — BUTTON SWITCH PENCIL BLADE ELECTRODE, HOLSTER: Brand: EDGE

## (undated) DEVICE — SINGLE PORT MANIFOLD: Brand: NEPTUNE 2

## (undated) DEVICE — KIT INTRO 6FR L10CM MINI WIRE L45CM DIA0.021IN NDL 21GA ANT

## (undated) DEVICE — SUTURE PDS II SZ 1 L54IN ABSRB VLT L65MM TP-1 1/2 CIR Z879G

## (undated) DEVICE — GAUZE,SPONGE,4"X4",12PLY,WOVEN,NS,LF: Brand: MEDLINE

## (undated) DEVICE — CATHETER DIAG AD 5FR L100CM COR GRY HYDRPHLC NYL MPA 2 W/ 2

## (undated) DEVICE — T4 HOOD

## (undated) DEVICE — HANDPIECE SET WITH COAXIAL HIGH FLOW TIP AND SUCTION TUBE: Brand: INTERPULSE

## (undated) DEVICE — 2000CC GUARDIAN II: Brand: GUARDIAN

## (undated) DEVICE — SYR 50ML LR LCK 1ML GRAD NSAF --

## (undated) DEVICE — SOLUTION IRRIG 3000ML 0.9% SOD CHL FLX CONT 0797208] ICU MEDICAL INC]

## (undated) DEVICE — SOLUTION IV 1000ML 0.9% SOD CHL

## (undated) DEVICE — CATHETER COR DIAG PIGTAILS PIG 145 CRV 5FR 110CM 6 SIDE H

## (undated) DEVICE — BAND COMPR L24CM REG CLR PLAS HEMSTAT EXT HK AND LOOP RETEN

## (undated) DEVICE — FORCEPS BX L240CM JAW DIA2.8MM L CAP W/ NDL MIC MESH TOOTH

## (undated) DEVICE — ESOPHAGEAL BALLOON DILATATION CATHETER: Brand: CRE FIXED WIRE